# Patient Record
Sex: FEMALE | Race: WHITE | NOT HISPANIC OR LATINO | Employment: PART TIME | URBAN - METROPOLITAN AREA
[De-identification: names, ages, dates, MRNs, and addresses within clinical notes are randomized per-mention and may not be internally consistent; named-entity substitution may affect disease eponyms.]

---

## 2017-09-13 ENCOUNTER — TRANSCRIBE ORDERS (OUTPATIENT)
Dept: ADMINISTRATIVE | Facility: HOSPITAL | Age: 47
End: 2017-09-13

## 2017-09-13 DIAGNOSIS — Z12.31 VISIT FOR SCREENING MAMMOGRAM: Primary | ICD-10-CM

## 2017-09-27 ENCOUNTER — APPOINTMENT (OUTPATIENT)
Dept: MAMMOGRAPHY | Facility: HOSPITAL | Age: 47
End: 2017-09-27

## 2017-10-04 ENCOUNTER — HOSPITAL ENCOUNTER (OUTPATIENT)
Dept: MAMMOGRAPHY | Facility: HOSPITAL | Age: 47
Discharge: HOME | End: 2017-10-04
Admitting: OBSTETRICS & GYNECOLOGY

## 2017-10-04 DIAGNOSIS — Z12.31 VISIT FOR SCREENING MAMMOGRAM: ICD-10-CM

## 2017-10-04 PROCEDURE — 77063 BREAST TOMOSYNTHESIS BI: CPT

## 2017-10-04 PROCEDURE — G0202 SCR MAMMO BI INCL CAD: HCPCS

## 2017-10-07 PROCEDURE — 77063 BREAST TOMOSYNTHESIS BI: CPT | Performed by: RADIOLOGY

## 2017-10-07 PROCEDURE — 77067 SCR MAMMO BI INCL CAD: CPT | Performed by: RADIOLOGY

## 2017-10-11 ENCOUNTER — HOSPITAL ENCOUNTER (OUTPATIENT)
Dept: MAMMOGRAPHY | Facility: HOSPITAL | Age: 47
Discharge: HOME | End: 2017-10-11

## 2017-10-11 DIAGNOSIS — Z12.31 VISIT FOR SCREENING MAMMOGRAM: ICD-10-CM

## 2017-10-11 PROCEDURE — G0202 SCR MAMMO BI INCL CAD: HCPCS

## 2018-09-13 ENCOUNTER — TRANSCRIBE ORDERS (OUTPATIENT)
Dept: ADMINISTRATIVE | Facility: HOSPITAL | Age: 48
End: 2018-09-13

## 2018-09-13 DIAGNOSIS — Z12.31 VISIT FOR SCREENING MAMMOGRAM: Primary | ICD-10-CM

## 2018-10-18 ENCOUNTER — HOSPITAL ENCOUNTER (OUTPATIENT)
Dept: MAMMOGRAPHY | Facility: HOSPITAL | Age: 48
Discharge: HOME | End: 2018-10-18
Admitting: OBSTETRICS & GYNECOLOGY

## 2018-10-18 DIAGNOSIS — Z12.31 VISIT FOR SCREENING MAMMOGRAM: ICD-10-CM

## 2018-10-18 PROCEDURE — 77067 SCR MAMMO BI INCL CAD: CPT

## 2018-10-18 PROCEDURE — 77063 BREAST TOMOSYNTHESIS BI: CPT | Performed by: RADIOLOGY

## 2018-10-18 PROCEDURE — 77067 SCR MAMMO BI INCL CAD: CPT | Performed by: RADIOLOGY

## 2018-10-18 PROCEDURE — 77063 BREAST TOMOSYNTHESIS BI: CPT

## 2019-10-25 ENCOUNTER — APPOINTMENT (OUTPATIENT)
Dept: PREADMISSION TESTING | Facility: HOSPITAL | Age: 49
End: 2019-10-25

## 2019-10-25 VITALS — BODY MASS INDEX: 26.56 KG/M2 | WEIGHT: 149.91 LBS | HEIGHT: 63 IN

## 2019-10-25 LAB
ANION GAP SERPL CALCULATED.3IONS-SCNC: 9 MMOL/L (ref 5–15)
BUN BLD-MCNC: 10 MG/DL (ref 6–20)
BUN/CREAT SERPL: 14.3 (ref 7–25)
CALCIUM SPEC-SCNC: 9.2 MG/DL (ref 8.6–10.5)
CHLORIDE SERPL-SCNC: 106 MMOL/L (ref 98–107)
CO2 SERPL-SCNC: 27 MMOL/L (ref 22–29)
CREAT BLD-MCNC: 0.7 MG/DL (ref 0.57–1)
DEPRECATED RDW RBC AUTO: 40.9 FL (ref 37–54)
ERYTHROCYTE [DISTWIDTH] IN BLOOD BY AUTOMATED COUNT: 12 % (ref 12.3–15.4)
GFR SERPL CREATININE-BSD FRML MDRD: 89 ML/MIN/1.73
GLUCOSE BLD-MCNC: 72 MG/DL (ref 65–99)
HCT VFR BLD AUTO: 36.5 % (ref 34–46.6)
HGB BLD-MCNC: 11.9 G/DL (ref 12–15.9)
MCH RBC QN AUTO: 30.1 PG (ref 26.6–33)
MCHC RBC AUTO-ENTMCNC: 32.6 G/DL (ref 31.5–35.7)
MCV RBC AUTO: 92.2 FL (ref 79–97)
PLATELET # BLD AUTO: 309 10*3/MM3 (ref 140–450)
PMV BLD AUTO: 10 FL (ref 6–12)
POTASSIUM BLD-SCNC: 4.4 MMOL/L (ref 3.5–5.2)
RBC # BLD AUTO: 3.96 10*6/MM3 (ref 3.77–5.28)
SODIUM BLD-SCNC: 142 MMOL/L (ref 136–145)
WBC NRBC COR # BLD: 6.73 10*3/MM3 (ref 3.4–10.8)

## 2019-10-25 PROCEDURE — 80048 BASIC METABOLIC PNL TOTAL CA: CPT | Performed by: PLASTIC SURGERY

## 2019-10-25 PROCEDURE — 36415 COLL VENOUS BLD VENIPUNCTURE: CPT

## 2019-10-25 PROCEDURE — 85027 COMPLETE CBC AUTOMATED: CPT | Performed by: PLASTIC SURGERY

## 2019-10-25 RX ORDER — ZOLPIDEM TARTRATE 10 MG/1
10 TABLET ORAL NIGHTLY PRN
COMMUNITY

## 2019-10-25 RX ORDER — LEVOTHYROXINE SODIUM 0.1 MG/1
100 TABLET ORAL DAILY
COMMUNITY
End: 2022-11-23 | Stop reason: DRUGHIGH

## 2019-10-25 NOTE — PAT
The following information and instructions were given:    Do not eat, drink, smoke or chew gum after midnight the night before surgery. This also includes no mints.  Take all routine, prescribed medications including heart and blood pressure medicines with a sip of water unless otherwise instructed by your physician.   Do NOT take diabetic medication unless instructed by your physician.    If you were instructed to drink 20 ounces (or until full) of Gatorade the morning of surgery, the Gatorade must be completed 1 hour before arrival time on the day of surgery .  No RED Gatorade.      DO NOT shave for two days before your procedure.  Do not wear makeup.      DO NOT wear fingernail polish (gel/regular) and/or acrylic/artificial nails on the day of surgery.   If you have had a recent manicure and would rather not remove polish or artificial nails, then the minimum requirement is that the polish/artificial nails must be removed from the middle finger on each hand.      If you are having surgery/procedure on an upper extremity, then fingernail polish/artificial fingernails must be removed for surgery.  NO EXCEPTIONS.      If you are having surgery/procedure on a lower extremity, then toenail polish on both extremities must be removed for surgery.  NO EXCEPTIONS.    Remove all jewelry (advise to go to jeweler if unable to remove).  Jewelry, especially rings, can no longer be taped for surgery.    Leave anything you consider valuable at home.    Leave your suitcase in the car until after your surgery.    Bring the following with you the day of your procedure (when applicable)       -picture ID and insurance cards       -Co-pay/deductible required by insurance       -Medications in the original bottles (not a list) including all over-the-counter medications if not brought to PAT       -Copy of advance directive, living will or power of  documents if not brought to PAT       -CPAP or BIPAP mask and tubing (do not  bring machine)       -Skin prep instruction(s) sheet       -PAT Pass    Education booklet, brochure, handout or binder related to procedure given to patient.    When applicable, an ERAS booklet was given to patient.    Pain Control After Surgery handout given to patient.    Respirex use (handout given to patient) and pneumonia prevention education provided.    Signs and Symptoms of infection discussed.    DVT Prevention education given.  Stressing the importance of ambulation.    Please apply Chlorhexadine wipes to surgical area (if instructed) the night before procedure and the AM of procedure and document date/time of applications on skin prep instruction sheet.        Breast reduction

## 2019-10-25 NOTE — DISCHARGE INSTRUCTIONS
The following information and instructions were given:    Do not eat, drink, smoke or chew gum after midnight the night before surgery. This also includes no mints.  Take all routine, prescribed medications including heart and blood pressure medicines with a sip of water unless otherwise instructed by your physician.   Do NOT take diabetic medication unless instructed by your physician.    If you were instructed to drink 20 ounces (or until full) of Gatorade the morning of surgery, the Gatorade must be completed 1 hour before arrival time on the day of surgery .  No RED Gatorade.      DO NOT shave for two days before your procedure.  Do not wear makeup.      DO NOT wear fingernail polish (gel/regular) and/or acrylic/artificial nails on the day of surgery.   If you have had a recent manicure and would rather not remove polish or artificial nails, then the minimum requirement is that the polish/artificial nails must be removed from the middle finger on each hand.      If you are having surgery/procedure on an upper extremity, then fingernail polish/artificial fingernails must be removed for surgery.  NO EXCEPTIONS.      If you are having surgery/procedure on a lower extremity, then toenail polish on both extremities must be removed for surgery.  NO EXCEPTIONS.    Remove all jewelry (advise to go to jeweler if unable to remove).  Jewelry, especially rings, can no longer be taped for surgery.    Leave anything you consider valuable at home.    Leave your suitcase in the car until after your surgery.    Bring the following with you the day of your procedure (when applicable)       -picture ID and insurance cards       -Co-pay/deductible required by insurance       -Medications in the original bottles (not a list) including all over-the-counter medications if not brought to PAT       -Copy of advance directive, living will or power of  documents if not brought to PAT       -CPAP or BIPAP mask and tubing (do not  bring machine)       -Skin prep instruction(s) sheet       -PAT Pass    Education booklet, brochure, handout or binder related to procedure given to patient.    When applicable, an ERAS booklet was given to patient.    Pain Control After Surgery handout given to patient.    Respirex use (handout given to patient) and pneumonia prevention education provided.    Signs and Symptoms of infection discussed.    DVT Prevention education given.  Stressing the importance of ambulation.    Please apply Chlorhexadine wipes to surgical area (if instructed) the night before procedure and the AM of procedure and document date/time of applications on skin prep instruction sheet.

## 2019-10-31 ENCOUNTER — ANESTHESIA (OUTPATIENT)
Dept: PERIOP | Facility: HOSPITAL | Age: 49
End: 2019-10-31

## 2019-10-31 ENCOUNTER — HOSPITAL ENCOUNTER (OUTPATIENT)
Facility: HOSPITAL | Age: 49
Discharge: HOME | End: 2019-10-31
Attending: PLASTIC SURGERY | Admitting: PLASTIC SURGERY

## 2019-10-31 ENCOUNTER — ANESTHESIA EVENT (OUTPATIENT)
Dept: PERIOP | Facility: HOSPITAL | Age: 49
End: 2019-10-31

## 2019-10-31 VITALS
BODY MASS INDEX: 26.58 KG/M2 | WEIGHT: 150 LBS | TEMPERATURE: 97.8 F | HEIGHT: 63 IN | SYSTOLIC BLOOD PRESSURE: 118 MMHG | RESPIRATION RATE: 18 BRPM | DIASTOLIC BLOOD PRESSURE: 69 MMHG | HEART RATE: 65 BPM | OXYGEN SATURATION: 98 %

## 2019-10-31 DIAGNOSIS — N62 BREAST HYPERTROPHY: ICD-10-CM

## 2019-10-31 DIAGNOSIS — M54.6 PAIN IN THORACIC SPINE: ICD-10-CM

## 2019-10-31 LAB
B-HCG UR QL: NEGATIVE
INTERNAL NEGATIVE CONTROL: NEGATIVE
INTERNAL POSITIVE CONTROL: POSITIVE
Lab: NORMAL

## 2019-10-31 PROCEDURE — 25010000002 DEXAMETHASONE PER 1 MG: Performed by: NURSE ANESTHETIST, CERTIFIED REGISTERED

## 2019-10-31 PROCEDURE — 25010000002 DEXAMETHASONE SODIUM PHOSPHATE 10 MG/ML SOLUTION: Performed by: ANESTHESIOLOGY

## 2019-10-31 PROCEDURE — 25010000002 BUPRENORPHINE PER 0.1 MG: Performed by: ANESTHESIOLOGY

## 2019-10-31 PROCEDURE — 25010000002 FENTANYL CITRATE (PF) 100 MCG/2ML SOLUTION: Performed by: NURSE ANESTHETIST, CERTIFIED REGISTERED

## 2019-10-31 PROCEDURE — 81025 URINE PREGNANCY TEST: CPT | Performed by: PLASTIC SURGERY

## 2019-10-31 PROCEDURE — 25010000002 PROPOFOL 10 MG/ML EMULSION: Performed by: NURSE ANESTHETIST, CERTIFIED REGISTERED

## 2019-10-31 PROCEDURE — 25010000002 ONDANSETRON PER 1 MG: Performed by: NURSE ANESTHETIST, CERTIFIED REGISTERED

## 2019-10-31 PROCEDURE — 25010000003 CEFAZOLIN IN DEXTROSE 2-4 GM/100ML-% SOLUTION: Performed by: PLASTIC SURGERY

## 2019-10-31 PROCEDURE — 25010000002 NEOSTIGMINE 10 MG/10ML SOLUTION: Performed by: NURSE ANESTHETIST, CERTIFIED REGISTERED

## 2019-10-31 PROCEDURE — 25010000002 PROMETHAZINE PER 50 MG: Performed by: NURSE ANESTHETIST, CERTIFIED REGISTERED

## 2019-10-31 PROCEDURE — 88305 TISSUE EXAM BY PATHOLOGIST: CPT | Performed by: PLASTIC SURGERY

## 2019-10-31 RX ORDER — PROMETHAZINE HYDROCHLORIDE 25 MG/1
25 SUPPOSITORY RECTAL ONCE AS NEEDED
Status: DISCONTINUED | OUTPATIENT
Start: 2019-10-31 | End: 2019-10-31 | Stop reason: HOSPADM

## 2019-10-31 RX ORDER — SODIUM CHLORIDE, SODIUM LACTATE, POTASSIUM CHLORIDE, CALCIUM CHLORIDE 600; 310; 30; 20 MG/100ML; MG/100ML; MG/100ML; MG/100ML
9 INJECTION, SOLUTION INTRAVENOUS CONTINUOUS
Status: DISCONTINUED | OUTPATIENT
Start: 2019-10-31 | End: 2019-10-31

## 2019-10-31 RX ORDER — PROMETHAZINE HYDROCHLORIDE 25 MG/ML
6.25 INJECTION, SOLUTION INTRAMUSCULAR; INTRAVENOUS ONCE AS NEEDED
Status: DISCONTINUED | OUTPATIENT
Start: 2019-10-31 | End: 2019-10-31 | Stop reason: HOSPADM

## 2019-10-31 RX ORDER — SODIUM CHLORIDE, SODIUM LACTATE, POTASSIUM CHLORIDE, CALCIUM CHLORIDE 600; 310; 30; 20 MG/100ML; MG/100ML; MG/100ML; MG/100ML
INJECTION, SOLUTION INTRAVENOUS CONTINUOUS PRN
Status: DISCONTINUED | OUTPATIENT
Start: 2019-10-31 | End: 2019-10-31 | Stop reason: SURG

## 2019-10-31 RX ORDER — PROPOFOL 10 MG/ML
VIAL (ML) INTRAVENOUS AS NEEDED
Status: DISCONTINUED | OUTPATIENT
Start: 2019-10-31 | End: 2019-10-31 | Stop reason: SURG

## 2019-10-31 RX ORDER — DEXAMETHASONE SODIUM PHOSPHATE 4 MG/ML
INJECTION, SOLUTION INTRA-ARTICULAR; INTRALESIONAL; INTRAMUSCULAR; INTRAVENOUS; SOFT TISSUE AS NEEDED
Status: DISCONTINUED | OUTPATIENT
Start: 2019-10-31 | End: 2019-10-31 | Stop reason: SURG

## 2019-10-31 RX ORDER — HYDROCODONE BITARTRATE AND ACETAMINOPHEN 5; 325 MG/1; MG/1
1 TABLET ORAL ONCE AS NEEDED
Status: DISCONTINUED | OUTPATIENT
Start: 2019-10-31 | End: 2019-10-31 | Stop reason: HOSPADM

## 2019-10-31 RX ORDER — BUPRENORPHINE HYDROCHLORIDE 0.32 MG/ML
INJECTION INTRAMUSCULAR; INTRAVENOUS
Status: COMPLETED | OUTPATIENT
Start: 2019-10-31 | End: 2019-10-31

## 2019-10-31 RX ORDER — IPRATROPIUM BROMIDE AND ALBUTEROL SULFATE 2.5; .5 MG/3ML; MG/3ML
3 SOLUTION RESPIRATORY (INHALATION) ONCE AS NEEDED
Status: DISCONTINUED | OUTPATIENT
Start: 2019-10-31 | End: 2019-10-31 | Stop reason: HOSPADM

## 2019-10-31 RX ORDER — PROMETHAZINE HYDROCHLORIDE 25 MG/1
25 TABLET ORAL ONCE AS NEEDED
Status: DISCONTINUED | OUTPATIENT
Start: 2019-10-31 | End: 2019-10-31 | Stop reason: HOSPADM

## 2019-10-31 RX ORDER — NEOSTIGMINE METHYLSULFATE 1 MG/ML
INJECTION, SOLUTION INTRAVENOUS AS NEEDED
Status: DISCONTINUED | OUTPATIENT
Start: 2019-10-31 | End: 2019-10-31 | Stop reason: SURG

## 2019-10-31 RX ORDER — SODIUM CHLORIDE 0.9 % (FLUSH) 0.9 %
3-10 SYRINGE (ML) INJECTION AS NEEDED
Status: DISCONTINUED | OUTPATIENT
Start: 2019-10-31 | End: 2019-10-31

## 2019-10-31 RX ORDER — BUPIVACAINE HYDROCHLORIDE 2.5 MG/ML
INJECTION, SOLUTION EPIDURAL; INFILTRATION; INTRACAUDAL
Status: COMPLETED | OUTPATIENT
Start: 2019-10-31 | End: 2019-10-31

## 2019-10-31 RX ORDER — GLYCOPYRROLATE 0.2 MG/ML
INJECTION INTRAMUSCULAR; INTRAVENOUS AS NEEDED
Status: DISCONTINUED | OUTPATIENT
Start: 2019-10-31 | End: 2019-10-31 | Stop reason: SURG

## 2019-10-31 RX ORDER — SODIUM CHLORIDE 0.9 % (FLUSH) 0.9 %
3-10 SYRINGE (ML) INJECTION AS NEEDED
Status: DISCONTINUED | OUTPATIENT
Start: 2019-10-31 | End: 2019-10-31 | Stop reason: HOSPADM

## 2019-10-31 RX ORDER — SODIUM CHLORIDE 0.9 % (FLUSH) 0.9 %
3 SYRINGE (ML) INJECTION EVERY 12 HOURS SCHEDULED
Status: DISCONTINUED | OUTPATIENT
Start: 2019-10-31 | End: 2019-10-31

## 2019-10-31 RX ORDER — LIDOCAINE HYDROCHLORIDE 10 MG/ML
0.5 INJECTION, SOLUTION EPIDURAL; INFILTRATION; INTRACAUDAL; PERINEURAL ONCE AS NEEDED
Status: DISCONTINUED | OUTPATIENT
Start: 2019-10-31 | End: 2019-10-31

## 2019-10-31 RX ORDER — HYDROMORPHONE HYDROCHLORIDE 1 MG/ML
0.5 INJECTION, SOLUTION INTRAMUSCULAR; INTRAVENOUS; SUBCUTANEOUS
Status: DISCONTINUED | OUTPATIENT
Start: 2019-10-31 | End: 2019-10-31 | Stop reason: HOSPADM

## 2019-10-31 RX ORDER — ONDANSETRON 2 MG/ML
4 INJECTION INTRAMUSCULAR; INTRAVENOUS ONCE AS NEEDED
Status: DISCONTINUED | OUTPATIENT
Start: 2019-10-31 | End: 2019-10-31 | Stop reason: HOSPADM

## 2019-10-31 RX ORDER — SODIUM CHLORIDE 0.9 % (FLUSH) 0.9 %
3 SYRINGE (ML) INJECTION EVERY 12 HOURS SCHEDULED
Status: DISCONTINUED | OUTPATIENT
Start: 2019-10-31 | End: 2019-10-31 | Stop reason: HOSPADM

## 2019-10-31 RX ORDER — SODIUM CHLORIDE, SODIUM LACTATE, POTASSIUM CHLORIDE, CALCIUM CHLORIDE 600; 310; 30; 20 MG/100ML; MG/100ML; MG/100ML; MG/100ML
9 INJECTION, SOLUTION INTRAVENOUS CONTINUOUS PRN
Status: DISCONTINUED | OUTPATIENT
Start: 2019-10-31 | End: 2019-10-31 | Stop reason: HOSPADM

## 2019-10-31 RX ORDER — EPHEDRINE SULFATE 50 MG/ML
INJECTION, SOLUTION INTRAVENOUS AS NEEDED
Status: DISCONTINUED | OUTPATIENT
Start: 2019-10-31 | End: 2019-10-31 | Stop reason: SURG

## 2019-10-31 RX ORDER — ROCURONIUM BROMIDE 10 MG/ML
INJECTION, SOLUTION INTRAVENOUS AS NEEDED
Status: DISCONTINUED | OUTPATIENT
Start: 2019-10-31 | End: 2019-10-31 | Stop reason: SURG

## 2019-10-31 RX ORDER — MAGNESIUM HYDROXIDE 1200 MG/15ML
LIQUID ORAL AS NEEDED
Status: DISCONTINUED | OUTPATIENT
Start: 2019-10-31 | End: 2019-10-31 | Stop reason: HOSPADM

## 2019-10-31 RX ORDER — FAMOTIDINE 20 MG/1
20 TABLET, FILM COATED ORAL ONCE
Status: COMPLETED | OUTPATIENT
Start: 2019-10-31 | End: 2019-10-31

## 2019-10-31 RX ORDER — DEXAMETHASONE SODIUM PHOSPHATE 10 MG/ML
INJECTION, SOLUTION INTRAMUSCULAR; INTRAVENOUS
Status: COMPLETED | OUTPATIENT
Start: 2019-10-31 | End: 2019-10-31

## 2019-10-31 RX ORDER — FAMOTIDINE 10 MG/ML
20 INJECTION, SOLUTION INTRAVENOUS ONCE
Status: DISCONTINUED | OUTPATIENT
Start: 2019-10-31 | End: 2019-10-31

## 2019-10-31 RX ORDER — LABETALOL HYDROCHLORIDE 5 MG/ML
5 INJECTION, SOLUTION INTRAVENOUS
Status: DISCONTINUED | OUTPATIENT
Start: 2019-10-31 | End: 2019-10-31 | Stop reason: HOSPADM

## 2019-10-31 RX ORDER — FENTANYL CITRATE 50 UG/ML
INJECTION, SOLUTION INTRAMUSCULAR; INTRAVENOUS AS NEEDED
Status: DISCONTINUED | OUTPATIENT
Start: 2019-10-31 | End: 2019-10-31 | Stop reason: SURG

## 2019-10-31 RX ORDER — MEPERIDINE HYDROCHLORIDE 25 MG/ML
12.5 INJECTION INTRAMUSCULAR; INTRAVENOUS; SUBCUTANEOUS
Status: DISCONTINUED | OUTPATIENT
Start: 2019-10-31 | End: 2019-10-31 | Stop reason: HOSPADM

## 2019-10-31 RX ORDER — PROMETHAZINE HYDROCHLORIDE 25 MG/ML
INJECTION, SOLUTION INTRAMUSCULAR; INTRAVENOUS AS NEEDED
Status: DISCONTINUED | OUTPATIENT
Start: 2019-10-31 | End: 2019-10-31 | Stop reason: SURG

## 2019-10-31 RX ORDER — FAMOTIDINE 20 MG/1
20 TABLET, FILM COATED ORAL
Status: DISCONTINUED | OUTPATIENT
Start: 2019-10-31 | End: 2019-10-31 | Stop reason: SDUPTHER

## 2019-10-31 RX ORDER — FENTANYL CITRATE 50 UG/ML
50 INJECTION, SOLUTION INTRAMUSCULAR; INTRAVENOUS
Status: DISCONTINUED | OUTPATIENT
Start: 2019-10-31 | End: 2019-10-31 | Stop reason: HOSPADM

## 2019-10-31 RX ORDER — HYDRALAZINE HYDROCHLORIDE 20 MG/ML
5 INJECTION INTRAMUSCULAR; INTRAVENOUS
Status: DISCONTINUED | OUTPATIENT
Start: 2019-10-31 | End: 2019-10-31 | Stop reason: HOSPADM

## 2019-10-31 RX ORDER — LIDOCAINE HYDROCHLORIDE 10 MG/ML
0.5 INJECTION, SOLUTION EPIDURAL; INFILTRATION; INTRACAUDAL; PERINEURAL ONCE AS NEEDED
Status: COMPLETED | OUTPATIENT
Start: 2019-10-31 | End: 2019-10-31

## 2019-10-31 RX ORDER — ONDANSETRON 2 MG/ML
INJECTION INTRAMUSCULAR; INTRAVENOUS AS NEEDED
Status: DISCONTINUED | OUTPATIENT
Start: 2019-10-31 | End: 2019-10-31 | Stop reason: SURG

## 2019-10-31 RX ORDER — CEFAZOLIN SODIUM 2 G/100ML
2 INJECTION, SOLUTION INTRAVENOUS ONCE
Status: COMPLETED | OUTPATIENT
Start: 2019-10-31 | End: 2019-10-31

## 2019-10-31 RX ORDER — NALOXONE HCL 0.4 MG/ML
0.4 VIAL (ML) INJECTION AS NEEDED
Status: DISCONTINUED | OUTPATIENT
Start: 2019-10-31 | End: 2019-10-31 | Stop reason: HOSPADM

## 2019-10-31 RX ADMIN — EPHEDRINE SULFATE 10 MG: 50 INJECTION INTRAVENOUS at 09:01

## 2019-10-31 RX ADMIN — DEXAMETHASONE SODIUM PHOSPHATE 8 MG: 4 INJECTION, SOLUTION INTRAMUSCULAR; INTRAVENOUS at 07:55

## 2019-10-31 RX ADMIN — DEXAMETHASONE SODIUM PHOSPHATE 4 MG: 10 INJECTION INTRAMUSCULAR; INTRAVENOUS at 08:17

## 2019-10-31 RX ADMIN — FAMOTIDINE 20 MG: 20 TABLET ORAL at 06:50

## 2019-10-31 RX ADMIN — PROPOFOL 150 MG: 10 INJECTION, EMULSION INTRAVENOUS at 07:41

## 2019-10-31 RX ADMIN — NEOSTIGMINE METHYLSULFATE 3 MG: 1 INJECTION, SOLUTION INTRAVENOUS at 11:30

## 2019-10-31 RX ADMIN — BUPRENORPHINE HYDROCHLORIDE 0.3 MG: 0.32 INJECTION INTRAMUSCULAR; INTRAVENOUS at 08:17

## 2019-10-31 RX ADMIN — SODIUM CHLORIDE, POTASSIUM CHLORIDE, SODIUM LACTATE AND CALCIUM CHLORIDE: 600; 310; 30; 20 INJECTION, SOLUTION INTRAVENOUS at 08:43

## 2019-10-31 RX ADMIN — SODIUM CHLORIDE, POTASSIUM CHLORIDE, SODIUM LACTATE AND CALCIUM CHLORIDE 9 ML/HR: 600; 310; 30; 20 INJECTION, SOLUTION INTRAVENOUS at 06:50

## 2019-10-31 RX ADMIN — LIDOCAINE HYDROCHLORIDE 0.5 ML: 10 INJECTION, SOLUTION EPIDURAL; INFILTRATION; INTRACAUDAL; PERINEURAL at 06:50

## 2019-10-31 RX ADMIN — CEFAZOLIN SODIUM 2 G: 2 INJECTION, SOLUTION INTRAVENOUS at 07:45

## 2019-10-31 RX ADMIN — GLYCOPYRROLATE 0.4 MG: 0.2 INJECTION, SOLUTION INTRAMUSCULAR; INTRAVENOUS at 11:30

## 2019-10-31 RX ADMIN — PROMETHAZINE HYDROCHLORIDE 6.25 MG: 25 INJECTION INTRAMUSCULAR; INTRAVENOUS at 07:55

## 2019-10-31 RX ADMIN — EPHEDRINE SULFATE 10 MG: 50 INJECTION INTRAVENOUS at 09:22

## 2019-10-31 RX ADMIN — FENTANYL CITRATE 50 MCG: 50 INJECTION, SOLUTION INTRAMUSCULAR; INTRAVENOUS at 07:41

## 2019-10-31 RX ADMIN — SODIUM CHLORIDE, POTASSIUM CHLORIDE, SODIUM LACTATE AND CALCIUM CHLORIDE: 600; 310; 30; 20 INJECTION, SOLUTION INTRAVENOUS at 07:30

## 2019-10-31 RX ADMIN — PROPOFOL 25 MCG/KG/MIN: 10 INJECTION, EMULSION INTRAVENOUS at 07:55

## 2019-10-31 RX ADMIN — FENTANYL CITRATE 50 MCG: 50 INJECTION, SOLUTION INTRAMUSCULAR; INTRAVENOUS at 11:30

## 2019-10-31 RX ADMIN — ONDANSETRON 4 MG: 2 INJECTION INTRAMUSCULAR; INTRAVENOUS at 11:18

## 2019-10-31 RX ADMIN — BUPIVACAINE HYDROCHLORIDE 50 ML: 2.5 INJECTION, SOLUTION EPIDURAL; INFILTRATION; INTRACAUDAL; PERINEURAL at 08:17

## 2019-10-31 RX ADMIN — ROCURONIUM BROMIDE 50 MG: 10 INJECTION INTRAVENOUS at 07:42

## 2019-11-02 LAB
CYTO UR: NORMAL
LAB AP CASE REPORT: NORMAL
LAB AP CLINICAL INFORMATION: NORMAL
PATH REPORT.FINAL DX SPEC: NORMAL
PATH REPORT.GROSS SPEC: NORMAL

## 2020-04-27 DIAGNOSIS — H66.92 OTITIS OF LEFT EAR: Primary | ICD-10-CM

## 2020-04-27 RX ORDER — AMOXICILLIN 500 MG/1
1000 CAPSULE ORAL 2 TIMES DAILY
Qty: 14 CAPSULE | Refills: 0 | Status: SHIPPED | OUTPATIENT
Start: 2020-04-27 | End: 2022-11-23

## 2020-06-10 ENCOUNTER — TRANSCRIBE ORDERS (OUTPATIENT)
Dept: ADMINISTRATIVE | Facility: HOSPITAL | Age: 50
End: 2020-06-10

## 2020-06-10 DIAGNOSIS — Z12.31 VISIT FOR SCREENING MAMMOGRAM: Primary | ICD-10-CM

## 2020-08-07 ENCOUNTER — HOSPITAL ENCOUNTER (OUTPATIENT)
Dept: MAMMOGRAPHY | Facility: HOSPITAL | Age: 50
Discharge: HOME | End: 2020-08-07
Admitting: OBSTETRICS & GYNECOLOGY

## 2020-08-07 DIAGNOSIS — Z12.31 VISIT FOR SCREENING MAMMOGRAM: ICD-10-CM

## 2020-08-07 PROCEDURE — 77063 BREAST TOMOSYNTHESIS BI: CPT

## 2020-08-07 PROCEDURE — 77067 SCR MAMMO BI INCL CAD: CPT

## 2020-08-07 PROCEDURE — 77067 SCR MAMMO BI INCL CAD: CPT | Performed by: RADIOLOGY

## 2020-08-07 PROCEDURE — 77063 BREAST TOMOSYNTHESIS BI: CPT | Performed by: RADIOLOGY

## 2020-12-21 ENCOUNTER — IMMUNIZATION (OUTPATIENT)
Dept: VACCINE CLINIC | Facility: HOSPITAL | Age: 50
End: 2020-12-21

## 2020-12-21 PROCEDURE — 91300 HC SARSCOV02 VAC 30MCG/0.3ML IM: CPT

## 2020-12-21 PROCEDURE — 0001A: CPT

## 2021-01-11 ENCOUNTER — IMMUNIZATION (OUTPATIENT)
Dept: VACCINE CLINIC | Facility: HOSPITAL | Age: 51
End: 2021-01-11

## 2021-01-11 PROCEDURE — 91300 HC SARSCOV02 VAC 30MCG/0.3ML IM: CPT | Performed by: INTERNAL MEDICINE

## 2021-01-11 PROCEDURE — 0002A: CPT | Performed by: INTERNAL MEDICINE

## 2021-01-11 PROCEDURE — 0001A: CPT | Performed by: INTERNAL MEDICINE

## 2021-10-06 ENCOUNTER — IMMUNIZATION (OUTPATIENT)
Dept: VACCINE CLINIC | Facility: HOSPITAL | Age: 51
End: 2021-10-06

## 2021-10-06 PROCEDURE — 0003A: CPT | Performed by: INTERNAL MEDICINE

## 2021-10-06 PROCEDURE — 0004A ADM SARSCOV2 30MCG/0.3ML BOOSTER: CPT | Performed by: INTERNAL MEDICINE

## 2021-10-06 PROCEDURE — 91300 HC SARSCOV02 VAC 30MCG/0.3ML IM: CPT | Performed by: INTERNAL MEDICINE

## 2021-10-20 ENCOUNTER — TRANSCRIBE ORDERS (OUTPATIENT)
Dept: ADMINISTRATIVE | Facility: HOSPITAL | Age: 51
End: 2021-10-20

## 2021-10-20 DIAGNOSIS — Z12.31 VISIT FOR SCREENING MAMMOGRAM: Primary | ICD-10-CM

## 2021-11-23 ENCOUNTER — APPOINTMENT (OUTPATIENT)
Dept: MAMMOGRAPHY | Facility: HOSPITAL | Age: 51
End: 2021-11-23

## 2021-12-03 ENCOUNTER — OFFICE (OUTPATIENT)
Dept: URBAN - METROPOLITAN AREA PATHOLOGY 4 | Facility: PATHOLOGY | Age: 51
End: 2021-12-03
Payer: COMMERCIAL

## 2021-12-03 ENCOUNTER — AMBULATORY SURGICAL CENTER (OUTPATIENT)
Dept: URBAN - METROPOLITAN AREA SURGERY 10 | Facility: SURGERY | Age: 51
End: 2021-12-03
Payer: COMMERCIAL

## 2021-12-03 DIAGNOSIS — D12.8 BENIGN NEOPLASM OF RECTUM: ICD-10-CM

## 2021-12-03 DIAGNOSIS — Z12.11 ENCOUNTER FOR SCREENING FOR MALIGNANT NEOPLASM OF COLON: ICD-10-CM

## 2021-12-03 DIAGNOSIS — K64.0 FIRST DEGREE HEMORRHOIDS: ICD-10-CM

## 2021-12-03 PROCEDURE — 88305 TISSUE EXAM BY PATHOLOGIST: CPT | Mod: 33,Q6 | Performed by: INTERNAL MEDICINE

## 2021-12-03 PROCEDURE — 88305 TISSUE EXAM BY PATHOLOGIST: CPT | Mod: Q6,33 | Performed by: INTERNAL MEDICINE

## 2021-12-03 PROCEDURE — 45385 COLONOSCOPY W/LESION REMOVAL: CPT | Mod: 33 | Performed by: INTERNAL MEDICINE

## 2021-12-06 PROBLEM — Z12.11 SCREENING FOR COLONIC NEOPLASIA: Status: ACTIVE | Noted: 2021-12-06

## 2021-12-07 ENCOUNTER — HOSPITAL ENCOUNTER (OUTPATIENT)
Dept: MAMMOGRAPHY | Facility: HOSPITAL | Age: 51
Discharge: HOME | End: 2021-12-07
Admitting: OBSTETRICS & GYNECOLOGY

## 2021-12-07 DIAGNOSIS — Z12.31 VISIT FOR SCREENING MAMMOGRAM: ICD-10-CM

## 2021-12-07 PROCEDURE — 77063 BREAST TOMOSYNTHESIS BI: CPT

## 2021-12-07 PROCEDURE — 77063 BREAST TOMOSYNTHESIS BI: CPT | Performed by: RADIOLOGY

## 2021-12-07 PROCEDURE — 77067 SCR MAMMO BI INCL CAD: CPT

## 2021-12-07 PROCEDURE — 77067 SCR MAMMO BI INCL CAD: CPT | Performed by: RADIOLOGY

## 2022-04-18 DIAGNOSIS — Z00.6 EXAMINATION FOR NORMAL COMPARISON OR CONTROL IN CLINICAL RESEARCH: Primary | ICD-10-CM

## 2022-04-29 DIAGNOSIS — Z01.812 BLOOD TESTS PRIOR TO TREATMENT OR PROCEDURE: Primary | ICD-10-CM

## 2022-05-02 ENCOUNTER — CLINICAL SUPPORT NO REQUIREMENTS (OUTPATIENT)
Dept: PULMONOLOGY | Facility: CLINIC | Age: 52
End: 2022-05-02

## 2022-05-02 DIAGNOSIS — Z01.812 BLOOD TESTS PRIOR TO TREATMENT OR PROCEDURE: ICD-10-CM

## 2022-05-02 PROCEDURE — U0004 COV-19 TEST NON-CDC HGH THRU: HCPCS | Performed by: INTERNAL MEDICINE

## 2022-05-02 PROCEDURE — 99211 OFF/OP EST MAY X REQ PHY/QHP: CPT | Performed by: INTERNAL MEDICINE

## 2022-05-03 LAB — SARS-COV-2 RNA NOSE QL NAA+PROBE: NOT DETECTED

## 2022-05-04 ENCOUNTER — OFFICE VISIT (OUTPATIENT)
Dept: PULMONOLOGY | Facility: CLINIC | Age: 52
End: 2022-05-04

## 2022-05-04 VITALS
BODY MASS INDEX: 27.11 KG/M2 | WEIGHT: 153 LBS | DIASTOLIC BLOOD PRESSURE: 70 MMHG | HEART RATE: 68 BPM | HEIGHT: 63 IN | RESPIRATION RATE: 14 BRPM | SYSTOLIC BLOOD PRESSURE: 120 MMHG | OXYGEN SATURATION: 98 % | TEMPERATURE: 98.4 F

## 2022-05-04 DIAGNOSIS — R06.02 SHORTNESS OF BREATH: Primary | ICD-10-CM

## 2022-05-04 DIAGNOSIS — M94.0 COSTOCHONDRITIS, ACUTE: ICD-10-CM

## 2022-05-04 DIAGNOSIS — R05.9 COUGH: ICD-10-CM

## 2022-05-04 DIAGNOSIS — J20.9 ACUTE BRONCHITIS, UNSPECIFIED ORGANISM: ICD-10-CM

## 2022-05-04 PROCEDURE — 99203 OFFICE O/P NEW LOW 30 MIN: CPT | Performed by: INTERNAL MEDICINE

## 2022-05-04 PROCEDURE — 94726 PLETHYSMOGRAPHY LUNG VOLUMES: CPT | Performed by: INTERNAL MEDICINE

## 2022-05-04 PROCEDURE — 94010 BREATHING CAPACITY TEST: CPT | Performed by: INTERNAL MEDICINE

## 2022-05-04 PROCEDURE — 94729 DIFFUSING CAPACITY: CPT | Performed by: INTERNAL MEDICINE

## 2022-05-04 RX ORDER — ALBUTEROL SULFATE 90 UG/1
AEROSOL, METERED RESPIRATORY (INHALATION)
COMMUNITY
Start: 2022-04-06 | End: 2022-11-23

## 2022-05-04 NOTE — PROGRESS NOTES
"Sorts Subjective:     Chief Complaint:   Chief Complaint   Patient presents with   • Cough   • Shortness of Breath       HPI:    Gypsy Mann is a 52 y.o. female seen in consultation at the request of Ulises eTrry MD    She had issues with her back requiring steroid injections in January.  She also had a COVID infection in January.  She has had an increased cough since then.    She developed upper airway type \"cold\" symptoms in March.  She typically has these in January on a yearly basis.  She was not sure whether it was an infection, or allergies.  In any event over several weeks this descendent of her chest and she developed an increasingly bad cough.  This was so severe that she presented to Dr. Terry in early April.  She was prescribed prednisone and azithromycin.  This did not help and she returned on 4/13.  A CT scan of the chest was ordered which was normal.  A referral was made to see me at that time and she is now getting in for the evaluation.  Since the referral her cough has gradually improved.  It is still present but it is there at a low level.  She does have chest pain when she deep breathes or coughs.  She can reproduce this pain with pressure.    Typically sinus problems are not an issue for her and have not been a major issue with this symptom complex.  She was given albuterol which did not seem to help much.    Current medications are:   Current Outpatient Medications:   •  albuterol sulfate  (90 Base) MCG/ACT inhaler, INHALE 1 PUFF BY MOUTH 4 TIMES DAILY, Disp: , Rfl:   •  levothyroxine (SYNTHROID, LEVOTHROID) 100 MCG tablet, Take 100 mcg by mouth Daily., Disp: , Rfl:   •  zolpidem (AMBIEN) 10 MG tablet, Take 10 mg by mouth At Night As Needed for Sleep., Disp: , Rfl:   •  amoxicillin (AMOXIL) 500 MG capsule, Take 2 capsules by mouth 2 (Two) Times a Day., Disp: 14 capsule, Rfl: 0.      The patient's relevant past medical, surgical, family and social history were reviewed and updated " in Epic as appropriate.     ROS:    Review of Systems  ROS documented in patient questionnaire ×14 systems.  Reviewed with patient.  Otherwise negative except as noted in HPI.    Objective:    Physical Exam  Vitals and nursing note reviewed.   Constitutional:       Appearance: Normal appearance. She is well-developed.   HENT:      Head: Normocephalic and atraumatic.      Nose: Nose normal.      Mouth/Throat:      Mouth: Mucous membranes are moist.      Pharynx: Oropharynx is clear. No oropharyngeal exudate.   Eyes:      General: No scleral icterus.     Conjunctiva/sclera: Conjunctivae normal.   Neck:      Thyroid: No thyromegaly.      Trachea: No tracheal deviation.   Cardiovascular:      Rate and Rhythm: Normal rate and regular rhythm.      Heart sounds: No murmur heard.    No friction rub. No gallop.   Pulmonary:      Effort: Pulmonary effort is normal. No respiratory distress.      Breath sounds: No wheezing or rales.   Musculoskeletal:         General: No deformity. Normal range of motion.   Skin:     General: Skin is warm and dry.      Findings: No rash.   Neurological:      Mental Status: She is alert and oriented to person, place, and time.   Psychiatric:         Behavior: Behavior normal.         Thought Content: Thought content normal.         Data:     PFT: Borderline obstruction based only on the mid flows.  Normal lung volumes.  Normal diffusion.    CXR: None.  Recent negative CAT scan of the chest.  Those images were reviewed and I concur.    Assessment:    Problem List Items Addressed This Visit        Pulmonary Problems    Cough    Acute bronchitis    Relevant Medications    albuterol sulfate  (90 Base) MCG/ACT inhaler       Other    Costochondritis, acute      Other Visit Diagnoses     Shortness of breath    -  Primary    Relevant Orders    Pulmonary Function Test (Completed)          52-year-old female with episodes of yearly bronchitis now with persistent symptoms for 2 months presumably  related to an acute bronchitis.  She did have COVID in January and has been coughing more ever since then as well.  Her CAT scan is normal.  Her PFTs are probably normal except for some decrease in mid flows of unclear clinical significance.  She is improving from a cough standpoint.    I think she likely had an acute viral bronchitis and has had slowly resolving symptoms.  Her recent COVID infection may be playing a role in her airway inflammation.  There is no parenchymal issues based upon her CAT scan.    Plan:     1. I do not think there is anything else that I would recommend at the current time.  I do not think any regular inhaled therapy or preventative inhaled therapy would be appropriate  2. She says she gets bronchitis every January but, unfortunately, I do not think there is any way to prevent this other than the usual measures of vaccination and infection avoidance.  3. In regards to her costochondritis she can use a nonsteroidal on a regular basis for a week or 2 and with this and improvement in her cough I suspect this will resolve with time.  4. I do not think she needs longitudinal follow-up in the pulmonary clinic.  I will be happy to see her as she or Dr. Casey/Mara see fit.  5. Discussed in detail with the patient and showed her her PFTs and CAT scan    Level of Risk Low due to:  one stable chronic illnesss    Signed by  Oniel Parrish MD

## 2022-07-27 ENCOUNTER — TRANSCRIBE ORDERS (OUTPATIENT)
Dept: ADMINISTRATIVE | Facility: HOSPITAL | Age: 52
End: 2022-07-27

## 2022-07-27 DIAGNOSIS — M54.50 LOW BACK PAIN, UNSPECIFIED BACK PAIN LATERALITY, UNSPECIFIED CHRONICITY, UNSPECIFIED WHETHER SCIATICA PRESENT: Primary | ICD-10-CM

## 2022-11-23 ENCOUNTER — OFFICE VISIT (OUTPATIENT)
Dept: NEUROSURGERY | Facility: CLINIC | Age: 52
End: 2022-11-23

## 2022-11-23 VITALS — HEIGHT: 63 IN | WEIGHT: 164.8 LBS | TEMPERATURE: 98 F | BODY MASS INDEX: 29.2 KG/M2

## 2022-11-23 DIAGNOSIS — M51.36 DDD (DEGENERATIVE DISC DISEASE), LUMBAR: ICD-10-CM

## 2022-11-23 DIAGNOSIS — M54.16 LUMBAR RADICULOPATHY: Primary | ICD-10-CM

## 2022-11-23 PROCEDURE — 99204 OFFICE O/P NEW MOD 45 MIN: CPT | Performed by: NEUROLOGICAL SURGERY

## 2022-11-23 RX ORDER — GABAPENTIN 300 MG/1
CAPSULE ORAL
Qty: 90 CAPSULE | Refills: 0 | Status: SHIPPED | OUTPATIENT
Start: 2022-11-23 | End: 2023-01-25

## 2022-11-23 RX ORDER — LEVOTHYROXINE SODIUM 0.07 MG/1
75 TABLET ORAL DAILY
COMMUNITY
Start: 2022-09-07

## 2022-11-23 RX ORDER — MELOXICAM 15 MG/1
15 TABLET ORAL DAILY
COMMUNITY
Start: 2022-11-14 | End: 2023-02-22

## 2022-11-23 NOTE — PROGRESS NOTES
Patient: Gypsy Mann  : 1970    Primary Care Provider: Genaro Casey MD    Requesting Provider: As above        History    Chief Complaint: Low back and right lower extremity pain with sensory alteration.    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  In April of this year she developed some right buttock pain.  She denies any inciting or precipitating events.  In July she went on an RV trip and her pain substantially increased.  The pain is been in her buttock but she has had intermittent numbness extending into the S1 distribution of her right lower extremity.  She has had no left lower extremity symptoms.  She is worse with sitting.  She has seen Dr. Mayorga and has had 2 epidural injections.  The last one was done in September.  She was about 90% better.  Her pain is slowly recurring however.    Review of Systems   Constitutional: Negative for activity change, appetite change, chills, diaphoresis, fatigue, fever and unexpected weight change.   HENT: Negative for congestion, dental problem, drooling, ear discharge, ear pain, facial swelling, hearing loss, mouth sores, nosebleeds, postnasal drip, rhinorrhea, sinus pressure, sinus pain, sneezing, sore throat, tinnitus, trouble swallowing and voice change.    Eyes: Negative for photophobia, pain, discharge, redness, itching and visual disturbance.   Respiratory: Negative for apnea, cough, choking, chest tightness, shortness of breath, wheezing and stridor.    Cardiovascular: Negative for chest pain, palpitations and leg swelling.   Gastrointestinal: Negative for abdominal distention, abdominal pain, anal bleeding, blood in stool, constipation, diarrhea, nausea, rectal pain and vomiting.   Endocrine: Negative for cold intolerance, heat intolerance, polydipsia, polyphagia and polyuria.   Genitourinary: Negative for decreased urine volume, difficulty urinating, dysuria, enuresis, flank pain,  "frequency, genital sores, hematuria and urgency.   Musculoskeletal: Positive for back pain. Negative for arthralgias, gait problem, joint swelling, myalgias, neck pain and neck stiffness.   Skin: Negative for color change, pallor, rash and wound.   Allergic/Immunologic: Negative for environmental allergies, food allergies and immunocompromised state.   Neurological: Positive for weakness and numbness. Negative for dizziness, tremors, seizures, syncope, facial asymmetry, speech difficulty, light-headedness and headaches.   Hematological: Negative for adenopathy. Does not bruise/bleed easily.   Psychiatric/Behavioral: Negative for agitation, behavioral problems, confusion, decreased concentration, dysphoric mood, hallucinations, self-injury, sleep disturbance and suicidal ideas. The patient is not nervous/anxious and is not hyperactive.    All other systems reviewed and are negative.      The patient's past medical history, past surgical history, family history, and social history have been reviewed at length in the electronic medical record.      Physical Exam:   Temp 98 °F (36.7 °C) (Infrared)   Ht 160 cm (63\")   Wt 74.8 kg (164 lb 12.8 oz)   BMI 29.19 kg/m²   CONSTITUTIONAL: Patient is well-nourished, pleasant and appears stated age.  MUSCULOSKELETAL:  Straight leg raising is positive on the right at about 30 degrees.  Garth's Sign is negative.  ROM in the low back is normal.  Tenderness in the back to palpation is not observed.  NEUROLOGICAL:  Orientation, memory, attention span, language function, and cognition have been examined and are intact.  Strength is intact in the lower extremities to direct testing.  Muscle tone is normal throughout.  Station and gait are normal.  Sensation is intact to light touch testing throughout.  Deep tendon reflexes are 1+ and symmetrical.  Coordination is intact.      Medical Decision Making    Data Review:   (All imaging studies were personally reviewed unless stated " otherwise)  MRI of the lumbar spine dated 8/4/2022 demonstrates some subtle diffuse degenerative disc disease.  There is a generous right-sided disc extrusion at L5-S1 that effaces and compromises the right S1 nerve root.    Diagnosis:   Right S1 radiculopathy secondary to disc protrusion.    Treatment Options:   I have prescribed gabapentin in escalating doses.  The patient will follow-up with me in several weeks.  If difficulties persist then she may need to consider surgical intervention.  We will continue her anti-inflammatory medication as well.       Diagnosis Plan   1. Lumbar radiculopathy        2. DDD (degenerative disc disease), lumbar            Scribed for Yared Zapien MD by Madeline Marquis NEIL 11/23/2022 17:26 EST      I, Dr. Zapien, personally performed the services described in the documentation, as scribed in my presence, and it is both accurate and complete.

## 2022-12-06 ENCOUNTER — TRANSCRIBE ORDERS (OUTPATIENT)
Dept: ADMINISTRATIVE | Facility: HOSPITAL | Age: 52
End: 2022-12-06

## 2022-12-06 DIAGNOSIS — Z12.31 VISIT FOR SCREENING MAMMOGRAM: Primary | ICD-10-CM

## 2022-12-14 ENCOUNTER — OFFICE VISIT (OUTPATIENT)
Dept: NEUROSURGERY | Facility: CLINIC | Age: 52
End: 2022-12-14

## 2022-12-14 VITALS — TEMPERATURE: 98 F | WEIGHT: 164.2 LBS | BODY MASS INDEX: 29.09 KG/M2 | HEIGHT: 63 IN

## 2022-12-14 DIAGNOSIS — M54.16 LUMBAR RADICULOPATHY: Primary | ICD-10-CM

## 2022-12-14 DIAGNOSIS — M51.36 DDD (DEGENERATIVE DISC DISEASE), LUMBAR: ICD-10-CM

## 2022-12-14 PROCEDURE — 99213 OFFICE O/P EST LOW 20 MIN: CPT | Performed by: NEUROLOGICAL SURGERY

## 2022-12-14 NOTE — PROGRESS NOTES
Patient: Gypsy Mann  : 1970    Primary Care Provider: Genaro Casey MD    Requesting Provider: As above        History    Chief Complaint: Low back and right buttock pain with sensory alteration involving the leg.    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  In April of this year she developed some right buttock pain.  She denies any inciting or precipitating events.  In July she went on an RV trip and her pain substantially increased.  The pain is been in her buttock but she has had intermittent numbness extending into the S1 distribution of her right lower extremity.  She has had no left lower extremity symptoms.  She is worse with sitting.  She has seen Dr. Mayorga and has had 2 epidural injections.  The last one was done in September.  She was about 90% better.  She has tolerated the gabapentin that I prescribed at her last visit.  Unfortunately she still has trouble sitting in certain positions.  The buttock pain is the most significant.  Numbness in her leg intermittently occurs.  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    Review of Systems   Constitutional: Negative for activity change, appetite change, chills, diaphoresis, fatigue, fever and unexpected weight change.   HENT: Negative for congestion, dental problem, drooling, ear discharge, ear pain, facial swelling, hearing loss, mouth sores, nosebleeds, postnasal drip, rhinorrhea, sinus pressure, sinus pain, sneezing, sore throat, tinnitus, trouble swallowing and voice change.    Eyes: Negative for photophobia, pain, discharge, redness, itching and visual disturbance.   Respiratory: Negative for apnea, cough, choking, chest tightness, shortness of breath, wheezing and stridor.    Cardiovascular: Negative for chest pain, palpitations and leg swelling.   Gastrointestinal: Negative for abdominal distention, abdominal pain, anal bleeding,  "blood in stool, constipation, diarrhea, nausea, rectal pain and vomiting.   Endocrine: Negative for cold intolerance, heat intolerance, polydipsia, polyphagia and polyuria.   Genitourinary: Negative for decreased urine volume, difficulty urinating, dysuria, enuresis, flank pain, frequency, genital sores, hematuria and urgency.   Musculoskeletal: Positive for back pain (Right buttock pain). Negative for arthralgias, gait problem, joint swelling, myalgias, neck pain and neck stiffness.   Skin: Negative for color change, pallor, rash and wound.   Allergic/Immunologic: Negative for environmental allergies, food allergies and immunocompromised state.   Neurological: Negative for dizziness, tremors, seizures, syncope, facial asymmetry, speech difficulty, weakness, light-headedness, numbness and headaches.   Hematological: Negative for adenopathy. Does not bruise/bleed easily.   Psychiatric/Behavioral: Negative for agitation, behavioral problems, confusion, decreased concentration, dysphoric mood, hallucinations, self-injury, sleep disturbance and suicidal ideas. The patient is not nervous/anxious and is not hyperactive.    All other systems reviewed and are negative.      The patient's past medical history, past surgical history, family history, and social history have been reviewed at length in the electronic medical record.      Physical Exam:   Temp 98 °F (36.7 °C) (Infrared)   Ht 160 cm (62.99\")   Wt 74.5 kg (164 lb 3.2 oz)   BMI 29.09 kg/m²   Right leg raising remains quite positive on the right at about 30 degrees.    Medical Decision Making    Data Review:   (All imaging studies were personally reviewed unless stated otherwise)  MRI of the lumbar spine dated 8/4/2022 demonstrates some subtle diffuse degenerative disc disease.  There is a generous right-sided disc extrusion at L5-S1 that effaces and compromises the right S1 nerve root.       Diagnosis:   Right S1 radiculopathy secondary to disc " protrusion.    Treatment Options:   The patient is still having more symptoms then either of us would like.  I discussed treatment options.  Ultimately we have elected to increase her gabapentin and essentially double the dose over the next week or so.  She will follow-up in about 3 weeks.  If she continues to struggle then we will pursue right L5-S1 discectomy.  I have explained the nature of that procedure as well as the potential risks, complications, and limitations.       Diagnosis Plan   1. Lumbar radiculopathy        2. DDD (degenerative disc disease), lumbar            Scribed for Yared Zapien MD by RADHA Marlow 12/14/2022 08:30 EST      I, Dr. Zapien, personally performed the services described in the documentation, as scribed in my presence, and it is both accurate and complete.

## 2022-12-15 ENCOUNTER — TELEPHONE (OUTPATIENT)
Dept: NEUROSURGERY | Facility: CLINIC | Age: 52
End: 2022-12-15

## 2022-12-15 NOTE — TELEPHONE ENCOUNTER
Provider:  Curry  Surgery/Procedure:  L5-S1 discectomy  Surgery/Procedure Date:  Not scheduled yet  Last visit:   12/14/22  Next visit: 1/6/23     Reason for call: Patient called, had appointment yesterday with Dr. Zapien and discussed L5-S1 discectomy, at the time of appointment she wanted to hold off on surgery and increase gabapentin, however once she got home she had increased pain after sneezing and now has second thoughts, would like to go ahead and schedule surgery. Would like to have it before the end of the year if at all possible - has met her deductible.    She does work here at [facility] [employer] so if she needs to come in to the office to sign consent that will be no issue. Let us know what the next steps are for her.

## 2022-12-20 ENCOUNTER — TELEPHONE (OUTPATIENT)
Dept: NEUROSURGERY | Facility: CLINIC | Age: 52
End: 2022-12-20

## 2022-12-20 NOTE — TELEPHONE ENCOUNTER
Provider:  Curry  Surgery/Procedure:  MERLE  Surgery/Procedure Date:  NA  Last visit:   12/14/22  Next visit: 1/6/23     Reason for call: Patient called to inform us that she has increased stabbing nerve pain down her right lower back to her buttocks and leg. She was in her car adjusting her self in the seat and felt an excruciating pain, is now unable to even sit up comfortably and is only finding relief lying down. She has MRI of her lumbar spine results from 8/5/22, showing L4-5, L5-S1 right disc protrusion. Does take meloxicam daily.     Spoke with Arminda Cam PA-C and Rolando Collins PA-C, both agree for her to rest tonight, ice the lower back and to take 800mg ibuprofen and to call us for an update tomorrow. Reiterated to patient not to go to the emergency room as they are likely not able to provide the patient with much relief.

## 2022-12-21 ENCOUNTER — PREP FOR SURGERY (OUTPATIENT)
Dept: OTHER | Facility: HOSPITAL | Age: 52
End: 2022-12-21

## 2022-12-21 DIAGNOSIS — M51.16 LUMBAR DISC HERNIATION WITH RADICULOPATHY: Primary | ICD-10-CM

## 2022-12-21 RX ORDER — FAMOTIDINE 20 MG/1
20 TABLET, FILM COATED ORAL
Status: CANCELLED | OUTPATIENT
Start: 2022-12-21

## 2022-12-21 RX ORDER — CEFAZOLIN SODIUM 2 G/100ML
2 INJECTION, SOLUTION INTRAVENOUS ONCE
Status: CANCELLED | OUTPATIENT
Start: 2022-12-21 | End: 2022-12-21

## 2022-12-21 RX ORDER — CHLORHEXIDINE GLUCONATE 4 G/100ML
SOLUTION TOPICAL
Qty: 120 ML | Refills: 0 | Status: SHIPPED | OUTPATIENT
Start: 2022-12-21 | End: 2022-12-30 | Stop reason: HOSPADM

## 2022-12-21 NOTE — H&P
Patient: Gypsy Mann  : 1970     Primary Care Provider: Genaro Casey MD     Requesting Provider: As above           History     Chief Complaint: Low back and right buttock pain with sensory alteration involving the leg.     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  In April of this year she developed some right buttock pain.  She denies any inciting or precipitating events.  In July she went on an RV trip and her pain substantially increased.  The pain is been in her buttock but she has had intermittent numbness extending into the S1 distribution of her right lower extremity.  She has had no left lower extremity symptoms.  She is worse with sitting.  She has seen Dr. Mayorga and has had 2 epidural injections.  The last one was done in September.  She was about 90% better.  She has tolerated the gabapentin that I prescribed at her last visit.  Unfortunately she still has trouble sitting in certain positions.  The buttock pain is the most significant.  Numbness in her leg intermittently occurs.  Some days when she drives the work she has to get out alf and stretch for a while.     Review of Systems   Constitutional: Negative for activity change, appetite change, chills, diaphoresis, fatigue, fever and unexpected weight change.   HENT: Negative for congestion, dental problem, drooling, ear discharge, ear pain, facial swelling, hearing loss, mouth sores, nosebleeds, postnasal drip, rhinorrhea, sinus pressure, sinus pain, sneezing, sore throat, tinnitus, trouble swallowing and voice change.    Eyes: Negative for photophobia, pain, discharge, redness, itching and visual disturbance.   Respiratory: Negative for apnea, cough, choking, chest tightness, shortness of breath, wheezing and stridor.    Cardiovascular: Negative for chest pain, palpitations and leg swelling.   Gastrointestinal: Negative for abdominal distention, abdominal pain, anal  "bleeding, blood in stool, constipation, diarrhea, nausea, rectal pain and vomiting.   Endocrine: Negative for cold intolerance, heat intolerance, polydipsia, polyphagia and polyuria.   Genitourinary: Negative for decreased urine volume, difficulty urinating, dysuria, enuresis, flank pain, frequency, genital sores, hematuria and urgency.   Musculoskeletal: Positive for back pain (Right buttock pain). Negative for arthralgias, gait problem, joint swelling, myalgias, neck pain and neck stiffness.   Skin: Negative for color change, pallor, rash and wound.   Allergic/Immunologic: Negative for environmental allergies, food allergies and immunocompromised state.   Neurological: Negative for dizziness, tremors, seizures, syncope, facial asymmetry, speech difficulty, weakness, light-headedness, numbness and headaches.   Hematological: Negative for adenopathy. Does not bruise/bleed easily.   Psychiatric/Behavioral: Negative for agitation, behavioral problems, confusion, decreased concentration, dysphoric mood, hallucinations, self-injury, sleep disturbance and suicidal ideas. The patient is not nervous/anxious and is not hyperactive.    All other systems reviewed and are negative.        The patient's past medical history, past surgical history, family history, and social history have been reviewed at length in the electronic medical record.        Physical Exam:   Temp 98 °F (36.7 °C) (Infrared)   Ht 160 cm (62.99\")   Wt 74.5 kg (164 lb 3.2 oz)   BMI 29.09 kg/m²   Right leg raising remains quite positive on the right at about 30 degrees.     Medical Decision Making     Data Review:   (All imaging studies were personally reviewed unless stated otherwise)  MRI of the lumbar spine dated 8/4/2022 demonstrates some subtle diffuse degenerative disc disease.  There is a generous right-sided disc extrusion at L5-S1 that effaces and compromises the right S1 nerve root.        Diagnosis:   Right S1 radiculopathy secondary to disc " protrusion.     Treatment Options:   The patient continues to be symptomatic despite extensive conservative treatment.   At her last visit on 12/14/22, she and Dr. Zapien discussed treatment options. Her gabapentin was doubled, and the possibility of surgery was discussed. She called the office this week to report that the medication change was ineffective at controlling her pain and that she would like to proceed with the surgery. Dr Zapien has explained the nature of that procedure as well as the potential risks, complications, and limitations.          Diagnosis Plan   1. Lumbar radiculopathy          2. DDD (degenerative disc disease), lumbar             Mandy L EDIN Chen

## 2022-12-22 ENCOUNTER — TELEPHONE (OUTPATIENT)
Dept: NEUROSURGERY | Facility: CLINIC | Age: 52
End: 2022-12-22

## 2022-12-22 DIAGNOSIS — M54.16 LUMBAR RADICULOPATHY: ICD-10-CM

## 2022-12-22 RX ORDER — GABAPENTIN 600 MG/1
600 TABLET ORAL 3 TIMES DAILY
Qty: 90 TABLET | Refills: 0 | Status: SHIPPED | OUTPATIENT
Start: 2022-12-22 | End: 2022-12-30 | Stop reason: HOSPADM

## 2022-12-22 NOTE — TELEPHONE ENCOUNTER
Caller: Gypsy Mann    Relationship: Self    Best call back number: [phone]    What is the best time to reach you: ANY TIME    Who are you requesting to speak with (clinical staff, provider,  specific staff member): CLINICAL    What was the call regarding: FMLA PAPERWORK    Do you require a callback: YES     PATIENT WOULD LIKE TO KNOW IF THE OFFICE HAS RECEIVED FMLA PAPERWORK FAXED YESTERDAY BY [employer].  PLEASE CALL PATIENT WITH AN UPDATE.  THANK YOU!

## 2022-12-22 NOTE — TELEPHONE ENCOUNTER
Provider:  Curry  Surgery/Procedure:  Lumbar discectomy L5 S1 right  Surgery/Procedure Date:  Upcoming - 12/30/22  Last visit:   12/14/22  Next visit: 1/6/23     Reason for call: Refill request for gabapentin, last office note on 12/14 doubled her dose from 300mg TID to 600mg TID, however no new prescription was sent in, have pended updated prescription for 600mg gabapentin TID.    Surgery on 12/30/22 for L5-S1 disc herniation.    Jack:  10/11/2022 Zolpidem Tartrate 10MG 1970 90 90 Genaro Casey [facility]    [facility] 2  11/25/2022 Gabapentin 300MG 1970 90 33 Yared Zapien [facility]    [facility] 2

## 2022-12-28 ENCOUNTER — PRE-ADMISSION TESTING (OUTPATIENT)
Dept: PREADMISSION TESTING | Facility: HOSPITAL | Age: 52
End: 2022-12-28
Payer: COMMERCIAL

## 2022-12-28 VITALS — HEIGHT: 63 IN | BODY MASS INDEX: 29.75 KG/M2 | WEIGHT: 167.88 LBS

## 2022-12-28 DIAGNOSIS — M51.16 LUMBAR DISC HERNIATION WITH RADICULOPATHY: ICD-10-CM

## 2022-12-28 LAB
ANION GAP SERPL CALCULATED.3IONS-SCNC: 13 MMOL/L (ref 5–15)
BASOPHILS # BLD AUTO: 0.02 10*3/MM3 (ref 0–0.2)
BASOPHILS NFR BLD AUTO: 0.3 % (ref 0–1.5)
BUN SERPL-MCNC: 15 MG/DL (ref 6–20)
BUN/CREAT SERPL: 25.4 (ref 7–25)
CALCIUM SPEC-SCNC: 9.7 MG/DL (ref 8.6–10.5)
CHLORIDE SERPL-SCNC: 103 MMOL/L (ref 98–107)
CO2 SERPL-SCNC: 23 MMOL/L (ref 22–29)
CREAT SERPL-MCNC: 0.59 MG/DL (ref 0.57–1)
DEPRECATED RDW RBC AUTO: 42.3 FL (ref 37–54)
EGFRCR SERPLBLD CKD-EPI 2021: 107.9 ML/MIN/1.73
EOSINOPHIL # BLD AUTO: 0.2 10*3/MM3 (ref 0–0.4)
EOSINOPHIL NFR BLD AUTO: 3.2 % (ref 0.3–6.2)
ERYTHROCYTE [DISTWIDTH] IN BLOOD BY AUTOMATED COUNT: 12.2 % (ref 12.3–15.4)
GLUCOSE SERPL-MCNC: 94 MG/DL (ref 65–99)
HCT VFR BLD AUTO: 37 % (ref 34–46.6)
HGB BLD-MCNC: 11.6 G/DL (ref 12–15.9)
IMM GRANULOCYTES # BLD AUTO: 0.01 10*3/MM3 (ref 0–0.05)
IMM GRANULOCYTES NFR BLD AUTO: 0.2 % (ref 0–0.5)
LYMPHOCYTES # BLD AUTO: 2.32 10*3/MM3 (ref 0.7–3.1)
LYMPHOCYTES NFR BLD AUTO: 36.8 % (ref 19.6–45.3)
MCH RBC QN AUTO: 29.7 PG (ref 26.6–33)
MCHC RBC AUTO-ENTMCNC: 31.4 G/DL (ref 31.5–35.7)
MCV RBC AUTO: 94.9 FL (ref 79–97)
MONOCYTES # BLD AUTO: 0.36 10*3/MM3 (ref 0.1–0.9)
MONOCYTES NFR BLD AUTO: 5.7 % (ref 5–12)
MRSA DNA SPEC QL NAA+PROBE: NEGATIVE
NEUTROPHILS NFR BLD AUTO: 3.4 10*3/MM3 (ref 1.7–7)
NEUTROPHILS NFR BLD AUTO: 53.8 % (ref 42.7–76)
NRBC BLD AUTO-RTO: 0 /100 WBC (ref 0–0.2)
PLATELET # BLD AUTO: 331 10*3/MM3 (ref 140–450)
PMV BLD AUTO: 10 FL (ref 6–12)
POTASSIUM SERPL-SCNC: 4.8 MMOL/L (ref 3.5–5.2)
RBC # BLD AUTO: 3.9 10*6/MM3 (ref 3.77–5.28)
SODIUM SERPL-SCNC: 139 MMOL/L (ref 136–145)
WBC NRBC COR # BLD: 6.31 10*3/MM3 (ref 3.4–10.8)

## 2022-12-28 PROCEDURE — 80048 BASIC METABOLIC PNL TOTAL CA: CPT

## 2022-12-28 PROCEDURE — 36415 COLL VENOUS BLD VENIPUNCTURE: CPT

## 2022-12-28 PROCEDURE — 93010 ELECTROCARDIOGRAM REPORT: CPT | Performed by: INTERNAL MEDICINE

## 2022-12-28 PROCEDURE — 87641 MR-STAPH DNA AMP PROBE: CPT

## 2022-12-28 PROCEDURE — 93005 ELECTROCARDIOGRAM TRACING: CPT

## 2022-12-28 PROCEDURE — 85025 COMPLETE CBC W/AUTO DIFF WBC: CPT

## 2022-12-28 NOTE — PAT
An arrival time for procedure was not provided during PAT visit. If patient had any questions or concerns about their arrival time, they were instructed to contact their surgeon/physician.  Additionally, if the patient referred to an arrival time that was acquired from their my chart account, patient was encouraged to verify that time with their surgeon/physician. Arrival times are NOT provided in Pre Admission Testing Department.    Patient instructed to drink 20 ounces of Gatorade and it needs to be completed 1 hour (for Main OR patients) or 2 hours (scheduled  section & [facility] patients) before given arrival time for procedure (NO RED Gatorade)    Patient verbalized understanding.    Patient denies any current skin issues.     Patient viewed general PAT education video as instructed in their preoperative information received from their surgeon.  Patient stated the general PAT education video was viewed in its entirety and survey completed.  Copies of PAT general education handouts (Incentive Spirometry, Meds to Beds Program, Patient Belongings, Pre-op skin preparation instructions, Blood Glucose testing, Visitor policy, Surgery FAQ, Code H) distributed to patient if not printed. Education related to the PAT pass and skin preparation for surgery (if applicable) completed in PAT as a reinforcement to PAT education video. Patient instructed to return PAT pass provided today as well as completed skin preparation sheet (if applicable) on the day of procedure.     Additionally if patient had not viewed video yet but intended to view it at home or in our waiting area, then referred them to the handout with QR code/link provided during PAT visit.  Instructed patient to complete survey after viewing the video in its entirety.  Encouraged patient/family to read PAT general education handouts thoroughly and notify PAT staff with any questions or concerns. Patient verbalized understanding of all information and  priority content.    PATIENT STATES THAT HER PHARMACY ONLY HAD THE BACTROBAN ORDER FOR HER. PATIENT WAS GIVEN INSTRUCITONS ON HOW TO AND WHEN TO USE. PATIENT TO  BACTROBAN FROM HER PHARMACY. PATIENT VERBALIZED UNDERSTANDING.     Patient to apply Chlorhexadine wipes  to surgical area (as instructed) the night before procedure and the AM of procedure. Wipes provided.    PATIENT EKG ON CHART AND IN Epic FROM 12/28/2022

## 2022-12-29 LAB
QT INTERVAL: 368 MS
QTC INTERVAL: 388 MS

## 2022-12-30 ENCOUNTER — APPOINTMENT (OUTPATIENT)
Dept: GENERAL RADIOLOGY | Facility: HOSPITAL | Age: 52
End: 2022-12-30
Payer: COMMERCIAL

## 2022-12-30 ENCOUNTER — ANESTHESIA EVENT (OUTPATIENT)
Dept: PERIOP | Facility: HOSPITAL | Age: 52
End: 2022-12-30
Payer: COMMERCIAL

## 2022-12-30 ENCOUNTER — ANESTHESIA (OUTPATIENT)
Dept: PERIOP | Facility: HOSPITAL | Age: 52
End: 2022-12-30
Payer: COMMERCIAL

## 2022-12-30 ENCOUNTER — HOSPITAL ENCOUNTER (OUTPATIENT)
Facility: HOSPITAL | Age: 52
Setting detail: HOSPITAL OUTPATIENT SURGERY
Discharge: HOME | End: 2022-12-30
Attending: NEUROLOGICAL SURGERY | Admitting: NEUROLOGICAL SURGERY
Payer: COMMERCIAL

## 2022-12-30 VITALS
BODY MASS INDEX: 29.75 KG/M2 | SYSTOLIC BLOOD PRESSURE: 152 MMHG | HEIGHT: 63 IN | RESPIRATION RATE: 14 BRPM | DIASTOLIC BLOOD PRESSURE: 94 MMHG | HEART RATE: 112 BPM | WEIGHT: 167.88 LBS | OXYGEN SATURATION: 96 % | TEMPERATURE: 98 F

## 2022-12-30 DIAGNOSIS — M51.16 LUMBAR DISC HERNIATION WITH RADICULOPATHY: Primary | ICD-10-CM

## 2022-12-30 LAB
B-HCG UR QL: NEGATIVE
EXPIRATION DATE: NORMAL
INTERNAL NEGATIVE CONTROL: NEGATIVE
INTERNAL POSITIVE CONTROL: POSITIVE
Lab: NORMAL

## 2022-12-30 PROCEDURE — 25010000002 NEOSTIGMINE 10 MG/10ML SOLUTION: Performed by: NURSE ANESTHETIST, CERTIFIED REGISTERED

## 2022-12-30 PROCEDURE — 63030 LAMOT DCMPRN NRV RT 1 LMBR: CPT

## 2022-12-30 PROCEDURE — 25010000002 PROPOFOL 10 MG/ML EMULSION: Performed by: NURSE ANESTHETIST, CERTIFIED REGISTERED

## 2022-12-30 PROCEDURE — 25010000002 HYDROMORPHONE PER 4 MG: Performed by: NURSE ANESTHETIST, CERTIFIED REGISTERED

## 2022-12-30 PROCEDURE — 25010000002 HYDROMORPHONE 1 MG/ML SOLUTION

## 2022-12-30 PROCEDURE — 25010000002 CEFAZOLIN IN DEXTROSE 2-4 GM/100ML-% SOLUTION: Performed by: PHYSICIAN ASSISTANT

## 2022-12-30 PROCEDURE — 25010000002 FENTANYL CITRATE (PF) 100 MCG/2ML SOLUTION: Performed by: NURSE ANESTHETIST, CERTIFIED REGISTERED

## 2022-12-30 PROCEDURE — 81025 URINE PREGNANCY TEST: CPT | Performed by: NEUROLOGICAL SURGERY

## 2022-12-30 PROCEDURE — 25010000002 FENTANYL CITRATE (PF) 50 MCG/ML SOLUTION

## 2022-12-30 PROCEDURE — 25010000002 DEXAMETHASONE PER 1 MG: Performed by: NURSE ANESTHETIST, CERTIFIED REGISTERED

## 2022-12-30 PROCEDURE — 25010000002 ONDANSETRON PER 1 MG: Performed by: NURSE ANESTHETIST, CERTIFIED REGISTERED

## 2022-12-30 PROCEDURE — S0260 H&P FOR SURGERY: HCPCS

## 2022-12-30 PROCEDURE — 76000 FLUOROSCOPY <1 HR PHYS/QHP: CPT

## 2022-12-30 PROCEDURE — 63030 LAMOT DCMPRN NRV RT 1 LMBR: CPT | Performed by: NEUROLOGICAL SURGERY

## 2022-12-30 DEVICE — HEMOST ABS SURGIFOAM SZ100 8X12 10MM: Type: IMPLANTABLE DEVICE | Site: SPINE LUMBAR | Status: FUNCTIONAL

## 2022-12-30 DEVICE — FLOSEAL HEMOSTATIC MATRIX, 10ML
Type: IMPLANTABLE DEVICE | Site: SPINE LUMBAR | Status: FUNCTIONAL
Brand: FLOSEAL HEMOSTATIC MATRIX

## 2022-12-30 RX ORDER — ONDANSETRON 2 MG/ML
INJECTION INTRAMUSCULAR; INTRAVENOUS AS NEEDED
Status: DISCONTINUED | OUTPATIENT
Start: 2022-12-30 | End: 2022-12-30 | Stop reason: SURG

## 2022-12-30 RX ORDER — GLYCOPYRROLATE 0.2 MG/ML
INJECTION INTRAMUSCULAR; INTRAVENOUS AS NEEDED
Status: DISCONTINUED | OUTPATIENT
Start: 2022-12-30 | End: 2022-12-30 | Stop reason: SURG

## 2022-12-30 RX ORDER — MULTIPLE VITAMINS W/ MINERALS TAB 9MG-400MCG
1 TAB ORAL DAILY
COMMUNITY

## 2022-12-30 RX ORDER — LIDOCAINE HYDROCHLORIDE 10 MG/ML
0.5 INJECTION, SOLUTION EPIDURAL; INFILTRATION; INTRACAUDAL; PERINEURAL ONCE AS NEEDED
Status: COMPLETED | OUTPATIENT
Start: 2022-12-30 | End: 2022-12-30

## 2022-12-30 RX ORDER — OXYCODONE HYDROCHLORIDE AND ACETAMINOPHEN 5; 325 MG/1; MG/1
1 TABLET ORAL 3 TIMES DAILY PRN
Qty: 15 TABLET | Refills: 0 | Status: SHIPPED | OUTPATIENT
Start: 2022-12-30 | End: 2023-01-25

## 2022-12-30 RX ORDER — DEXAMETHASONE SODIUM PHOSPHATE 4 MG/ML
INJECTION, SOLUTION INTRA-ARTICULAR; INTRALESIONAL; INTRAMUSCULAR; INTRAVENOUS; SOFT TISSUE AS NEEDED
Status: DISCONTINUED | OUTPATIENT
Start: 2022-12-30 | End: 2022-12-30 | Stop reason: SURG

## 2022-12-30 RX ORDER — OXYCODONE HYDROCHLORIDE AND ACETAMINOPHEN 5; 325 MG/1; MG/1
TABLET ORAL
Status: COMPLETED
Start: 2022-12-30 | End: 2022-12-30

## 2022-12-30 RX ORDER — LIDOCAINE HYDROCHLORIDE 10 MG/ML
INJECTION, SOLUTION EPIDURAL; INFILTRATION; INTRACAUDAL; PERINEURAL AS NEEDED
Status: DISCONTINUED | OUTPATIENT
Start: 2022-12-30 | End: 2022-12-30 | Stop reason: SURG

## 2022-12-30 RX ORDER — MAGNESIUM HYDROXIDE 1200 MG/15ML
LIQUID ORAL AS NEEDED
Status: DISCONTINUED | OUTPATIENT
Start: 2022-12-30 | End: 2022-12-30 | Stop reason: HOSPADM

## 2022-12-30 RX ORDER — MIDAZOLAM HYDROCHLORIDE 1 MG/ML
1 INJECTION INTRAMUSCULAR; INTRAVENOUS
Status: DISCONTINUED | OUTPATIENT
Start: 2022-12-30 | End: 2022-12-30 | Stop reason: HOSPADM

## 2022-12-30 RX ORDER — PROPOFOL 10 MG/ML
VIAL (ML) INTRAVENOUS AS NEEDED
Status: DISCONTINUED | OUTPATIENT
Start: 2022-12-30 | End: 2022-12-30 | Stop reason: SURG

## 2022-12-30 RX ORDER — SODIUM CHLORIDE 9 MG/ML
40 INJECTION, SOLUTION INTRAVENOUS AS NEEDED
Status: DISCONTINUED | OUTPATIENT
Start: 2022-12-30 | End: 2022-12-30 | Stop reason: HOSPADM

## 2022-12-30 RX ORDER — FENTANYL CITRATE 50 UG/ML
INJECTION, SOLUTION INTRAMUSCULAR; INTRAVENOUS AS NEEDED
Status: DISCONTINUED | OUTPATIENT
Start: 2022-12-30 | End: 2022-12-30 | Stop reason: SURG

## 2022-12-30 RX ORDER — SODIUM CHLORIDE 0.9 % (FLUSH) 0.9 %
10 SYRINGE (ML) INJECTION AS NEEDED
Status: DISCONTINUED | OUTPATIENT
Start: 2022-12-30 | End: 2022-12-30 | Stop reason: HOSPADM

## 2022-12-30 RX ORDER — ROCURONIUM BROMIDE 10 MG/ML
INJECTION, SOLUTION INTRAVENOUS AS NEEDED
Status: DISCONTINUED | OUTPATIENT
Start: 2022-12-30 | End: 2022-12-30 | Stop reason: SURG

## 2022-12-30 RX ORDER — CEFAZOLIN SODIUM 2 G/100ML
2 INJECTION, SOLUTION INTRAVENOUS ONCE
Status: COMPLETED | OUTPATIENT
Start: 2022-12-30 | End: 2022-12-30

## 2022-12-30 RX ORDER — BUPIVACAINE HYDROCHLORIDE AND EPINEPHRINE 2.5; 5 MG/ML; UG/ML
INJECTION, SOLUTION EPIDURAL; INFILTRATION; INTRACAUDAL; PERINEURAL AS NEEDED
Status: DISCONTINUED | OUTPATIENT
Start: 2022-12-30 | End: 2022-12-30 | Stop reason: HOSPADM

## 2022-12-30 RX ORDER — NEOSTIGMINE METHYLSULFATE 1 MG/ML
INJECTION, SOLUTION INTRAVENOUS AS NEEDED
Status: DISCONTINUED | OUTPATIENT
Start: 2022-12-30 | End: 2022-12-30 | Stop reason: SURG

## 2022-12-30 RX ORDER — FENTANYL CITRATE 50 UG/ML
50 INJECTION, SOLUTION INTRAMUSCULAR; INTRAVENOUS
Status: DISCONTINUED | OUTPATIENT
Start: 2022-12-30 | End: 2022-12-30 | Stop reason: HOSPADM

## 2022-12-30 RX ORDER — SODIUM CHLORIDE 0.9 % (FLUSH) 0.9 %
10 SYRINGE (ML) INJECTION EVERY 12 HOURS SCHEDULED
Status: DISCONTINUED | OUTPATIENT
Start: 2022-12-30 | End: 2022-12-30 | Stop reason: HOSPADM

## 2022-12-30 RX ORDER — OXYCODONE HYDROCHLORIDE AND ACETAMINOPHEN 5; 325 MG/1; MG/1
1 TABLET ORAL ONCE AS NEEDED
Status: DISCONTINUED | OUTPATIENT
Start: 2022-12-30 | End: 2022-12-30 | Stop reason: HOSPADM

## 2022-12-30 RX ORDER — SODIUM CHLORIDE, SODIUM LACTATE, POTASSIUM CHLORIDE, CALCIUM CHLORIDE 600; 310; 30; 20 MG/100ML; MG/100ML; MG/100ML; MG/100ML
9 INJECTION, SOLUTION INTRAVENOUS CONTINUOUS PRN
Status: DISCONTINUED | OUTPATIENT
Start: 2022-12-30 | End: 2022-12-30 | Stop reason: HOSPADM

## 2022-12-30 RX ORDER — FAMOTIDINE 20 MG/1
20 TABLET, FILM COATED ORAL
Status: COMPLETED | OUTPATIENT
Start: 2022-12-30 | End: 2022-12-30

## 2022-12-30 RX ORDER — FAMOTIDINE 20 MG/1
20 TABLET, FILM COATED ORAL
Status: DISCONTINUED | OUTPATIENT
Start: 2022-12-30 | End: 2022-12-30 | Stop reason: SDUPTHER

## 2022-12-30 RX ORDER — HYDROMORPHONE HYDROCHLORIDE 1 MG/ML
0.5 INJECTION, SOLUTION INTRAMUSCULAR; INTRAVENOUS; SUBCUTANEOUS
Status: DISCONTINUED | OUTPATIENT
Start: 2022-12-30 | End: 2022-12-30 | Stop reason: HOSPADM

## 2022-12-30 RX ORDER — FENTANYL CITRATE 50 UG/ML
INJECTION, SOLUTION INTRAMUSCULAR; INTRAVENOUS
Status: COMPLETED
Start: 2022-12-30 | End: 2022-12-30

## 2022-12-30 RX ADMIN — FENTANYL CITRATE 50 MCG: 50 INJECTION, SOLUTION INTRAMUSCULAR; INTRAVENOUS at 08:32

## 2022-12-30 RX ADMIN — LIDOCAINE HYDROCHLORIDE 0.5 ML: 10 INJECTION, SOLUTION EPIDURAL; INFILTRATION; INTRACAUDAL; PERINEURAL at 06:19

## 2022-12-30 RX ADMIN — DEXAMETHASONE SODIUM PHOSPHATE 8 MG: 4 INJECTION, SOLUTION INTRAMUSCULAR; INTRAVENOUS at 07:07

## 2022-12-30 RX ADMIN — CEFAZOLIN SODIUM 2 G: 2 INJECTION, SOLUTION INTRAVENOUS at 07:04

## 2022-12-30 RX ADMIN — PROPOFOL 200 MG: 10 INJECTION, EMULSION INTRAVENOUS at 07:04

## 2022-12-30 RX ADMIN — FENTANYL CITRATE 100 MCG: 50 INJECTION, SOLUTION INTRAMUSCULAR; INTRAVENOUS at 07:04

## 2022-12-30 RX ADMIN — HYDROMORPHONE HYDROCHLORIDE 0.5 MG: 1 INJECTION, SOLUTION INTRAMUSCULAR; INTRAVENOUS; SUBCUTANEOUS at 08:16

## 2022-12-30 RX ADMIN — HYDROMORPHONE HYDROCHLORIDE 0.5 MG: 1 INJECTION, SOLUTION INTRAMUSCULAR; INTRAVENOUS; SUBCUTANEOUS at 08:43

## 2022-12-30 RX ADMIN — ROCURONIUM BROMIDE 50 MG: 10 INJECTION, SOLUTION INTRAVENOUS at 07:04

## 2022-12-30 RX ADMIN — ONDANSETRON 4 MG: 2 INJECTION INTRAMUSCULAR; INTRAVENOUS at 07:51

## 2022-12-30 RX ADMIN — FAMOTIDINE 20 MG: 20 TABLET ORAL at 06:19

## 2022-12-30 RX ADMIN — NEOSTIGMINE 2.5 MG: 1 INJECTION INTRAVENOUS at 07:51

## 2022-12-30 RX ADMIN — GLYCOPYRROLATE 0.4 MCG: 0.2 INJECTION INTRAMUSCULAR; INTRAVENOUS at 07:51

## 2022-12-30 RX ADMIN — OXYCODONE HYDROCHLORIDE AND ACETAMINOPHEN 1 TABLET: 5; 325 TABLET ORAL at 08:56

## 2022-12-30 RX ADMIN — SODIUM CHLORIDE, POTASSIUM CHLORIDE, SODIUM LACTATE AND CALCIUM CHLORIDE 9 ML/HR: 600; 310; 30; 20 INJECTION, SOLUTION INTRAVENOUS at 06:17

## 2022-12-30 RX ADMIN — HYDROMORPHONE HYDROCHLORIDE 0.5 MG: 1 INJECTION, SOLUTION INTRAMUSCULAR; INTRAVENOUS; SUBCUTANEOUS at 08:10

## 2022-12-30 RX ADMIN — LIDOCAINE HYDROCHLORIDE 50 MG: 10 INJECTION, SOLUTION EPIDURAL; INFILTRATION; INTRACAUDAL; PERINEURAL at 07:04

## 2022-12-30 NOTE — ANESTHESIA PREPROCEDURE EVALUATION
Anesthesia Evaluation     Patient summary reviewed and Nursing notes reviewed   no history of anesthetic complications:  NPO Solid Status: > 8 hours  NPO Liquid Status: > 2 hours           Airway   Mallampati: I  TM distance: >3 FB  Neck ROM: full  No difficulty expected  Dental - normal exam     Pulmonary     breath sounds clear to auscultation  Cardiovascular     Rhythm: regular  Rate: normal        Neuro/Psych  (+) numbness,    GI/Hepatic/Renal/Endo    (+)   thyroid problem hypothyroidism    Musculoskeletal     (+) back pain,   Abdominal   (+) obese,     Abdomen: soft.   Substance History      OB/GYN          Other   arthritis,                      Anesthesia Plan    ASA 2     general     intravenous induction     Anesthetic plan, risks, benefits, and alternatives have been provided, discussed and informed consent has been obtained with: patient.    Plan discussed with CRNA.        CODE STATUS:

## 2022-12-30 NOTE — OP NOTE
NEUROSURGICAL OPERATIVE NOTE        PREOPERATIVE DIAGNOSIS:    Right L5-S1 disc herniation      POSTOPERATIVE DIAGNOSIS:  Same      PROCEDURE:  Right L5-S1 laminotomy, medial facetectomy, foraminotomy, and discectomy      SURGEON:  Yared Zapien M.D.      ASSISTANT: NITHYA Sky    PAC assisted with:   Suctioning   Retraction   Tying   Suturing   Closing   Application of dressing   Skilled neurosurgery PA assistance was necessary to perform this procedure.        ANESTHESIA:  General      ESTIMATED BLOOD LOSS: Minimal      SPECIMEN: None      DRAINS: None      COMPLICATIONS:  None      CLINICAL NOTE:  The patient is a 52-year-old woman with a protracted and progressive course of back and predominantly right buttock pain.  She has sensory alteration involving her right leg as well.  Studies demonstrate a large right L5-S1 disc herniation that provides clinical correlation.  As such the patient presents at this time for lumbar discectomy.  The nature of the procedure as well as the potential risks, complications, limitations, and alternatives to the procedure were discussed at length with the patient and the patient has agreed to proceed with surgery.      TECHNICAL NOTE:  The patient was brought to the operating room and while on her cart general endotracheal anesthesia was achieved. She was then turned prone onto the Cloward saddle frame and special care was ensured to protect pressure points. Her low back was prepared and draped in the usual fashion. A localizing radiograph was obtained with a spinal needle in the lumbosacral midline. Based on this a 2.5 to 3 cm vertical incision was fashioned overlying the L5 S1 interspace. Underlying tissues were divided with cautery to provide exposure to the right L5 and S1 hemilamina. Hemilaminotomy was fashioned. Another radiograph confirmed the operative level. The laminotomy was completed. The medial aspect of the facet was undercut. Thickened intralaminar ligament  was removed. The neural foramen was decompressed with Kerrison punch. The nerve root was retracted medially over a large subligamentous disc herniation. I incised into the disc and evacuated several large fragments. Additional disc fragments were evacuated from within the space. At completion of the procedure the dural space and nerve were well decompressed, no further disc fragments were noted. Modest bleeding points were controlled with bipolar cautery and Floseal. With the Valsalva maneuver there was no significant bleeding or evidence of CSF leak. The wound was washed out with a saline solution. The paraspinous muscle fascia were reapproximated in an interrupted fashion with 0 Vicryl suture. Marcaine 0.25% was instilled in to the paraspinous musculature and subcutaneous tissues. Subcutaneous tissues were closed in layers with 3-0 Vicryl suture. The skin was closed in a running subcuticular fashion with 3-0 Vicryl suture. A dermal sealant and sterile dressing were applied. The patient was subsequently rolled onto her cart, extubated, and taken to the recovery room in satisfactory condition.             Yraed Zapien M.D.

## 2022-12-30 NOTE — ANESTHESIA POSTPROCEDURE EVALUATION
Patient: Gypsy Mann    Procedure Summary     Date: 12/30/22 Room / Location:  [facility] /  [facility]    Anesthesia Start: 0658 Anesthesia Stop: 0801    Procedure: Lumbar discectomy L5-S1 right (Right: Spine Lumbar) Diagnosis:       Lumbar disc herniation with radiculopathy      (Lumbar disc herniation with radiculopathy [M51.16])    Surgeons: Yared Zapien MD Provider: Josh Baig MD    Anesthesia Type: general ASA Status: 2          Anesthesia Type: general    Vitals  Vitals Value Taken Time   /68 12/30/22 0758   Temp     Pulse 85 12/30/22 0759   Resp     SpO2 95 % 12/30/22 0759   Vitals shown include unvalidated device data.        Post Anesthesia Care and Evaluation    Patient location during evaluation: PACU  Patient participation: complete - patient participated  Level of consciousness: awake and alert  Pain management: adequate    Airway patency: patent  Anesthetic complications: No anesthetic complications  PONV Status: none  Cardiovascular status: hemodynamically stable and acceptable  Respiratory status: nonlabored ventilation, acceptable, nasal cannula and oral airway  Hydration status: acceptable    Comments: 97.5, [id number]

## 2022-12-30 NOTE — H&P
Pre-Op H&P  Gypsy Mann  [id number]  1970    Chief complaint: \" Low back and right buttock pain.\"    HPI:    Patient is a 52 y.o.female who presents with lumbar disc herniation with radiculopathy.  She presents today for scheduled surgery and anticipates a lumbar discectomy L5-S1-RIGHT.  She was most recently evaluated by Dr. Zapien on 12/14/2022.  She has difficulty sitting and certain positions.  The buttock pain is most significant.  Numbness in her leg intermittently occurs.  She reports last week she twisted while getting out of her car, and since this occurrence the pain in her buttocks and low back has significantly worsened.  She states Dr. Zapien's office is aware of this.  She does not take any anticoagulant or antiplatelet medications.    Review of Systems:  General ROS: negative for chills, fever or skin lesions;   Neg for recent sick exposure  Cardiovascular ROS: no chest pain or dyspnea on exertion  Respiratory ROS: no cough, shortness of breath, or wheezing    Allergies: No Known Allergies.  Denies allergy to latex or contrast dye.    Home Meds:    No current facility-administered medications on file prior to encounter.     Current Outpatient Medications on File Prior to Encounter   Medication Sig Dispense Refill   • levothyroxine (SYNTHROID, LEVOTHROID) 75 MCG tablet Take 75 mcg by mouth Daily.     • meloxicam (MOBIC) 15 MG tablet Take 15 mg by mouth Daily.     • multivitamin with minerals (MULTIVITAL PO) Take 1 tablet by mouth Daily.     • mupirocin (BACTROBAN) 2 % nasal ointment Apply to the inside of each nostril with a cotton swab two times daily, morning and evening, for 5 days before surgery. 10 each 0   • zolpidem (AMBIEN) 10 MG tablet Take 10 mg by mouth At Night As Needed for Sleep.     • chlorhexidine (HIBICLENS) 4 % external liquid Shower each day with solution for 5 days beginning 5 days before surgery. 120 mL 0   • gabapentin (NEURONTIN) 300 MG capsule 1 nightly for 3 days, 1  twice a day for 3 days, 1 three times a day thereafter 90 capsule 0       PMH:   Past Medical History:   Diagnosis Date   • Back pain    • Bronchitis    • Hypothyroid      PSH:    Past Surgical History:   Procedure Laterality Date   • BILATERAL BREAST REDUCTION Bilateral 10/31/2019    Procedure: BREAST REDUCTION BILATERAL;  Surgeon: Francis Galvan MD;  [facility]: [facility];  Service: Plastics   •  SECTION      x two   • COLONOSCOPY         Immunization History:  Influenza: Yes  Pneumococcal: No  Tetanus: No    Social History:   Tobacco:   Social History     Tobacco Use   Smoking Status Never   Smokeless Tobacco Never      Alcohol:     Social History     Substance and Sexual Activity   Alcohol Use No       Vitals:           /71 (BP Location: Right arm, Patient Position: Sitting)   Pulse 81   Temp 97.8 °F (36.6 °C) (Temporal)   Resp 16   Ht 160 cm (63\")   Wt 76.1 kg (167 lb 14.1 oz)   SpO2 96%   BMI 29.74 kg/m²     Physical Exam:  General Appearance:    Alert, cooperative, no distress, appears stated age   Head:    Normocephalic, without obvious abnormality, atraumatic   Lungs:     Clear to auscultation bilaterally, respirations unlabored    Heart:   Regular rate and rhythm, S1 and S2 normal, no murmur, rub    or gallop    Abdomen:    Soft, nontender.  +bowel sounds                       Results Review  LABS:  Lab Results   Component Value Date    WBC 6.31 2022    HGB 11.6 (L) 2022    HCT 37.0 2022    MCV 94.9 2022     2022    NEUTROABS 3.40 2022    GLUCOSE 94 2022    BUN 15 2022    CREATININE 0.59 2022    EGFRIFNONA 89 10/25/2019     2022    K 4.8 2022     2022    CO2 23.0 2022    CALCIUM 9.7 2022       RADIOLOGY:  No radiology results for the last 3 days     I reviewed the patient's new clinical results.  CBC and CHEM profile from 2022 reviewed and available within patient's  chart.    Cancer Staging (if applicable)  Cancer Patient: __ yes __no __unknown; If yes, clinical stage T:__ N:__M:__, stage group or __N/A    Impression: Patient presents with lumbar disc herniation with radiculopathy.    Plan: Dr. Zapien will perform lumbar discectomy L5-S1- RIGHT.       Jasvir Joe PA-C   12/30/22   6:44 AM EST

## 2022-12-30 NOTE — ANESTHESIA PROCEDURE NOTES
Airway  Urgency: elective    Date/Time: 12/30/2022 7:28 AM  Airway not difficult    General Information and Staff    Patient location during procedure: OR    Indications and Patient Condition  Indications for airway management: airway protection    Preoxygenated: yes  MILS not maintained throughout  Mask difficulty assessment: 1 - vent by mask    Final Airway Details  Final airway type: endotracheal airway      Successful airway: ETT  Cuffed: yes   Successful intubation technique: direct laryngoscopy  Endotracheal tube insertion site: oral  Blade: Jyoti  Blade size: 3  ETT size (mm): 7.5  Cormack-Lehane Classification: grade I - full view of glottis  Placement verified by: chest auscultation and capnometry   Measured from: lips  ETT/EBT  to lips (cm): 20  Number of attempts at approach: 1  Assessment: lips, teeth, and gum same as pre-op and atraumatic intubation    Additional Comments  Negative epigastric sounds, Breath sound equal bilaterally with symmetric chest rise and fall

## 2023-01-03 ENCOUNTER — TELEPHONE (OUTPATIENT)
Dept: NEUROSURGERY | Facility: CLINIC | Age: 53
End: 2023-01-03
Payer: COMMERCIAL

## 2023-01-03 NOTE — TELEPHONE ENCOUNTER
Caller: Gypsy Mann    Relationship to patient: Self    Best call back number: [phone]    Type of visit: POST OP    Requested date: ANY DAY BUT Friday.      If rescheduling, when is the original appointment: 1-20-22    Additional notes:PT DOES NOT DRIVE AND HER  NOT AVAILABLE ON FRIDAYS.     PLEASE CALL TO RESCHEDULE.   THANK YOU,

## 2023-01-17 ENCOUNTER — TELEPHONE (OUTPATIENT)
Dept: NEUROSURGERY | Facility: CLINIC | Age: 53
End: 2023-01-17
Payer: COMMERCIAL

## 2023-01-17 NOTE — TELEPHONE ENCOUNTER
Called and spoke w/ Pt and have explained to her that since it is the 1st Post-Op, It is usually scheduled w/ a PA (per Dr Zapien's discharge note) and if she's got any questions, Dr Zapien will be in the office available to answer. She expressed understanding and is ok to keep her appt w/ Rolando Collins PA-C on 01/25/23.

## 2023-01-17 NOTE — TELEPHONE ENCOUNTER
Provider:  Curry  Surgery/Procedure:  Lumbar discectomy L5-S1 right  Surgery/Procedure Date:  12/30/22  Last visit:   12/14/22  Next visit: 1/25/23     Reason for call: Ms. Mann called with an update. The radiculopathy pain has changed, before surgery she was having the pain in the right buttock down the right leg, now she reports that her leg feels fine, however it begins in her right buttock and radiates to the right hip now. She's not having any numbness or weakness, the pain feels very similar to what she felt before in her leg. She has followed the guidelines, no sitting, no bending or twisting or lifting over 5lbs. She is taking meloxicam 15mg daily, and gabapentin 300mg TID.    She would like to follow up with Dr. Zapien instead of Rolando as her symptoms have changed and she wants to discuss with him.

## 2023-01-19 DIAGNOSIS — M54.16 LUMBAR RADICULOPATHY: ICD-10-CM

## 2023-01-20 RX ORDER — GABAPENTIN 600 MG/1
TABLET ORAL
Qty: 90 TABLET | Refills: 0 | Status: SHIPPED | OUTPATIENT
Start: 2023-01-20 | End: 2023-03-29

## 2023-01-20 NOTE — TELEPHONE ENCOUNTER
Provider:  Curry  Caller:  Automated refill request  Surgery:  LUMBAR DISCECTOMY L5-S1 RIGHT  Surgery Date:  12/30/2022  Last visit:  Office Visit with Yared Zapien MD (12/14/2022)  Next visit: 01/25/2023  Last filled:       Reason for call:         Automated refill request for Gabapentin. Medication pending.     Requested Prescriptions     Pending Prescriptions Disp Refills   • gabapentin (NEURONTIN) 600 MG tablet [Pharmacy Med Name: Gabapentin 600 MG Oral Tablet] 90 tablet 0     Sig: TAKE 1 TABLET BY MOUTH THREE TIMES DAILY     12/22/2022 Gabapentin 600MG 1970 90 30 Moy Weber [facility]    [facility] 2  12/30/2022 Oxycodone/Acetaminophen 325MG/5MG 1970 15 5 Yared Zapien [facility]    [facility] 23 2  01/07/2023 Zolpidem Tartrate 10MG 1970 90 90 Genaro Casey [facility]  10-71  [facility] 2

## 2023-01-23 ENCOUNTER — HOSPITAL ENCOUNTER (OUTPATIENT)
Dept: MAMMOGRAPHY | Facility: HOSPITAL | Age: 53
Discharge: HOME | End: 2023-01-23
Admitting: OBSTETRICS & GYNECOLOGY
Payer: COMMERCIAL

## 2023-01-23 DIAGNOSIS — Z12.31 VISIT FOR SCREENING MAMMOGRAM: ICD-10-CM

## 2023-01-23 PROCEDURE — 77063 BREAST TOMOSYNTHESIS BI: CPT | Performed by: RADIOLOGY

## 2023-01-23 PROCEDURE — 77067 SCR MAMMO BI INCL CAD: CPT | Performed by: RADIOLOGY

## 2023-01-23 PROCEDURE — 77067 SCR MAMMO BI INCL CAD: CPT

## 2023-01-23 PROCEDURE — 77063 BREAST TOMOSYNTHESIS BI: CPT

## 2023-01-25 ENCOUNTER — OFFICE VISIT (OUTPATIENT)
Dept: NEUROSURGERY | Facility: CLINIC | Age: 53
End: 2023-01-25
Payer: COMMERCIAL

## 2023-01-25 VITALS — TEMPERATURE: 97 F | BODY MASS INDEX: 29.02 KG/M2 | RESPIRATION RATE: 17 BRPM | HEIGHT: 63 IN | WEIGHT: 163.8 LBS

## 2023-01-25 DIAGNOSIS — M54.16 LUMBAR RADICULOPATHY: Primary | ICD-10-CM

## 2023-01-25 DIAGNOSIS — M51.36 DDD (DEGENERATIVE DISC DISEASE), LUMBAR: ICD-10-CM

## 2023-01-25 PROCEDURE — 99024 POSTOP FOLLOW-UP VISIT: CPT

## 2023-01-25 NOTE — PROGRESS NOTES
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  In 2022 she developed some right buttock pain.  She had intermittent numbness extending into the S1 distribution of the right lower extremity.  There were no left lower extremity symptoms.  Symptoms worse with sitting.  She tried multiple conservative measures.  Unfortunately, her symptoms persisted.  Studies demonstrated diffuse degenerative disc disease.  There was a generous right-sided disc extrusion at L5-S1 that compromise the right S1 nerve root.  As such, she underwent a right L5-S1 discectomy by Dr. Zapien without complications.    She is here today for 3 postop incisional check.  Overall, she has noticed significant provement in her above noted symptoms.  She has an occasional ache in the upper buttock area that is usually increased with any activity.  She notes that it does not hurt to sit or drive.  She denies any numbness or tingling.  She has been increasing her activity as tolerated.  She has plans to return to work next Monday with restrictions.    Review of Systems:   Review of Systems   Constitutional: Negative.  Negative for fever and unexpected weight loss.   HENT: Negative.    Eyes: Negative.    Respiratory: Negative.    Cardiovascular: Negative.  Negative for chest pain.   Gastrointestinal: Negative.  Negative for abdominal pain.   Endocrine: Negative.    Genitourinary: Positive for pelvic pain. Negative for dysuria and urinary incontinence.   Musculoskeletal: Positive for back pain.   Allergic/Immunologic: Negative.    Neurological: Negative.  Negative for weakness and numbness.   Hematological: Negative.    Psychiatric/Behavioral: Negative.    All other systems reviewed and are  negative.      The patient's past medical history, past surgical history, family history, and social history have been reviewed at length in the electronic medical record.       Past Medical History:   Past Medical History:   Diagnosis Date   • Back pain    • Bronchitis    • Hypothyroid    • Insomnia        Past Surgical History:   Past Surgical History:   Procedure Laterality Date   • BILATERAL BREAST REDUCTION Bilateral 10/31/2019    Procedure: BREAST REDUCTION BILATERAL;  Surgeon: Francis Galvan MD;  Location:  [facility];  Service: Plastics   •  SECTION      x two   • COLONOSCOPY     • LUMBAR DISCECTOMY Right 2022    Procedure: LUMBAR DISCECTOMY L5-S1 RIGHT;  Surgeon: Yared Zapien MD;  Location:  [facility];  Service: Neurosurgery;  Laterality: Right;   • REDUCTION MAMMAPLASTY Bilateral            Family History:   Family History   Problem Relation Age of Onset   • Cancer Mother    • Hypertension Father    • Heart disease Father    • Emphysema Father    • COPD Father    • Cancer Maternal Grandmother    • Breast cancer Neg Hx    • Ovarian cancer Neg Hx        Social History:   Social History     Socioeconomic History   • Marital status:    Tobacco Use   • Smoking status: Never   • Smokeless tobacco: Never   Vaping Use   • Vaping Use: Never used   Substance and Sexual Activity   • Alcohol use: No   • Drug use: No   • Sexual activity: Defer       Immunizations:   Immunization History   Administered Date(s) Administered   • COVID-19 (PFIZER) PURPLE CAP 2020, 2021, 10/06/2021        Medications:     Current Outpatient Medications:   •  gabapentin (NEURONTIN) 600 MG tablet, TAKE 1 TABLET BY MOUTH THREE TIMES DAILY, Disp: 90 tablet, Rfl: 0  •  levothyroxine (SYNTHROID, LEVOTHROID) 75 MCG tablet, Take 75 mcg by mouth Daily., Disp: , Rfl:   •  meloxicam (MOBIC) 15 MG tablet, Take 15 mg by mouth Daily., Disp: , Rfl:   •  multivitamin with minerals (MULTIVITAL PO), Take 1 tablet by  "mouth Daily., Disp: , Rfl:   •  zolpidem (AMBIEN) 10 MG tablet, Take 10 mg by mouth At Night As Needed for Sleep., Disp: , Rfl:     Allergies:   No Known Allergies    Objective     Vital Signs  Temp 97 °F (36.1 °C) (Infrared)   Resp 17   Ht 160 cm (63\")   Wt 74.3 kg (163 lb 12.8 oz)   BMI 29.02 kg/m²   Estimated body mass index is 29.02 kg/m² as calculated from the following:    Height as of this encounter: 160 cm (63\").    Weight as of this encounter: 74.3 kg (163 lb 12.8 oz).    Physical Exam   Constitutional: Patient is well-developed and appears stated age. Pleasant and   cooperative. Appears in no acute distress.   Incision: Clean, dry, and intact.  Healing very nicely      Tobacco Use: Low Risk    • Smoking Tobacco Use: Never   • Smokeless Tobacco Use: Never   • Passive Exposure: Not on file        Body mass index is 29.02 kg/m².    Fall Risk Assessment was completed, and patient is at low risk for falls.        Assessment and Plan     Medical Decision Making     Data Review: No new imaging at this time.    Diagnosis:  Right S1 radiculopathy secondary to disc protrusion.    Treatment Options:   Mrs. Mann is here today for 3 postop visit.  She has seen significant improvement postoperatively.  She continues to increase her activity as tolerated.  She will taper down the gabapentin and meloxicam.  She has plans to return to work on January 30 with restrictions.  We discussed general do's and don'ts today.  Incision has healed nicely.  She was in full agreement and verbally understood.  She will follow-up with Dr. Zapien in 6 to 8 weeks to monitor progression.         Diagnosis Plan   1. Lumbar radiculopathy        2. DDD (degenerative disc disease), lumbar                Rolando Collins PA-C  [facility] [address] [address] [phone]  "

## 2023-01-26 ENCOUNTER — TELEPHONE (OUTPATIENT)
Dept: NEUROSURGERY | Facility: CLINIC | Age: 53
End: 2023-01-26
Payer: COMMERCIAL

## 2023-01-26 NOTE — TELEPHONE ENCOUNTER
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 PLEASE FAX THAT. PATIENT WOULD ALSO LIKE TO KNOW IF IT CAN BE LOADED INTO [employer] FOR HER TO ACCESS. PLEASE CALL PATIENT TO ADVISE IF NEEDED.    THANK YOU

## 2023-02-13 ENCOUNTER — LAB (OUTPATIENT)
Dept: LAB | Facility: HOSPITAL | Age: 53
End: 2023-02-13
Payer: COMMERCIAL

## 2023-02-13 ENCOUNTER — TELEPHONE (OUTPATIENT)
Dept: NEUROSURGERY | Facility: CLINIC | Age: 53
End: 2023-02-13
Payer: COMMERCIAL

## 2023-02-13 ENCOUNTER — OFFICE VISIT (OUTPATIENT)
Dept: NEUROSURGERY | Facility: CLINIC | Age: 53
End: 2023-02-13
Payer: COMMERCIAL

## 2023-02-13 VITALS
WEIGHT: 158.2 LBS | HEIGHT: 63 IN | TEMPERATURE: 96.6 F | DIASTOLIC BLOOD PRESSURE: 80 MMHG | SYSTOLIC BLOOD PRESSURE: 100 MMHG | BODY MASS INDEX: 28.03 KG/M2

## 2023-02-13 DIAGNOSIS — Z78.9 PRESENCE OF SURGICAL INCISION: ICD-10-CM

## 2023-02-13 DIAGNOSIS — Z98.890 HISTORY OF LUMBAR DISCECTOMY: Primary | ICD-10-CM

## 2023-02-13 PROCEDURE — 87070 CULTURE OTHR SPECIMN AEROBIC: CPT

## 2023-02-13 PROCEDURE — 99024 POSTOP FOLLOW-UP VISIT: CPT

## 2023-02-13 PROCEDURE — 87205 SMEAR GRAM STAIN: CPT

## 2023-02-13 NOTE — TELEPHONE ENCOUNTER
I have called the patient and let her know to come over at 12:00 to see Lucho.  Pooja has added her to his schedule.  Thank you.

## 2023-02-13 NOTE — PROGRESS NOTES
"Subjective   Patient: Gypsy Mann   Age, Date of Birth: 52 y.o., 1970  Sex: female    Primary Care Provider: Genaro Casey MD    Chief Complaint: Drainage from incision    History of Present Illness:  Patient is a 52-year-old female who underwent a right sided L5-S1 microdiscectomy with Dr. Yared Zapien on 12/30/2022.  Prior to the surgery she was experiencing right buttock pain that radiated to the right leg, postoperatively she has had resolution of the symptoms.  Her incision has been doing quite well, but over the last few days she notes the bottom of her incision has \"opened up.\"  Her  reported that the discharge had been cloudy, but patient denies fever, chills.  After being diagnosed with an ear infection she also has been on amoxicillin 500 mg for the last 10 days.      Current Outpatient Medications   Medication Sig Dispense Refill   • gabapentin (NEURONTIN) 600 MG tablet TAKE 1 TABLET BY MOUTH THREE TIMES DAILY 90 tablet 0   • levothyroxine (SYNTHROID, LEVOTHROID) 75 MCG tablet Take 75 mcg by mouth Daily.     • meloxicam (MOBIC) 15 MG tablet Take 15 mg by mouth Daily.     • multivitamin with minerals tablet tablet Take 1 tablet by mouth Daily.     • zolpidem (AMBIEN) 10 MG tablet Take 10 mg by mouth At Night As Needed for Sleep.       No current facility-administered medications for this visit.       Past Medical History:   Diagnosis Date   • Back pain    • Bronchitis    • Hypothyroid    • Insomnia    • Lumbosacral disc disease Aug 2022       Review of Systems   Constitutional: Negative for activity change, appetite change, chills, diaphoresis, fatigue, fever and unexpected weight change.   HENT: Negative for congestion, dental problem, drooling, ear discharge, ear pain, facial swelling, hearing loss, mouth sores, nosebleeds, postnasal drip, rhinorrhea, sinus pressure, sinus pain, sneezing, sore throat, tinnitus, trouble swallowing and voice change.    Eyes: Negative for photophobia, " "pain, discharge, redness, itching and visual disturbance.   Respiratory: Negative for apnea, cough, choking, chest tightness, shortness of breath, wheezing and stridor.    Cardiovascular: Negative for chest pain, palpitations and leg swelling.   Gastrointestinal: Negative for abdominal distention, abdominal pain, anal bleeding, blood in stool, constipation, diarrhea, nausea, rectal pain and vomiting.   Endocrine: Negative for cold intolerance, heat intolerance, polydipsia, polyphagia and polyuria.   Genitourinary: Negative for decreased urine volume, difficulty urinating, dysuria, enuresis, flank pain, frequency, genital sores, hematuria and urgency.   Musculoskeletal: Negative for back pain, gait problem, joint swelling, myalgias, neck pain and neck stiffness.   Skin: Positive for color change and rash. Negative for pallor and wound (itching).   Allergic/Immunologic: Negative for environmental allergies, food allergies and immunocompromised state.   Neurological: Negative for dizziness, tremors, seizures, syncope, facial asymmetry, speech difficulty, weakness, light-headedness, numbness and headaches.   Hematological: Negative for adenopathy. Does not bruise/bleed easily.   Psychiatric/Behavioral: Negative for agitation, behavioral problems, confusion, decreased concentration, dysphoric mood, hallucinations, self-injury, sleep disturbance and suicidal ideas. The patient is not nervous/anxious and is not hyperactive.        Objective   Vitals:    02/13/23 1208   BP: 100/80   BP Location: Left arm   Patient Position: Lying   Cuff Size: Adult   Temp: 96.6 °F (35.9 °C)   TempSrc: Infrared   Weight: 71.8 kg (158 lb 3.2 oz)   Height: 160 cm (63\")        Physical Exam:    Physical Exam  Vitals and nursing note reviewed.   Constitutional:       General: She is not in acute distress.     Appearance: Normal appearance. She is normal weight.   Skin:     Comments: Inferior portion of the incision appears to have minorly " dehisced, I was able to express sanguinous fluid from that.  It did not smell and was not cloudy.  When I used a swab to collect a specimen, I was unable to get the swab deeper than around quarter of an inch.  Picture of incision attached below.   Neurological:      Mental Status: She is alert.   Psychiatric:         Mood and Affect: Mood normal.         Behavior: Behavior normal.         Thought Content: Thought content normal.         Judgment: Judgment normal.         Tobacco Use: Low Risk    • Smoking Tobacco Use: Never   • Smokeless Tobacco Use: Never   • Passive Exposure: Not on file       BMI is >= 25 and <30. (Overweight) The following options were offered after discussion;: none (medical contraindication)      STEADI Fall Risk Assessment has not been completed.    Assessment & Plan     Data Review:  (All imaging is independently reviewed unless stated otherwise.)  No new data reviewed this time.      Medical Decision Making:  My suspicion for infection this patient is quite low and as such I will not start her on antibiotics for any labs at this time.  The dehiscence appears to be very superficial in nature, and when I attempted to swab the incision I was unable to penetrate deeper than the dermis. That being said I have obtained a specimen from her incision site and sent that off for evaluation.  She will follow-up with Marely Cam PA-C or Rolando Collins PA-C in 1 week for reevaluation of her incision.  In the interim I recommended the patient avoid submerging her incision site and keep it bandaged.  I once again educated on signs and symptoms of an infection and encouraged her to contact us if she develops any of those.  Patient encouraged to contact us if she has any changes in her condition or any concerns.     Diagnosis Plan   1. History of lumbar discectomy  Wound Culture - Surgical Site, Back, Lower      2. Presence of surgical incision  Wound Culture - Surgical Site, Back, Lower         Electronically  signed and dated:    Lucho Chin PA-C on 15:12 EST 02/13/23

## 2023-02-13 NOTE — TELEPHONE ENCOUNTER
Provider:  Curry  Surgery/Procedure:  LUMBAR DISCECTOMY L5-S1 RIGHT  Surgery/Procedure Date:  12-30-22  Last visit:   1-  Next visit: 3-17-23     Reason for call:   Patient called Devyn and is complaining with her wound.  She said at her last visit she was doing ok, she went back to work on light duty and was doing ok but now at the bottom of her wound her  said it looks like it is trying to open,and there is a small amount of what he thinks is pus. It has also been itching and red around the wound.    She is going to send a picture, but was hoping that someone could look at it today she works here on campus and can be in in 5 minutes.    No B&B problems or fever she feels fine other than the wound.    Please Advise. Thank you.    When did it start:last week    Where is it located: bottom of wound    How long has this been going on/is this new or the same as before surgery:     Characteristics of symptom/severity: Yellow drainage, itching, red,and maybe a little red    Timing- Is it constant or intermittent:constant    What makes it worse:      What makes it better: antibiotic ointment    What therapies/medications have you tried:  Keeping it clean and using the antibiotic ointment

## 2023-02-15 LAB
BACTERIA SPEC AEROBE CULT: NORMAL
GRAM STN SPEC: NORMAL

## 2023-02-22 ENCOUNTER — OFFICE VISIT (OUTPATIENT)
Dept: NEUROSURGERY | Facility: CLINIC | Age: 53
End: 2023-02-22
Payer: COMMERCIAL

## 2023-02-22 VITALS — BODY MASS INDEX: 27.46 KG/M2 | TEMPERATURE: 97.1 F | WEIGHT: 155 LBS | HEIGHT: 63 IN

## 2023-02-22 DIAGNOSIS — M54.16 LUMBAR RADICULOPATHY: Primary | ICD-10-CM

## 2023-02-22 DIAGNOSIS — M51.36 DDD (DEGENERATIVE DISC DISEASE), LUMBAR: ICD-10-CM

## 2023-02-22 DIAGNOSIS — Z98.890 HISTORY OF LUMBAR DISCECTOMY: ICD-10-CM

## 2023-02-22 PROCEDURE — 99024 POSTOP FOLLOW-UP VISIT: CPT

## 2023-02-22 NOTE — PROGRESS NOTES
"    Office Note     Name: Gypsy Mann    : 1970     MRN: [id number]     PCP: Genaro Casey MD    Chief Complaint:  Low back and right buttock pain with sensory alteration.    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  She underwent a L5-S1 discectomy with Dr. Zapien on 2022.  Prior to surgery she was experiencing right buttock pain that radiated to the lower right leg.  Postoperatively she has complete resolution of her symptoms.  She seen Lucho Chin PA-C on 2023 due to the lower portion of the incision \"opening up.\"  Sanguinous fluid was expressed from the inferior portion of the incision that day.  Culture sent off and were negative.     Today she is here for follow-up due to the above-mentioned.  The inferior portion of the incision is no longer open and there is no drainage produced. There is an eschar on the inferior portion as well. Patient has continued to deny fever or chills.  No other complaints at this time.      Review of Systems:   Review of Systems    The patient's past medical history, past surgical history, family history, and social history have been reviewed at length in the electronic medical record.       Past Medical History:   Past Medical History:   Diagnosis Date   • Back pain    • Bronchitis    • Hypothyroid    • Insomnia    • Lumbosacral disc disease Aug 2022       Past Surgical History:   Past Surgical History:   Procedure Laterality Date   • BACK SURGERY  2022    L5/S1 Diskectomy   • BILATERAL BREAST REDUCTION Bilateral 10/31/2019    Procedure: BREAST REDUCTION BILATERAL;  Surgeon: Francis Galvan MD;  Location:  [facility];  Service: Plastics   •  SECTION      x two   • COLONOSCOPY     • EPIDURAL BLOCK  22 and 22   • LUMBAR DISCECTOMY Right 2022    Procedure: LUMBAR DISCECTOMY L5-S1 RIGHT;  Surgeon: Yared Zapien MD;  Location:  [facility];  Service: " "Neurosurgery;  Laterality: Right;   • REDUCTION MAMMAPLASTY Bilateral     2020       Family History:   Family History   Problem Relation Age of Onset   • Cancer Mother         Lung/Brain   • Thyroid disease Mother    • Hypertension Father    • Heart disease Father    • Emphysema Father    • COPD Father    • Hyperlipidemia Father    • Cancer Maternal Grandmother         Lung   • Diabetes Son    • Breast cancer Neg Hx    • Ovarian cancer Neg Hx        Social History:   Social History     Socioeconomic History   • Marital status:    Tobacco Use   • Smoking status: Never   • Smokeless tobacco: Never   Vaping Use   • Vaping Use: Never used   Substance and Sexual Activity   • Alcohol use: No   • Drug use: No   • Sexual activity: Yes     Partners: Male     Birth control/protection: Surgical       Immunizations:   Immunization History   Administered Date(s) Administered   • COVID-19 (PFIZER) PURPLE CAP 12/21/2020, 01/11/2021, 10/06/2021        Medications:     Current Outpatient Medications:   •  gabapentin (NEURONTIN) 600 MG tablet, TAKE 1 TABLET BY MOUTH THREE TIMES DAILY, Disp: 90 tablet, Rfl: 0  •  levothyroxine (SYNTHROID, LEVOTHROID) 75 MCG tablet, Take 75 mcg by mouth Daily., Disp: , Rfl:   •  multivitamin with minerals tablet tablet, Take 1 tablet by mouth Daily., Disp: , Rfl:   •  zolpidem (AMBIEN) 10 MG tablet, Take 10 mg by mouth At Night As Needed for Sleep., Disp: , Rfl:     Allergies:   No Known Allergies    Objective     Vital Signs  Temp 97.1 °F (36.2 °C) (Infrared)   Ht 160 cm (62.99\")   Wt 70.3 kg (155 lb)   BMI 27.46 kg/m²   Estimated body mass index is 27.46 kg/m² as calculated from the following:    Height as of this encounter: 160 cm (62.99\").    Weight as of this encounter: 70.3 kg (155 lb).    Physical Exam   Constitutional: Patient is well-developed and appears stated age. Pleasant and   cooperative. Appears in no acute distress.   Incision: Inferior portion of the incision is intact.  " There is an eschar now over the inferior portion.  No drainage noted.    Tobacco Use: Low Risk    • Smoking Tobacco Use: Never   • Smokeless Tobacco Use: Never   • Passive Exposure: Not on file        Body mass index is 27.46 kg/m².    Fall Risk Assessment was completed, and patient is at  low risk for falls.        Assessment and Plan     Medical Decision Making     Data Review:     Diagnosis:  Right S1 radiculopathy secondary to disc protrusion    Treatment Options:    Mrs. Mann returns today following a visit for the inferior portion of her incision minorly dehiscing.  The incision has not been draining.  There is an eschar covering the inferior portion.  Cultures were sent and were negative.  She will follow-up with Dr. Zapien in a few weeks to monitor progression.          Diagnosis Plan   1. Lumbar radiculopathy        2. History of lumbar discectomy        3. DDD (degenerative disc disease), lumbar                Rolando Collins PA-C  [facility]  [facility] NEUROS[facility] [address] [address] [phone]

## 2023-03-29 ENCOUNTER — OFFICE VISIT (OUTPATIENT)
Dept: NEUROSURGERY | Facility: CLINIC | Age: 53
End: 2023-03-29
Payer: COMMERCIAL

## 2023-03-29 VITALS — BODY MASS INDEX: 26.65 KG/M2 | HEIGHT: 63 IN | RESPIRATION RATE: 18 BRPM | WEIGHT: 150.4 LBS

## 2023-03-29 DIAGNOSIS — Z98.890 S/P LUMBAR DISCECTOMY: Primary | ICD-10-CM

## 2023-03-29 PROCEDURE — 99024 POSTOP FOLLOW-UP VISIT: CPT | Performed by: NEUROLOGICAL SURGERY

## 2023-03-29 NOTE — PROGRESS NOTES
Patient: Gypsy Mann  : 1970    Primary Care Provider: Genaro Casey MD    Requesting Provider: As above        History    Chief Complaint: Low back and right lower extremity pain and sensory alteration.    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  She had a long course of a lumbar radiculopathy and ultimately on 2022 underwent right L5-S1 discectomy.  She has had near complete resolution of her symptoms.  She has done very well.  She is back to work and not having difficulties in that regard.    Review of Systems   Constitutional: Negative for activity change, appetite change, chills, diaphoresis, fatigue, fever and unexpected weight change.   HENT: Negative for congestion, dental problem, drooling, ear discharge, ear pain, facial swelling, hearing loss, mouth sores, nosebleeds, postnasal drip, rhinorrhea, sinus pressure, sneezing, sore throat, tinnitus, trouble swallowing and voice change.    Eyes: Negative for photophobia, pain, discharge, redness, itching and visual disturbance.   Respiratory: Negative for apnea, cough, choking, chest tightness, shortness of breath, wheezing and stridor.    Cardiovascular: Negative for chest pain, palpitations and leg swelling.   Gastrointestinal: Negative for abdominal distention, abdominal pain, anal bleeding, blood in stool, constipation, diarrhea, nausea, rectal pain and vomiting.   Endocrine: Negative for cold intolerance, heat intolerance, polydipsia, polyphagia and polyuria.   Genitourinary: Negative for decreased urine volume, difficulty urinating, dysuria, enuresis, flank pain, frequency, genital sores, hematuria and urgency.   Musculoskeletal: Positive for back pain. Negative for arthralgias, gait problem, joint swelling, myalgias, neck pain and neck stiffness.   Skin: Negative for color change, pallor, rash and wound.   Allergic/Immunologic: Negative for  "environmental allergies, food allergies and immunocompromised state.   Neurological: Negative for dizziness, tremors, seizures, syncope, facial asymmetry, speech difficulty, weakness, light-headedness, numbness and headaches.   Hematological: Negative for adenopathy. Does not bruise/bleed easily.   Psychiatric/Behavioral: Negative for agitation, behavioral problems, confusion, decreased concentration, dysphoric mood, hallucinations, self-injury, sleep disturbance and suicidal ideas. The patient is not nervous/anxious and is not hyperactive.    All other systems reviewed and are negative.      The patient's past medical history, past surgical history, family history, and social history have been reviewed at length in the electronic medical record.      Physical Exam:   Resp 18   Ht 160 cm (62.99\")   Wt 68.2 kg (150 lb 6.4 oz)   BMI 26.65 kg/m²   On examination her wound looks great.    Medical Decision Making    Diagnosis:   Right L5-S1 disc herniation status post discectomy.    Treatment Options:   Mr. Mann has been cleared to return to work without restriction.  She will steadily increase her activity.  At this juncture she will follow-up on an as-needed basis.       Diagnosis Plan   1. S/P lumbar discectomy            Scribed for Yared Zapien MD by Marita Muñiz CMA on 3/29/2023 17:08 EDT       I, Dr. Zapien, personally performed the services described in the documentation, as scribed in my presence, and it is both accurate and complete.  "

## 2023-05-24 ENCOUNTER — APPOINTMENT (OUTPATIENT)
Dept: OTHER | Facility: HOSPITAL | Age: 53
End: 2023-05-24
Payer: COMMERCIAL

## 2023-05-24 ENCOUNTER — ANESTHESIA EVENT (OUTPATIENT)
Dept: PERIOP | Facility: HOSPITAL | Age: 53
End: 2023-05-24
Payer: COMMERCIAL

## 2023-05-24 ENCOUNTER — APPOINTMENT (OUTPATIENT)
Dept: GENERAL RADIOLOGY | Facility: HOSPITAL | Age: 53
End: 2023-05-24
Payer: COMMERCIAL

## 2023-05-24 ENCOUNTER — ANESTHESIA (OUTPATIENT)
Dept: PERIOP | Facility: HOSPITAL | Age: 53
End: 2023-05-24
Payer: COMMERCIAL

## 2023-05-24 ENCOUNTER — HOSPITAL ENCOUNTER (OUTPATIENT)
Facility: HOSPITAL | Age: 53
Setting detail: OBSERVATION
Discharge: HOME | End: 2023-05-25
Attending: EMERGENCY MEDICINE | Admitting: INTERNAL MEDICINE
Payer: COMMERCIAL

## 2023-05-24 DIAGNOSIS — D72.829 LEUKOCYTOSIS, UNSPECIFIED TYPE: ICD-10-CM

## 2023-05-24 DIAGNOSIS — A41.9 SEPSIS, DUE TO UNSPECIFIED ORGANISM, UNSPECIFIED WHETHER ACUTE ORGAN DYSFUNCTION PRESENT: ICD-10-CM

## 2023-05-24 DIAGNOSIS — N39.0 ACUTE UTI: ICD-10-CM

## 2023-05-24 DIAGNOSIS — N20.1 URETERAL STONE: Primary | ICD-10-CM

## 2023-05-24 LAB
ALBUMIN SERPL-MCNC: 3.4 G/DL (ref 3.5–5.2)
ALBUMIN/GLOB SERPL: 1 G/DL
ALP SERPL-CCNC: 123 U/L (ref 39–117)
ALT SERPL W P-5'-P-CCNC: 13 U/L (ref 1–33)
ANION GAP SERPL CALCULATED.3IONS-SCNC: 12 MMOL/L (ref 5–15)
AST SERPL-CCNC: 19 U/L (ref 1–32)
B-HCG UR QL: NEGATIVE
BACTERIA UR QL AUTO: ABNORMAL /HPF
BASOPHILS # BLD AUTO: 0.02 10*3/MM3 (ref 0–0.2)
BASOPHILS NFR BLD AUTO: 0.2 % (ref 0–1.5)
BILIRUB SERPL-MCNC: 0.3 MG/DL (ref 0–1.2)
BILIRUB UR QL STRIP: NEGATIVE
BUN SERPL-MCNC: 9 MG/DL (ref 6–20)
BUN/CREAT SERPL: 12 (ref 7–25)
CALCIUM SPEC-SCNC: 9.1 MG/DL (ref 8.6–10.5)
CHLORIDE SERPL-SCNC: 100 MMOL/L (ref 98–107)
CK SERPL-CCNC: 27 U/L (ref 20–180)
CLARITY UR: CLEAR
CO2 SERPL-SCNC: 23 MMOL/L (ref 22–29)
COLOR UR: YELLOW
CREAT SERPL-MCNC: 0.75 MG/DL (ref 0.57–1)
D-LACTATE SERPL-SCNC: 0.8 MMOL/L (ref 0.5–2)
DEPRECATED RDW RBC AUTO: 43.4 FL (ref 37–54)
EGFRCR SERPLBLD CKD-EPI 2021: 94.7 ML/MIN/1.73
EOSINOPHIL # BLD AUTO: 0.03 10*3/MM3 (ref 0–0.4)
EOSINOPHIL NFR BLD AUTO: 0.2 % (ref 0.3–6.2)
ERYTHROCYTE [DISTWIDTH] IN BLOOD BY AUTOMATED COUNT: 12.9 % (ref 12.3–15.4)
FERRITIN SERPL-MCNC: 301.1 NG/ML (ref 13–150)
GLOBULIN UR ELPH-MCNC: 3.4 GM/DL
GLUCOSE SERPL-MCNC: 104 MG/DL (ref 65–99)
GLUCOSE UR STRIP-MCNC: NEGATIVE MG/DL
HCT VFR BLD AUTO: 33.2 % (ref 34–46.6)
HGB BLD-MCNC: 10.1 G/DL (ref 12–15.9)
HGB UR QL STRIP.AUTO: ABNORMAL
HOLD SPECIMEN: NORMAL
HYALINE CASTS UR QL AUTO: ABNORMAL /LPF
IMM GRANULOCYTES # BLD AUTO: 0.1 10*3/MM3 (ref 0–0.05)
IMM GRANULOCYTES NFR BLD AUTO: 0.8 % (ref 0–0.5)
IRON 24H UR-MRATE: 7 MCG/DL (ref 37–145)
IRON SATN MFR SERPL: 3 % (ref 20–50)
KETONES UR QL STRIP: NEGATIVE
LEUKOCYTE ESTERASE UR QL STRIP.AUTO: ABNORMAL
LIPASE SERPL-CCNC: 16 U/L (ref 13–60)
LYMPHOCYTES # BLD AUTO: 1.46 10*3/MM3 (ref 0.7–3.1)
LYMPHOCYTES NFR BLD AUTO: 11.2 % (ref 19.6–45.3)
MCH RBC QN AUTO: 27.8 PG (ref 26.6–33)
MCHC RBC AUTO-ENTMCNC: 30.4 G/DL (ref 31.5–35.7)
MCV RBC AUTO: 91.5 FL (ref 79–97)
MONOCYTES # BLD AUTO: 0.99 10*3/MM3 (ref 0.1–0.9)
MONOCYTES NFR BLD AUTO: 7.6 % (ref 5–12)
NEUTROPHILS NFR BLD AUTO: 10.47 10*3/MM3 (ref 1.7–7)
NEUTROPHILS NFR BLD AUTO: 80 % (ref 42.7–76)
NITRITE UR QL STRIP: NEGATIVE
NRBC BLD AUTO-RTO: 0 /100 WBC (ref 0–0.2)
PH UR STRIP.AUTO: 6.5 [PH] (ref 5–8)
PLATELET # BLD AUTO: 321 10*3/MM3 (ref 140–450)
PMV BLD AUTO: 9.7 FL (ref 6–12)
POTASSIUM SERPL-SCNC: 3.5 MMOL/L (ref 3.5–5.2)
PROCALCITONIN SERPL-MCNC: 1.6 NG/ML (ref 0–0.25)
PROT SERPL-MCNC: 6.8 G/DL (ref 6–8.5)
PROT UR QL STRIP: NEGATIVE
RBC # BLD AUTO: 3.63 10*6/MM3 (ref 3.77–5.28)
RBC # UR STRIP: ABNORMAL /HPF
REF LAB TEST METHOD: ABNORMAL
SODIUM SERPL-SCNC: 135 MMOL/L (ref 136–145)
SP GR UR STRIP: <=1.005 (ref 1–1.03)
SQUAMOUS #/AREA URNS HPF: ABNORMAL /HPF
TIBC SERPL-MCNC: 229 MCG/DL (ref 298–536)
TRANS CELLS #/AREA URNS HPF: ABNORMAL /HPF
TRANSFERRIN SERPL-MCNC: 154 MG/DL (ref 200–360)
TSH SERPL DL<=0.05 MIU/L-ACNC: 2.89 UIU/ML (ref 0.27–4.2)
UROBILINOGEN UR QL STRIP: ABNORMAL
WBC # UR STRIP: ABNORMAL /HPF
WBC NRBC COR # BLD: 13.07 10*3/MM3 (ref 3.4–10.8)
WHOLE BLOOD HOLD COAG: NORMAL
WHOLE BLOOD HOLD SPECIMEN: NORMAL

## 2023-05-24 PROCEDURE — G0378 HOSPITAL OBSERVATION PER HR: HCPCS

## 2023-05-24 PROCEDURE — 82550 ASSAY OF CK (CPK): CPT | Performed by: NURSE PRACTITIONER

## 2023-05-24 PROCEDURE — 87077 CULTURE AEROBIC IDENTIFY: CPT | Performed by: NURSE PRACTITIONER

## 2023-05-24 PROCEDURE — C1769 GUIDE WIRE: HCPCS | Performed by: STUDENT IN AN ORGANIZED HEALTH CARE EDUCATION/TRAINING PROGRAM

## 2023-05-24 PROCEDURE — 25010000002 HYDROMORPHONE PER 4 MG: Performed by: INTERNAL MEDICINE

## 2023-05-24 PROCEDURE — C2617 STENT, NON-COR, TEM W/O DEL: HCPCS | Performed by: STUDENT IN AN ORGANIZED HEALTH CARE EDUCATION/TRAINING PROGRAM

## 2023-05-24 PROCEDURE — 36415 COLL VENOUS BLD VENIPUNCTURE: CPT

## 2023-05-24 PROCEDURE — 83690 ASSAY OF LIPASE: CPT | Performed by: EMERGENCY MEDICINE

## 2023-05-24 PROCEDURE — C1758 CATHETER, URETERAL: HCPCS | Performed by: STUDENT IN AN ORGANIZED HEALTH CARE EDUCATION/TRAINING PROGRAM

## 2023-05-24 PROCEDURE — 25010000002 FENTANYL CITRATE (PF) 100 MCG/2ML SOLUTION: Performed by: NURSE ANESTHETIST, CERTIFIED REGISTERED

## 2023-05-24 PROCEDURE — 83605 ASSAY OF LACTIC ACID: CPT | Performed by: EMERGENCY MEDICINE

## 2023-05-24 PROCEDURE — 84145 PROCALCITONIN (PCT): CPT | Performed by: NURSE PRACTITIONER

## 2023-05-24 PROCEDURE — 25010000002 HYDROMORPHONE PER 4 MG: Performed by: STUDENT IN AN ORGANIZED HEALTH CARE EDUCATION/TRAINING PROGRAM

## 2023-05-24 PROCEDURE — 87186 SC STD MICRODIL/AGAR DIL: CPT | Performed by: NURSE PRACTITIONER

## 2023-05-24 PROCEDURE — 82728 ASSAY OF FERRITIN: CPT | Performed by: INTERNAL MEDICINE

## 2023-05-24 PROCEDURE — 87040 BLOOD CULTURE FOR BACTERIA: CPT | Performed by: NURSE PRACTITIONER

## 2023-05-24 PROCEDURE — 81001 URINALYSIS AUTO W/SCOPE: CPT | Performed by: EMERGENCY MEDICINE

## 2023-05-24 PROCEDURE — 52332 CYSTOSCOPY AND TREATMENT: CPT | Performed by: STUDENT IN AN ORGANIZED HEALTH CARE EDUCATION/TRAINING PROGRAM

## 2023-05-24 PROCEDURE — 96365 THER/PROPH/DIAG IV INF INIT: CPT

## 2023-05-24 PROCEDURE — 87086 URINE CULTURE/COLONY COUNT: CPT | Performed by: NURSE PRACTITIONER

## 2023-05-24 PROCEDURE — 80050 GENERAL HEALTH PANEL: CPT | Performed by: EMERGENCY MEDICINE

## 2023-05-24 PROCEDURE — 52351 CYSTOURETERO & OR PYELOSCOPE: CPT | Performed by: STUDENT IN AN ORGANIZED HEALTH CARE EDUCATION/TRAINING PROGRAM

## 2023-05-24 PROCEDURE — 25010000002 DEXAMETHASONE PER 1 MG: Performed by: NURSE ANESTHETIST, CERTIFIED REGISTERED

## 2023-05-24 PROCEDURE — 25010000002 PROPOFOL 10 MG/ML EMULSION: Performed by: NURSE ANESTHETIST, CERTIFIED REGISTERED

## 2023-05-24 PROCEDURE — 81025 URINE PREGNANCY TEST: CPT | Performed by: STUDENT IN AN ORGANIZED HEALTH CARE EDUCATION/TRAINING PROGRAM

## 2023-05-24 PROCEDURE — 99222 1ST HOSP IP/OBS MODERATE 55: CPT | Performed by: INTERNAL MEDICINE

## 2023-05-24 PROCEDURE — 25510000001 IOPAMIDOL 61 % SOLUTION: Performed by: STUDENT IN AN ORGANIZED HEALTH CARE EDUCATION/TRAINING PROGRAM

## 2023-05-24 PROCEDURE — 83540 ASSAY OF IRON: CPT | Performed by: INTERNAL MEDICINE

## 2023-05-24 PROCEDURE — 84466 ASSAY OF TRANSFERRIN: CPT | Performed by: INTERNAL MEDICINE

## 2023-05-24 PROCEDURE — 74018 RADEX ABDOMEN 1 VIEW: CPT

## 2023-05-24 PROCEDURE — 76000 FLUOROSCOPY <1 HR PHYS/QHP: CPT

## 2023-05-24 PROCEDURE — 74420 UROGRAPHY RTRGR +-KUB: CPT | Performed by: STUDENT IN AN ORGANIZED HEALTH CARE EDUCATION/TRAINING PROGRAM

## 2023-05-24 PROCEDURE — 25010000002 CEFTRIAXONE PER 250 MG: Performed by: NURSE PRACTITIONER

## 2023-05-24 PROCEDURE — 99222 1ST HOSP IP/OBS MODERATE 55: CPT | Performed by: STUDENT IN AN ORGANIZED HEALTH CARE EDUCATION/TRAINING PROGRAM

## 2023-05-24 PROCEDURE — 96375 TX/PRO/DX INJ NEW DRUG ADDON: CPT

## 2023-05-24 PROCEDURE — 99284 EMERGENCY DEPT VISIT MOD MDM: CPT

## 2023-05-24 PROCEDURE — 99285 EMERGENCY DEPT VISIT HI MDM: CPT

## 2023-05-24 PROCEDURE — 99024 POSTOP FOLLOW-UP VISIT: CPT | Performed by: STUDENT IN AN ORGANIZED HEALTH CARE EDUCATION/TRAINING PROGRAM

## 2023-05-24 DEVICE — URETERAL STENT
Type: IMPLANTABLE DEVICE | Site: URETER | Status: FUNCTIONAL
Brand: PERCUFLEX™ PLUS

## 2023-05-24 RX ORDER — MAGNESIUM HYDROXIDE 1200 MG/15ML
LIQUID ORAL AS NEEDED
Status: DISCONTINUED | OUTPATIENT
Start: 2023-05-24 | End: 2023-05-24 | Stop reason: HOSPADM

## 2023-05-24 RX ORDER — CHOLECALCIFEROL (VITAMIN D3) 125 MCG
10 CAPSULE ORAL NIGHTLY PRN
Status: DISCONTINUED | OUTPATIENT
Start: 2023-05-24 | End: 2023-05-25 | Stop reason: HOSPADM

## 2023-05-24 RX ORDER — DROPERIDOL 2.5 MG/ML
0.62 INJECTION, SOLUTION INTRAMUSCULAR; INTRAVENOUS
Status: DISCONTINUED | OUTPATIENT
Start: 2023-05-24 | End: 2023-05-24 | Stop reason: HOSPADM

## 2023-05-24 RX ORDER — FENTANYL CITRATE 50 UG/ML
INJECTION, SOLUTION INTRAMUSCULAR; INTRAVENOUS AS NEEDED
Status: DISCONTINUED | OUTPATIENT
Start: 2023-05-24 | End: 2023-05-24 | Stop reason: SURG

## 2023-05-24 RX ORDER — PROMETHAZINE HYDROCHLORIDE 25 MG/1
25 TABLET ORAL ONCE AS NEEDED
Status: DISCONTINUED | OUTPATIENT
Start: 2023-05-24 | End: 2023-05-24 | Stop reason: HOSPADM

## 2023-05-24 RX ORDER — HYDROCODONE BITARTRATE AND ACETAMINOPHEN 5; 325 MG/1; MG/1
1 TABLET ORAL ONCE AS NEEDED
Status: DISCONTINUED | OUTPATIENT
Start: 2023-05-24 | End: 2023-05-24 | Stop reason: HOSPADM

## 2023-05-24 RX ORDER — PROMETHAZINE HYDROCHLORIDE 25 MG/1
25 SUPPOSITORY RECTAL ONCE AS NEEDED
Status: DISCONTINUED | OUTPATIENT
Start: 2023-05-24 | End: 2023-05-24 | Stop reason: HOSPADM

## 2023-05-24 RX ORDER — OXYCODONE HYDROCHLORIDE AND ACETAMINOPHEN 5; 325 MG/1; MG/1
1 TABLET ORAL EVERY 6 HOURS PRN
Status: DISCONTINUED | OUTPATIENT
Start: 2023-05-24 | End: 2023-05-25 | Stop reason: HOSPADM

## 2023-05-24 RX ORDER — SODIUM CHLORIDE 9 MG/ML
40 INJECTION, SOLUTION INTRAVENOUS AS NEEDED
Status: DISCONTINUED | OUTPATIENT
Start: 2023-05-24 | End: 2023-05-25 | Stop reason: HOSPADM

## 2023-05-24 RX ORDER — LIDOCAINE HYDROCHLORIDE 20 MG/ML
JELLY TOPICAL AS NEEDED
Status: DISCONTINUED | OUTPATIENT
Start: 2023-05-24 | End: 2023-05-24 | Stop reason: HOSPADM

## 2023-05-24 RX ORDER — BISACODYL 10 MG
10 SUPPOSITORY, RECTAL RECTAL DAILY PRN
Status: DISCONTINUED | OUTPATIENT
Start: 2023-05-24 | End: 2023-05-25 | Stop reason: HOSPADM

## 2023-05-24 RX ORDER — ONDANSETRON 4 MG/1
4 TABLET, ORALLY DISINTEGRATING ORAL EVERY 6 HOURS PRN
Status: ON HOLD | COMMUNITY
End: 2023-05-24

## 2023-05-24 RX ORDER — ACETAMINOPHEN 160 MG/5ML
650 SOLUTION ORAL EVERY 4 HOURS PRN
Status: DISCONTINUED | OUTPATIENT
Start: 2023-05-24 | End: 2023-05-25 | Stop reason: HOSPADM

## 2023-05-24 RX ORDER — POLYETHYLENE GLYCOL 3350 17 G/17G
17 POWDER, FOR SOLUTION ORAL DAILY PRN
Status: DISCONTINUED | OUTPATIENT
Start: 2023-05-24 | End: 2023-05-25 | Stop reason: HOSPADM

## 2023-05-24 RX ORDER — SODIUM CHLORIDE 9 MG/ML
40 INJECTION, SOLUTION INTRAVENOUS AS NEEDED
Status: DISCONTINUED | OUTPATIENT
Start: 2023-05-24 | End: 2023-05-24 | Stop reason: HOSPADM

## 2023-05-24 RX ORDER — HYOSCYAMINE SULFATE 0.125 MG
125 TABLET,DISINTEGRATING ORAL EVERY 4 HOURS PRN
Status: DISCONTINUED | OUTPATIENT
Start: 2023-05-24 | End: 2023-05-25 | Stop reason: HOSPADM

## 2023-05-24 RX ORDER — SODIUM CHLORIDE 0.9 % (FLUSH) 0.9 %
10 SYRINGE (ML) INJECTION EVERY 12 HOURS SCHEDULED
Status: DISCONTINUED | OUTPATIENT
Start: 2023-05-24 | End: 2023-05-25 | Stop reason: HOSPADM

## 2023-05-24 RX ORDER — MEPERIDINE HYDROCHLORIDE 25 MG/ML
12.5 INJECTION INTRAMUSCULAR; INTRAVENOUS; SUBCUTANEOUS
Status: DISCONTINUED | OUTPATIENT
Start: 2023-05-24 | End: 2023-05-24 | Stop reason: HOSPADM

## 2023-05-24 RX ORDER — DROPERIDOL 2.5 MG/ML
0.62 INJECTION, SOLUTION INTRAMUSCULAR; INTRAVENOUS ONCE AS NEEDED
Status: DISCONTINUED | OUTPATIENT
Start: 2023-05-24 | End: 2023-05-24 | Stop reason: HOSPADM

## 2023-05-24 RX ORDER — PROPOFOL 10 MG/ML
VIAL (ML) INTRAVENOUS AS NEEDED
Status: DISCONTINUED | OUTPATIENT
Start: 2023-05-24 | End: 2023-05-24 | Stop reason: SURG

## 2023-05-24 RX ORDER — HYDROMORPHONE HYDROCHLORIDE 1 MG/ML
0.25 INJECTION, SOLUTION INTRAMUSCULAR; INTRAVENOUS; SUBCUTANEOUS EVERY 4 HOURS PRN
Status: DISCONTINUED | OUTPATIENT
Start: 2023-05-24 | End: 2023-05-25 | Stop reason: HOSPADM

## 2023-05-24 RX ORDER — TAMSULOSIN HYDROCHLORIDE 0.4 MG/1
0.4 CAPSULE ORAL DAILY
Status: DISCONTINUED | OUTPATIENT
Start: 2023-05-24 | End: 2023-05-25 | Stop reason: HOSPADM

## 2023-05-24 RX ORDER — OXYCODONE HYDROCHLORIDE AND ACETAMINOPHEN 5; 325 MG/1; MG/1
1 TABLET ORAL EVERY 4 HOURS PRN
COMMUNITY

## 2023-05-24 RX ORDER — SODIUM CHLORIDE 0.9 % (FLUSH) 0.9 %
3-10 SYRINGE (ML) INJECTION AS NEEDED
Status: DISCONTINUED | OUTPATIENT
Start: 2023-05-24 | End: 2023-05-24 | Stop reason: HOSPADM

## 2023-05-24 RX ORDER — BISACODYL 5 MG/1
5 TABLET, DELAYED RELEASE ORAL DAILY PRN
Status: DISCONTINUED | OUTPATIENT
Start: 2023-05-24 | End: 2023-05-25 | Stop reason: HOSPADM

## 2023-05-24 RX ORDER — ONDANSETRON 2 MG/ML
4 INJECTION INTRAMUSCULAR; INTRAVENOUS EVERY 6 HOURS PRN
Status: DISCONTINUED | OUTPATIENT
Start: 2023-05-24 | End: 2023-05-24

## 2023-05-24 RX ORDER — NALOXONE HCL 0.4 MG/ML
0.4 VIAL (ML) INJECTION
Status: DISCONTINUED | OUTPATIENT
Start: 2023-05-24 | End: 2023-05-25 | Stop reason: HOSPADM

## 2023-05-24 RX ORDER — SODIUM CHLORIDE 0.9 % (FLUSH) 0.9 %
10 SYRINGE (ML) INJECTION AS NEEDED
Status: DISCONTINUED | OUTPATIENT
Start: 2023-05-24 | End: 2023-05-25 | Stop reason: HOSPADM

## 2023-05-24 RX ORDER — FENTANYL CITRATE 50 UG/ML
50 INJECTION, SOLUTION INTRAMUSCULAR; INTRAVENOUS
Status: DISCONTINUED | OUTPATIENT
Start: 2023-05-24 | End: 2023-05-24 | Stop reason: HOSPADM

## 2023-05-24 RX ORDER — SODIUM CHLORIDE 0.9 % (FLUSH) 0.9 %
3 SYRINGE (ML) INJECTION EVERY 12 HOURS SCHEDULED
Status: DISCONTINUED | OUTPATIENT
Start: 2023-05-24 | End: 2023-05-24 | Stop reason: HOSPADM

## 2023-05-24 RX ORDER — LABETALOL HYDROCHLORIDE 5 MG/ML
5 INJECTION, SOLUTION INTRAVENOUS
Status: DISCONTINUED | OUTPATIENT
Start: 2023-05-24 | End: 2023-05-24 | Stop reason: HOSPADM

## 2023-05-24 RX ORDER — LEVOTHYROXINE SODIUM 0.07 MG/1
75 TABLET ORAL
Status: DISCONTINUED | OUTPATIENT
Start: 2023-05-25 | End: 2023-05-25 | Stop reason: HOSPADM

## 2023-05-24 RX ORDER — ONDANSETRON 4 MG/1
4 TABLET, FILM COATED ORAL EVERY 6 HOURS PRN
Status: DISCONTINUED | OUTPATIENT
Start: 2023-05-24 | End: 2023-05-24

## 2023-05-24 RX ORDER — PROMETHAZINE HYDROCHLORIDE 25 MG/1
25 SUPPOSITORY RECTAL EVERY 6 HOURS PRN
Status: DISCONTINUED | OUTPATIENT
Start: 2023-05-24 | End: 2023-05-25 | Stop reason: HOSPADM

## 2023-05-24 RX ORDER — HYDRALAZINE HYDROCHLORIDE 20 MG/ML
5 INJECTION INTRAMUSCULAR; INTRAVENOUS
Status: DISCONTINUED | OUTPATIENT
Start: 2023-05-24 | End: 2023-05-24 | Stop reason: HOSPADM

## 2023-05-24 RX ORDER — LIDOCAINE HYDROCHLORIDE 10 MG/ML
INJECTION, SOLUTION EPIDURAL; INFILTRATION; INTRACAUDAL; PERINEURAL AS NEEDED
Status: DISCONTINUED | OUTPATIENT
Start: 2023-05-24 | End: 2023-05-24 | Stop reason: SURG

## 2023-05-24 RX ORDER — NALOXONE HCL 0.4 MG/ML
0.4 VIAL (ML) INJECTION AS NEEDED
Status: DISCONTINUED | OUTPATIENT
Start: 2023-05-24 | End: 2023-05-24 | Stop reason: HOSPADM

## 2023-05-24 RX ORDER — IPRATROPIUM BROMIDE AND ALBUTEROL SULFATE 2.5; .5 MG/3ML; MG/3ML
3 SOLUTION RESPIRATORY (INHALATION) ONCE AS NEEDED
Status: DISCONTINUED | OUTPATIENT
Start: 2023-05-24 | End: 2023-05-24 | Stop reason: HOSPADM

## 2023-05-24 RX ORDER — PROMETHAZINE HYDROCHLORIDE 25 MG/1
25 TABLET ORAL EVERY 6 HOURS PRN
Status: DISCONTINUED | OUTPATIENT
Start: 2023-05-24 | End: 2023-05-25 | Stop reason: HOSPADM

## 2023-05-24 RX ORDER — OXYBUTYNIN CHLORIDE 5 MG/1
5 TABLET, EXTENDED RELEASE ORAL DAILY
Status: DISCONTINUED | OUTPATIENT
Start: 2023-05-24 | End: 2023-05-25 | Stop reason: HOSPADM

## 2023-05-24 RX ORDER — SODIUM CHLORIDE, SODIUM LACTATE, POTASSIUM CHLORIDE, CALCIUM CHLORIDE 600; 310; 30; 20 MG/100ML; MG/100ML; MG/100ML; MG/100ML
9 INJECTION, SOLUTION INTRAVENOUS CONTINUOUS PRN
Status: DISCONTINUED | OUTPATIENT
Start: 2023-05-24 | End: 2023-05-25 | Stop reason: HOSPADM

## 2023-05-24 RX ORDER — SODIUM CHLORIDE, SODIUM LACTATE, POTASSIUM CHLORIDE, CALCIUM CHLORIDE 600; 310; 30; 20 MG/100ML; MG/100ML; MG/100ML; MG/100ML
100 INJECTION, SOLUTION INTRAVENOUS CONTINUOUS
Status: DISCONTINUED | OUTPATIENT
Start: 2023-05-24 | End: 2023-05-25 | Stop reason: HOSPADM

## 2023-05-24 RX ORDER — ACETAMINOPHEN 325 MG/1
650 TABLET ORAL EVERY 4 HOURS PRN
Status: DISCONTINUED | OUTPATIENT
Start: 2023-05-24 | End: 2023-05-25 | Stop reason: HOSPADM

## 2023-05-24 RX ORDER — PROMETHAZINE HYDROCHLORIDE 6.25 MG/5ML
25 SYRUP ORAL EVERY 6 HOURS PRN
Status: DISCONTINUED | OUTPATIENT
Start: 2023-05-24 | End: 2023-05-25 | Stop reason: HOSPADM

## 2023-05-24 RX ORDER — SODIUM CHLORIDE 0.9 % (FLUSH) 0.9 %
10 SYRINGE (ML) INJECTION EVERY 12 HOURS SCHEDULED
Status: DISCONTINUED | OUTPATIENT
Start: 2023-05-24 | End: 2023-05-24 | Stop reason: HOSPADM

## 2023-05-24 RX ORDER — AMOXICILLIN 250 MG
2 CAPSULE ORAL 2 TIMES DAILY
Status: DISCONTINUED | OUTPATIENT
Start: 2023-05-24 | End: 2023-05-25 | Stop reason: HOSPADM

## 2023-05-24 RX ORDER — SODIUM CHLORIDE 9 MG/ML
10 INJECTION INTRAVENOUS AS NEEDED
Status: DISCONTINUED | OUTPATIENT
Start: 2023-05-24 | End: 2023-05-25 | Stop reason: HOSPADM

## 2023-05-24 RX ORDER — ONDANSETRON 2 MG/ML
4 INJECTION INTRAMUSCULAR; INTRAVENOUS ONCE AS NEEDED
Status: DISCONTINUED | OUTPATIENT
Start: 2023-05-24 | End: 2023-05-24 | Stop reason: HOSPADM

## 2023-05-24 RX ORDER — SODIUM CHLORIDE 0.9 % (FLUSH) 0.9 %
10 SYRINGE (ML) INJECTION AS NEEDED
Status: DISCONTINUED | OUTPATIENT
Start: 2023-05-24 | End: 2023-05-24 | Stop reason: HOSPADM

## 2023-05-24 RX ORDER — ONDANSETRON 2 MG/ML
4 INJECTION INTRAMUSCULAR; INTRAVENOUS ONCE
Status: DISCONTINUED | OUTPATIENT
Start: 2023-05-24 | End: 2023-05-24

## 2023-05-24 RX ORDER — DEXAMETHASONE SODIUM PHOSPHATE 4 MG/ML
INJECTION, SOLUTION INTRA-ARTICULAR; INTRALESIONAL; INTRAMUSCULAR; INTRAVENOUS; SOFT TISSUE AS NEEDED
Status: DISCONTINUED | OUTPATIENT
Start: 2023-05-24 | End: 2023-05-24 | Stop reason: SURG

## 2023-05-24 RX ORDER — FAMOTIDINE 20 MG/1
20 TABLET, FILM COATED ORAL
Status: COMPLETED | OUTPATIENT
Start: 2023-05-24 | End: 2023-05-24

## 2023-05-24 RX ORDER — ACETAMINOPHEN 650 MG/1
650 SUPPOSITORY RECTAL EVERY 4 HOURS PRN
Status: DISCONTINUED | OUTPATIENT
Start: 2023-05-24 | End: 2023-05-25 | Stop reason: HOSPADM

## 2023-05-24 RX ADMIN — FENTANYL CITRATE 100 MCG: 50 INJECTION, SOLUTION INTRAMUSCULAR; INTRAVENOUS at 13:48

## 2023-05-24 RX ADMIN — SODIUM CHLORIDE, POTASSIUM CHLORIDE, SODIUM LACTATE AND CALCIUM CHLORIDE 100 ML/HR: 600; 310; 30; 20 INJECTION, SOLUTION INTRAVENOUS at 16:12

## 2023-05-24 RX ADMIN — SODIUM CHLORIDE 1 G: 900 INJECTION INTRAVENOUS at 09:17

## 2023-05-24 RX ADMIN — SODIUM CHLORIDE, POTASSIUM CHLORIDE, SODIUM LACTATE AND CALCIUM CHLORIDE 9 ML/HR: 600; 310; 30; 20 INJECTION, SOLUTION INTRAVENOUS at 13:23

## 2023-05-24 RX ADMIN — DEXAMETHASONE SODIUM PHOSPHATE 8 MG: 4 INJECTION, SOLUTION INTRAMUSCULAR; INTRAVENOUS at 13:48

## 2023-05-24 RX ADMIN — TAMSULOSIN HYDROCHLORIDE 0.4 MG: 0.4 CAPSULE ORAL at 16:48

## 2023-05-24 RX ADMIN — OXYBUTYNIN CHLORIDE 5 MG: 5 TABLET, EXTENDED RELEASE ORAL at 16:48

## 2023-05-24 RX ADMIN — FAMOTIDINE 20 MG: 20 TABLET ORAL at 13:23

## 2023-05-24 RX ADMIN — LIDOCAINE HYDROCHLORIDE 40 MG: 10 INJECTION, SOLUTION EPIDURAL; INFILTRATION; INTRACAUDAL; PERINEURAL at 13:48

## 2023-05-24 RX ADMIN — DOCUSATE SODIUM 50 MG AND SENNOSIDES 8.6 MG 2 TABLET: 8.6; 5 TABLET, FILM COATED ORAL at 20:02

## 2023-05-24 RX ADMIN — Medication 10 MG: at 20:04

## 2023-05-24 RX ADMIN — Medication 10 ML: at 20:03

## 2023-05-24 RX ADMIN — SODIUM CHLORIDE, POTASSIUM CHLORIDE, SODIUM LACTATE AND CALCIUM CHLORIDE 100 ML/HR: 600; 310; 30; 20 INJECTION, SOLUTION INTRAVENOUS at 12:09

## 2023-05-24 RX ADMIN — SODIUM CHLORIDE 1000 ML: 9 INJECTION, SOLUTION INTRAVENOUS at 07:53

## 2023-05-24 RX ADMIN — PROPOFOL 160 MG: 10 INJECTION, EMULSION INTRAVENOUS at 13:48

## 2023-05-24 RX ADMIN — HYDROMORPHONE HYDROCHLORIDE 0.25 MG: 1 INJECTION, SOLUTION INTRAMUSCULAR; INTRAVENOUS; SUBCUTANEOUS at 12:09

## 2023-05-24 NOTE — PLAN OF CARE
Goal Outcome Evaluation:  Plan of Care Reviewed With: patient, spouse           Outcome Evaluation: pt had right ureteral stent placed today. doing well post-op,eating,drinking, and urinating without difficulty. no pain

## 2023-05-24 NOTE — ANESTHESIA PREPROCEDURE EVALUATION
Anesthesia Evaluation     Patient summary reviewed and Nursing notes reviewed                Airway   Mallampati: II  TM distance: >3 FB  Neck ROM: full  No difficulty expected  Dental - normal exam     Pulmonary - negative pulmonary ROS and normal exam   Cardiovascular - negative cardio ROS and normal exam        Neuro/Psych  (+) numbness,    GI/Hepatic/Renal/Endo    (+)   thyroid problem hypothyroidism    Musculoskeletal     (+) back pain,   Abdominal  - normal exam    Bowel sounds: normal.   Substance History - negative use     OB/GYN negative ob/gyn ROS         Other                        Anesthesia Plan    ASA 2     general     intravenous induction     Anesthetic plan, risks, benefits, and alternatives have been provided, discussed and informed consent has been obtained with: patient.    Plan discussed with CRNA.        CODE STATUS:    Level Of Support Discussed With: Patient  Code Status (Patient has no pulse and is not breathing): CPR (Attempt to Resuscitate)  Medical Interventions (Patient has pulse or is breathing): Full Support

## 2023-05-24 NOTE — ANESTHESIA POSTPROCEDURE EVALUATION
Patient: Gypsy Mann    Procedure Summary     Date: 05/24/23 Room / Location:  [facility]    Anesthesia Start: 1344 Anesthesia Stop: 1427    Procedure: CYSTOSCOPY RIGHT RETROGRADE PYELOGRAM AND RIGHT URETERAL STENT PLACEMENT (Right: Ureter) Diagnosis:     Surgeons: Parag Pascual MD Provider: Edwin Drake MD    Anesthesia Type: general ASA Status: 2          Anesthesia Type: general    Vitals  No vitals data found for the desired time range.          Post Anesthesia Care and Evaluation    Patient location during evaluation: PACU  Patient participation: complete - patient participated  Level of consciousness: awake  Pain management: adequate    Airway patency: patent  Anesthetic complications: No anesthetic complications  PONV Status: none  Cardiovascular status: hemodynamically stable and acceptable  Respiratory status: nonlabored ventilation, acceptable and nasal cannula  Hydration status: acceptable

## 2023-05-24 NOTE — ED PROVIDER NOTES
Subjective   History of Present Illness  Pleasant pt with right flank pain for several days. Seen at [facility] and  with right KS 6mm several days ago.  Recently she has developed worsening pain along with a fever of 104 decreased urinary output.  She denies a history of kidney stones.  She denies any cough chest pain difficulty breathing.  She is awaiting follow-up to Dr. Zimmerman.  She has never seen Dr. Zimmerman before but is awaiting an appointment.        Review of Systems    Past Medical History:   Diagnosis Date   • Back pain    • Bronchitis    • Hypothyroid    • Insomnia    • Lumbosacral disc disease Aug 2022       Allergies   Allergen Reactions   • Zofran [Ondansetron] Headache       Past Surgical History:   Procedure Laterality Date   • BACK SURGERY  2022    L5/S1 Diskectomy   • BILATERAL BREAST REDUCTION Bilateral 10/31/2019    Procedure: BREAST REDUCTION BILATERAL;  Surgeon: Francis Galvan MD;  Location:  [facility];  Service: Plastics   •  SECTION      x two   • COLONOSCOPY     • EPIDURAL BLOCK  22 and 22   • LUMBAR DISCECTOMY Right 2022    Procedure: LUMBAR DISCECTOMY L5-S1 RIGHT;  Surgeon: Yared Zapien MD;  Location:  [facility];  Service: Neurosurgery;  Laterality: Right;   • REDUCTION MAMMAPLASTY Bilateral            Family History   Problem Relation Age of Onset   • Cancer Mother         Lung/Brain   • Thyroid disease Mother    • Hypertension Father    • Heart disease Father    • Emphysema Father    • COPD Father    • Hyperlipidemia Father    • Cancer Maternal Grandmother         Lung   • Diabetes Son    • Breast cancer Neg Hx    • Ovarian cancer Neg Hx        Social History     Socioeconomic History   • Marital status:    Tobacco Use   • Smoking status: Never   • Smokeless tobacco: Never   Vaping Use   • Vaping Use: Never used   Substance and Sexual Activity   • Alcohol use: No   • Drug use: No   • Sexual activity: Yes     Partners: Male     Birth  control/protection: Surgical           Objective   Physical Exam  Constitutional:       Appearance: She is well-developed.   HENT:      Head: Normocephalic and atraumatic.      Right Ear: External ear normal.      Left Ear: External ear normal.      Nose: Nose normal.   Eyes:      Conjunctiva/sclera: Conjunctivae normal.      Pupils: Pupils are equal, round, and reactive to light.   Cardiovascular:      Rate and Rhythm: Normal rate and regular rhythm.      Heart sounds: Normal heart sounds.   Pulmonary:      Effort: Pulmonary effort is normal.      Breath sounds: Normal breath sounds.   Abdominal:      General: Bowel sounds are normal.      Palpations: Abdomen is soft.      Tenderness: There is abdominal tenderness.   Musculoskeletal:         General: Normal range of motion.      Cervical back: Normal range of motion and neck supple.   Skin:     General: Skin is warm and dry.   Neurological:      Mental Status: She is alert and oriented to person, place, and time.   Psychiatric:         Behavior: Behavior normal.         Thought Content: Thought content normal.         Judgment: Judgment normal.         Procedures           ED Course  ED Course as of 05/24/23 1022   Wed May 24, 2023   0957 Currently on hold with Urology [JM]   0958 I spoke with the office staff Dr. Pascual they will be calling me back [JM]   1003 Differential includes pyelonephritis kidney stone sepsis renal failure.  Awaiting urology callback.  Patient will need to be admitted. []      ED Course User Index  [JM] Austin Shah APRN            XR Abdomen KUB   Final Result   Impression:   5 mm radiopaque stone overlies the right sacrum. This is unchanged position compared to the CT study from 5/22/2023. It causes severe hydronephrosis on CT exam.   Moderate stool burden. Question constipation.         Electronically Signed: Alanna Hyatt     5/24/2023 8:10 AM EDT     Workstation ID: [id number]      CT Outside Films   Final Result                                           Medical Decision Making  Acute UTI: complicated acute illness or injury  Leukocytosis, unspecified type: complicated acute illness or injury  Sepsis, due to unspecified organism, unspecified whether acute organ dysfunction present: complicated acute illness or injury  Ureteral stone: complicated acute illness or injury  Amount and/or Complexity of Data Reviewed  External Data Reviewed: labs, radiology and notes.  Labs: ordered. Decision-making details documented in ED Course.  Radiology: ordered. Decision-making details documented in ED Course.  ECG/medicine tests:  Decision-making details documented in ED Course.      Risk  Prescription drug management.  Decision regarding hospitalization.          Final diagnoses:   Ureteral stone   Acute UTI   Sepsis, due to unspecified organism, unspecified whether acute organ dysfunction present   Leukocytosis, unspecified type       ED Disposition  ED Disposition     ED Disposition   Decision to Admit    Condition   --    Comment   --             No follow-up provider specified.       Medication List      No changes were made to your prescriptions during this visit.          Austin Shah, APRN  05/24/23 1022

## 2023-05-24 NOTE — H&P (VIEW-ONLY)
[facility]   HISTORY AND PHYSICAL    Patient Name: Gypsy Mann  : 1970  MRN: [id number]  Primary Care Physician:  Genaro Casey MD  Date of admission: 2023    Subjective   Subjective     Chief Complaint: Right flank pain    HPI:    Gypsy Mann is a 53 y.o. female who presented to [facility] ED for worsening right flank pain. She reports 1 week history of abdominal bloating, nausea/vomiting and decreased PO intake. She presented to [facility] ED on  night and CT AP wo revealed 5mm right ureteral calculus and right hydronephrosis. She was discharged home with plan for outpatient Urologic follow up with Dr. Zimmerman. She has not seen Dr. Zimmerman before. She reports developing worsening right flank pain as well as fever/chills, TMAX at home 104.0, prompting presentation to ED.     She denies prior history of nephrolithiasis. 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     Labs reviewed; Cr 0.75, WBC 13.07, UA with 21-30 wbc, 3-6 rbc, trace bacteria, negative nitrite. She has been afebrile since presentation to ED. She is voiding small amounts. She reports right flank pain. Her  is at the bedside.     Review of Systems   All systems were reviewed and negative except for: right flank pain, chills    Personal History     Past Medical History:   Diagnosis Date   • Back pain    • Bronchitis    • Hypothyroid    • Insomnia    • Lumbosacral disc disease Aug 2022       Past Surgical History:   Procedure Laterality Date   • BACK SURGERY  2022    L5/S1 Diskectomy   • BILATERAL BREAST REDUCTION Bilateral 10/31/2019    Procedure: BREAST REDUCTION BILATERAL;  Surgeon: Francis Galvan MD;  Location:  [facility];  Service: Plastics   •  SECTION      x two   • COLONOSCOPY     • EPIDURAL BLOCK  22 and 22   • LUMBAR DISCECTOMY Right 2022    Procedure: LUMBAR DISCECTOMY L5-S1 RIGHT;  Surgeon: Yared Zapien MD;  Location:  [facility];  Service:  Neurosurgery;  Laterality: Right;   • REDUCTION MAMMAPLASTY Bilateral     2020       Family History: family history includes COPD in her father; Cancer in her maternal grandmother and mother; Diabetes in her son; Emphysema in her father; Heart disease in her father; Hyperlipidemia in her father; Hypertension in her father; Thyroid disease in her mother. Otherwise pertinent FHx was reviewed and not pertinent to current issue.    Social History:  reports that she has never smoked. She has never used smokeless tobacco. She reports that she does not drink alcohol and does not use drugs.    Home Medications:  levothyroxine, multivitamin with minerals, ondansetron ODT, oxyCODONE-acetaminophen, and zolpidem      Allergies:  Allergies   Allergen Reactions   • Zofran [Ondansetron] Headache       Objective   Objective     Vitals:   Temp:  [98.6 °F (37 °C)] 98.6 °F (37 °C)  Heart Rate:  [71-86] 78  Resp:  [16] 16  BP: ()/(45-68) 112/67  Physical Exam    Constitutional: Awake in bed, alert, chills   Eyes: PERRLA, sclerae anicteric, no conjunctival injection   HENT: Normocephalic, atraumatic, mucous membranes moist   Neck: Supple, trachea midline   Respiratory:Equal chest rise, non-labored respirations    Cardiovascular: RRR   Gastrointestinal: Nausea   Musculoskeletal: No bilateral ankle edema, no clubbing or cyanosis to extremities   Genitourinary: Voiding small amounts, right flank pain   Psychiatric: Appropriate affect, cooperative   Neurologic: Oriented x 3, Cranial Nerves grossly intact, speech clear   Skin: No rashes     Result Review    Result Review:  I have personally reviewed the results from the time of this admission to 5/24/2023 10:46 EDT and agree with these findings:  [x]  Laboratory  []  Microbiology  [x]  Radiology  []  EKG/Telemetry   []  Cardiology/Vascular   []  Pathology  []  Old records  [x]  Other: Vital signs    Most notable findings include: 5mm right ureteral calculus with right hydronephrosis.      Assessment & Plan   Assessment / Plan     Brief Patient Summary:  Gypsy Mann is a 53 y.o. female who presented to [facility] ED for worsening right flank pain after being diagnosed with a 5mm right ureteral calculus on Sunday night at [facility] ED.     Active Hospital Problems:  Active Hospital Problems    Diagnosis    • **Ureteral stone      I discussed in detail the risks, benefits and alternatives to Cystoscopy, right retrograde pyelogram, right ureteral stent placement. We discussed risks of infection, bleeding and anesthesia. She is understanding and agreeable with plan of care.     Plan:  -Keep NPO for procedure.  -Consent.  -OR this afternoon with Dr. Pascual.  -Continue abx, urine culture pending.  D/w Dr. Pascual.  will follow, please call if any questions.     DVT prophylaxis:  No DVT prophylaxis order currently exists.    CODE STATUS:       Admission Status:  I believe this patient meets inpatient status.    Electronically signed by CASS Mishra, 05/24/23, 10:46 AM EDT.

## 2023-05-24 NOTE — CONSULTS
[facility]   HISTORY AND PHYSICAL    Patient Name: Gypsy Mann  : 1970  MRN: [id number]  Primary Care Physician:  Genaro Casey MD  Date of admission: 2023    Subjective   Subjective     Chief Complaint: Right flank pain    HPI:    Gypsy Mann is a 53 y.o. female who presented to [facility] ED for worsening right flank pain. She reports 1 week history of abdominal bloating, nausea/vomiting and decreased PO intake. She presented to [facility] ED on  night and CT AP wo revealed 5mm right ureteral calculus and right hydronephrosis. She was discharged home with plan for outpatient Urologic follow up with Dr. Zimmerman. She has not seen Dr. Zimmerman before. She reports developing worsening right flank pain as well as fever/chills, TMAX at home 104.0, prompting presentation to ED.     She denies prior history of nephrolithiasis. 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     Labs reviewed; Cr 0.75, WBC 13.07, UA with 21-30 wbc, 3-6 rbc, trace bacteria, negative nitrite. She has been afebrile since presentation to ED. She is voiding small amounts. She reports right flank pain. Her  is at the bedside.     Review of Systems   All systems were reviewed and negative except for: right flank pain, chills    Personal History     Past Medical History:   Diagnosis Date   • Back pain    • Bronchitis    • Hypothyroid    • Insomnia    • Lumbosacral disc disease Aug 2022       Past Surgical History:   Procedure Laterality Date   • BACK SURGERY  2022    L5/S1 Diskectomy   • BILATERAL BREAST REDUCTION Bilateral 10/31/2019    Procedure: BREAST REDUCTION BILATERAL;  Surgeon: Francis Galvan MD;  Location:  [facility];  Service: Plastics   •  SECTION      x two   • COLONOSCOPY     • EPIDURAL BLOCK  22 and 22   • LUMBAR DISCECTOMY Right 2022    Procedure: LUMBAR DISCECTOMY L5-S1 RIGHT;  Surgeon: Yared Zapien MD;  Location:  [facility];  Service:  Neurosurgery;  Laterality: Right;   • REDUCTION MAMMAPLASTY Bilateral     2020       Family History: family history includes COPD in her father; Cancer in her maternal grandmother and mother; Diabetes in her son; Emphysema in her father; Heart disease in her father; Hyperlipidemia in her father; Hypertension in her father; Thyroid disease in her mother. Otherwise pertinent FHx was reviewed and not pertinent to current issue.    Social History:  reports that she has never smoked. She has never used smokeless tobacco. She reports that she does not drink alcohol and does not use drugs.    Home Medications:  levothyroxine, multivitamin with minerals, ondansetron ODT, oxyCODONE-acetaminophen, and zolpidem      Allergies:  Allergies   Allergen Reactions   • Zofran [Ondansetron] Headache       Objective   Objective     Vitals:   Temp:  [98.6 °F (37 °C)] 98.6 °F (37 °C)  Heart Rate:  [71-86] 78  Resp:  [16] 16  BP: ()/(45-68) 112/67  Physical Exam    Constitutional: Awake in bed, alert, chills   Eyes: PERRLA, sclerae anicteric, no conjunctival injection   HENT: Normocephalic, atraumatic, mucous membranes moist   Neck: Supple, trachea midline   Respiratory:Equal chest rise, non-labored respirations    Cardiovascular: RRR   Gastrointestinal: Nausea   Musculoskeletal: No bilateral ankle edema, no clubbing or cyanosis to extremities   Genitourinary: Voiding small amounts, right flank pain   Psychiatric: Appropriate affect, cooperative   Neurologic: Oriented x 3, Cranial Nerves grossly intact, speech clear   Skin: No rashes     Result Review    Result Review:  I have personally reviewed the results from the time of this admission to 5/24/2023 10:46 EDT and agree with these findings:  [x]  Laboratory  []  Microbiology  [x]  Radiology  []  EKG/Telemetry   []  Cardiology/Vascular   []  Pathology  []  Old records  [x]  Other: Vital signs    Most notable findings include: 5mm right ureteral calculus with right hydronephrosis.      Assessment & Plan   Assessment / Plan     Brief Patient Summary:  Gypsy Mann is a 53 y.o. female who presented to [facility] ED for worsening right flank pain after being diagnosed with a 5mm right ureteral calculus on Sunday night at [facility] ED.     Active Hospital Problems:  Active Hospital Problems    Diagnosis    • **Ureteral stone      I discussed in detail the risks, benefits and alternatives to Cystoscopy, right retrograde pyelogram, right ureteral stent placement. We discussed risks of infection, bleeding and anesthesia. She is understanding and agreeable with plan of care.     Plan:  -Keep NPO for procedure.  -Consent.  -OR this afternoon with Dr. Pascual.  -Continue abx, urine culture pending.  D/w Dr. Pascual.  will follow, please call if any questions.     DVT prophylaxis:  No DVT prophylaxis order currently exists.    CODE STATUS:       Admission Status:  I believe this patient meets inpatient status.    Electronically signed by CASS Mishra, 05/24/23, 10:46 AM EDT.

## 2023-05-24 NOTE — H&P
[facility] Medicine Services  HISTORY AND PHYSICAL    Patient Name: Gypsy Mann  : 1970  MRN: [id number]  Primary Care Physician: Genaro Casey MD  Date of admission: 2023      Subjective   Subjective     Chief Complaint:  fever    HPI:  Gypsy Mann is a 53 y.o. female with history of hypothyroid and lumbar radiculopathy s/p discetomy presents with fever, nausea and abdominal pain.  Ms Mann says she had right lower quadrant pain and diarrhea over the weekend, and assumed she had caught a GI bug after recent travel to [place].  On , she was seen at the [place] ED -- CT scan showed right nephrolithiasis with hydronephrosis.  She was discharged home, however she developed worsening pain and fevers, as well as decreased urine output.  No prior history of nephrolithiasis      Review of Systems   Constitutional: Positive for fever.   HENT: Negative.    Eyes: Negative.    Respiratory: Negative.    Cardiovascular: Negative.    Gastrointestinal: Positive for abdominal pain and nausea.   Endocrine: Negative.    Genitourinary: Positive for flank pain.   Skin: Negative.    Allergic/Immunologic: Negative.    Neurological: Negative.    Hematological: Negative.    Psychiatric/Behavioral: Negative.           Personal History     Past Medical History:   Diagnosis Date   • Back pain    • Bronchitis    • Hypothyroid    • Insomnia    • Lumbosacral disc disease Aug 2022             Past Surgical History:   Procedure Laterality Date   • BACK SURGERY  2022    L5/S1 Diskectomy   • BILATERAL BREAST REDUCTION Bilateral 10/31/2019    Procedure: BREAST REDUCTION BILATERAL;  Surgeon: Francis Galvan MD;  Location: [facility] OR;  Service: Plastics   •  SECTION      x two   • COLONOSCOPY     • EPIDURAL BLOCK  22 and 22   • LUMBAR DISCECTOMY Right 2022    Procedure: LUMBAR DISCECTOMY L5-S1 RIGHT;  Surgeon: Yared Zapien MD;  Location: [facility] OR;   Service: Neurosurgery;  Laterality: Right;   • REDUCTION MAMMAPLASTY Bilateral     2020       Family History: family history includes COPD in her father; Cancer in her maternal grandmother and mother; Diabetes in her son; Emphysema in her father; Heart disease in her father; Hyperlipidemia in her father; Hypertension in her father; Thyroid disease in her mother.     Social History:  reports that she has never smoked. She has never used smokeless tobacco. She reports that she does not drink alcohol and does not use drugs.  Social History     Social History Narrative    Works at [employer]       Medications:  Available home medication information reviewed.  (Not in a hospital admission)      Allergies   Allergen Reactions   • Zofran [Ondansetron] Headache       Objective   Objective     Vital Signs:   Temp:  [98.6 °F (37 °C)] 98.6 °F (37 °C)  Heart Rate:  [71-86] 78  Resp:  [16] 16  BP: ()/(45-68) 112/63       Physical Exam   Constitutional - no acute distress, nontoxic, in bed  HEENT-NCAT, mucous membranes moist  CV-RRR  Resp-faint crackles at bases  Abd-soft, nontender to light palpation, nondistended, normoactive bowel sounds  Ext-No lower extremity cyanosis, clubbing or edema bilaterally  Neuro-alert and oriented, speech clear, moves all extremities   Psych-normal affect   Skin- No rash on exposed UE or LE bilaterally      Result Review:  I have personally reviewed the results from the time of this admission to 5/24/2023 11:38 EDT and agree with these findings:  [x]  Laboratory list / accordion  []  Microbiology  [x]  Radiology  []  EKG/Telemetry   []  Cardiology/Vascular   []  Pathology  []  Old records  []  Other:  Most notable findings include:         LAB RESULTS:      Lab 05/24/23  0751   WBC 13.07*   HEMOGLOBIN 10.1*   HEMATOCRIT 33.2*   PLATELETS 321   NEUTROS ABS 10.47*   IMMATURE GRANS (ABS) 0.10*   LYMPHS ABS 1.46   MONOS ABS 0.99*   EOS ABS 0.03   MCV 91.5   PROCALCITONIN 1.60*    LACTATE 0.8         Lab 05/24/23  0751   SODIUM 135*   POTASSIUM 3.5   CHLORIDE 100   CO2 23.0   ANION GAP 12.0   BUN 9   CREATININE 0.75   EGFR 94.7   GLUCOSE 104*   CALCIUM 9.1         Lab 05/24/23  0751   TOTAL PROTEIN 6.8   ALBUMIN 3.4*   GLOBULIN 3.4   ALT (SGPT) 13   AST (SGOT) 19   BILIRUBIN 0.3   ALK PHOS 123*   LIPASE 16                     UA        5/24/2023    07:47   Urinalysis   Squamous Epithelial Cells, UA 3-6     Specific Gravity, UA <=1.005     Ketones, UA Negative     Blood, UA Trace     Leukocytes, UA Large (3+)     Nitrite, UA Negative     RBC, UA 3-6     WBC, UA 21-30     Bacteria, UA Trace         Microbiology Results (last 10 days)     ** No results found for the last 240 hours. **          XR Abdomen KUB    Result Date: 5/24/2023  XR ABDOMEN KUB Date of Exam: 5/24/2023 7:56 AM EDT Indication: right flank pain. Renal stone seen on outside CT study right side. Comparison: CT abdomen pelvis [facility] 5/22/2023 Findings: Radiopaque stone is seen overlying the right sacrum measures about 5 mm this was seen in the same location on the recent CT study. Hydronephrosis not evaluated on radiograph. No other radiopaque renal calculi are seen on this exam. No abnormally dilated loops of bowel. Moderate stool burden. Lung bases are not completely included in field-of-view.     Impression: Impression: 5 mm radiopaque stone overlies the right sacrum. This is unchanged position compared to the CT study from 5/22/2023. It causes severe hydronephrosis on CT exam. Moderate stool burden. Question constipation. Electronically Signed: Alanna Hyatt  5/24/2023 8:10 AM EDT  Workstation ID: [id number]    CT Outside Films    Result Date: 5/24/2023  This procedure was auto-finalized with no dictation required.          Assessment & Plan   Assessment & Plan     Active Hospital Problems    Diagnosis  POA   • **Ureteral stone [N20.1]  Yes       Obstructive nephrolithiasis  Hydronephrosis  UTI  -  rocephin, follow cultures  - to OR for stent placement today with urology  - IV fluids  - pain and nausea control    Hypothyroid  - check TSH    Anemia  - hemoglobin 10.1  - check iron, b12, folate  - cbc am    DVT prophylaxis:  SCDs      CODE STATUS:  Full code  Code Status and Medical Interventions:   Ordered at: 05/24/23 1135     Level Of Support Discussed With:    Patient     Code Status (Patient has no pulse and is not breathing):    CPR (Attempt to Resuscitate)     Medical Interventions (Patient has pulse or is breathing):    Full Support       Expected Discharge   Expected Discharge Date: 5/26/2023; Expected Discharge Time:      Gilbert Shrestha MD  05/24/23

## 2023-05-24 NOTE — ANESTHESIA PROCEDURE NOTES
Airway  Urgency: elective    Date/Time: 5/24/2023 1:49 PM  Airway not difficult    General Information and Staff    Patient location during procedure: OR  CRNA/CAA: Dean Powell, CRNA    Indications and Patient Condition  Indications for airway management: airway protection    Preoxygenated: yes  Mask difficulty assessment: 1 - vent by mask    Final Airway Details  Final airway type: supraglottic airway      Successful airway: I-gel  Size 3     Number of attempts at approach: 1  Assessment: lips, teeth, and gum same as pre-op    Additional Comments  LMA placed without difficulty, ventilation with assist, equal breath sounds and symmetric chest rise and fall

## 2023-05-24 NOTE — BRIEF OP NOTE
CYSTOSCOPY URETEROSCOPY RETROGRADE PYELOGRAM STONE EXTRACTION STENT INSERTION  Progress Note    Gypsy Mann  5/24/2023    Pre-op Diagnosis:   Right ureteral stone       Post-Op Diagnosis Codes:   Right ureteral stone     Procedure(s):  CYSTOSCOPY   SEQUENTIAL URETHRAL DILATION   RIGHT RETROGRADE PYELOGRAM  RIGHT URETERAL STENT PLACEMENT      Surgeon(s):  Parag Pascual MD    Anesthesia: General    Staff:   Circulator: Amanda Gómez RN  Radiology Technologist: Lorri Levy RT  Scrub Person: Rosalie Stiles         Estimated Blood Loss: none    Urine Voided: * No values recorded between 5/24/2023  1:44 PM and 5/24/2023  2:08 PM *    Specimens:                None          Drains: * No LDAs found *    Findings: Narrow meatus/distal urethra, unable to advance cystoscope requiring sequential urethral dilation, right retrograde pyelogram demonstrates distal obstruction with mild hydronephrosis. Uncomplicated right ureteral stent placement 6 Fr x 24 cm stent.         Complications: None          Parag Pascual MD     Date: 5/24/2023  Time: 14:08 EDT

## 2023-05-25 ENCOUNTER — PREP FOR SURGERY (OUTPATIENT)
Dept: OTHER | Facility: HOSPITAL | Age: 53
End: 2023-05-25
Payer: COMMERCIAL

## 2023-05-25 ENCOUNTER — TELEPHONE (OUTPATIENT)
Dept: UROLOGY | Facility: CLINIC | Age: 53
End: 2023-05-25
Payer: COMMERCIAL

## 2023-05-25 VITALS
DIASTOLIC BLOOD PRESSURE: 68 MMHG | OXYGEN SATURATION: 93 % | HEART RATE: 54 BPM | BODY MASS INDEX: 24.98 KG/M2 | TEMPERATURE: 98.2 F | WEIGHT: 141 LBS | RESPIRATION RATE: 18 BRPM | HEIGHT: 63 IN | SYSTOLIC BLOOD PRESSURE: 110 MMHG

## 2023-05-25 DIAGNOSIS — N20.1 RIGHT URETERAL STONE: Primary | ICD-10-CM

## 2023-05-25 LAB
ANION GAP SERPL CALCULATED.3IONS-SCNC: 10 MMOL/L (ref 5–15)
BASOPHILS # BLD AUTO: 0 10*3/MM3 (ref 0–0.2)
BASOPHILS NFR BLD AUTO: 0 % (ref 0–1.5)
BUN SERPL-MCNC: 13 MG/DL (ref 6–20)
BUN/CREAT SERPL: 21.7 (ref 7–25)
CALCIUM SPEC-SCNC: 8.8 MG/DL (ref 8.6–10.5)
CHLORIDE SERPL-SCNC: 106 MMOL/L (ref 98–107)
CO2 SERPL-SCNC: 23 MMOL/L (ref 22–29)
CREAT SERPL-MCNC: 0.6 MG/DL (ref 0.57–1)
DEPRECATED RDW RBC AUTO: 43.5 FL (ref 37–54)
EGFRCR SERPLBLD CKD-EPI 2021: 106.8 ML/MIN/1.73
EOSINOPHIL # BLD AUTO: 0 10*3/MM3 (ref 0–0.4)
EOSINOPHIL NFR BLD AUTO: 0 % (ref 0.3–6.2)
ERYTHROCYTE [DISTWIDTH] IN BLOOD BY AUTOMATED COUNT: 13 % (ref 12.3–15.4)
FOLATE SERPL-MCNC: 19.2 NG/ML (ref 4.78–24.2)
GLUCOSE SERPL-MCNC: 142 MG/DL (ref 65–99)
HCT VFR BLD AUTO: 28.5 % (ref 34–46.6)
HGB BLD-MCNC: 8.7 G/DL (ref 12–15.9)
IMM GRANULOCYTES # BLD AUTO: 0.05 10*3/MM3 (ref 0–0.05)
IMM GRANULOCYTES NFR BLD AUTO: 0.7 % (ref 0–0.5)
LYMPHOCYTES # BLD AUTO: 1.02 10*3/MM3 (ref 0.7–3.1)
LYMPHOCYTES NFR BLD AUTO: 13.4 % (ref 19.6–45.3)
MCH RBC QN AUTO: 27.9 PG (ref 26.6–33)
MCHC RBC AUTO-ENTMCNC: 30.5 G/DL (ref 31.5–35.7)
MCV RBC AUTO: 91.3 FL (ref 79–97)
MONOCYTES # BLD AUTO: 0.47 10*3/MM3 (ref 0.1–0.9)
MONOCYTES NFR BLD AUTO: 6.2 % (ref 5–12)
NEUTROPHILS NFR BLD AUTO: 6.05 10*3/MM3 (ref 1.7–7)
NEUTROPHILS NFR BLD AUTO: 79.7 % (ref 42.7–76)
NRBC BLD AUTO-RTO: 0 /100 WBC (ref 0–0.2)
PLATELET # BLD AUTO: 266 10*3/MM3 (ref 140–450)
PMV BLD AUTO: 10.1 FL (ref 6–12)
POTASSIUM SERPL-SCNC: 4.2 MMOL/L (ref 3.5–5.2)
PROCALCITONIN SERPL-MCNC: 1 NG/ML (ref 0–0.25)
RBC # BLD AUTO: 3.12 10*6/MM3 (ref 3.77–5.28)
SODIUM SERPL-SCNC: 139 MMOL/L (ref 136–145)
VIT B12 BLD-MCNC: 796 PG/ML (ref 211–946)
WBC NRBC COR # BLD: 7.59 10*3/MM3 (ref 3.4–10.8)

## 2023-05-25 PROCEDURE — G0378 HOSPITAL OBSERVATION PER HR: HCPCS

## 2023-05-25 PROCEDURE — 85025 COMPLETE CBC W/AUTO DIFF WBC: CPT | Performed by: STUDENT IN AN ORGANIZED HEALTH CARE EDUCATION/TRAINING PROGRAM

## 2023-05-25 PROCEDURE — 82607 VITAMIN B-12: CPT | Performed by: INTERNAL MEDICINE

## 2023-05-25 PROCEDURE — 84145 PROCALCITONIN (PCT): CPT | Performed by: STUDENT IN AN ORGANIZED HEALTH CARE EDUCATION/TRAINING PROGRAM

## 2023-05-25 PROCEDURE — 82746 ASSAY OF FOLIC ACID SERUM: CPT | Performed by: INTERNAL MEDICINE

## 2023-05-25 PROCEDURE — 99238 HOSP IP/OBS DSCHRG MGMT 30/<: CPT | Performed by: INTERNAL MEDICINE

## 2023-05-25 PROCEDURE — 80048 BASIC METABOLIC PNL TOTAL CA: CPT | Performed by: STUDENT IN AN ORGANIZED HEALTH CARE EDUCATION/TRAINING PROGRAM

## 2023-05-25 PROCEDURE — 25010000002 CEFTRIAXONE PER 250 MG: Performed by: STUDENT IN AN ORGANIZED HEALTH CARE EDUCATION/TRAINING PROGRAM

## 2023-05-25 PROCEDURE — 99231 SBSQ HOSP IP/OBS SF/LOW 25: CPT | Performed by: NURSE PRACTITIONER

## 2023-05-25 RX ORDER — SULFAMETHOXAZOLE AND TRIMETHOPRIM 800; 160 MG/1; MG/1
1 TABLET ORAL 2 TIMES DAILY
Qty: 20 TABLET | Refills: 0 | Status: SHIPPED | OUTPATIENT
Start: 2023-05-26

## 2023-05-25 RX ORDER — PHENAZOPYRIDINE HYDROCHLORIDE 100 MG/1
100 TABLET, FILM COATED ORAL 3 TIMES DAILY PRN
Qty: 9 TABLET | Refills: 0 | Status: SHIPPED | OUTPATIENT
Start: 2023-05-25

## 2023-05-25 RX ORDER — TAMSULOSIN HYDROCHLORIDE 0.4 MG/1
0.4 CAPSULE ORAL DAILY
Qty: 21 CAPSULE | Refills: 0 | Status: CANCELLED | OUTPATIENT
Start: 2023-05-26

## 2023-05-25 RX ORDER — PHENAZOPYRIDINE HYDROCHLORIDE 100 MG/1
200 TABLET, FILM COATED ORAL 3 TIMES DAILY PRN
Status: DISCONTINUED | OUTPATIENT
Start: 2023-05-25 | End: 2023-05-25 | Stop reason: HOSPADM

## 2023-05-25 RX ORDER — OXYBUTYNIN CHLORIDE 5 MG/1
5 TABLET, EXTENDED RELEASE ORAL DAILY
Qty: 21 TABLET | Refills: 0 | Status: CANCELLED | OUTPATIENT
Start: 2023-05-26

## 2023-05-25 RX ORDER — CEFAZOLIN SODIUM 2 G/100ML
2 INJECTION, SOLUTION INTRAVENOUS ONCE
OUTPATIENT
Start: 2023-05-25 | End: 2023-05-25

## 2023-05-25 RX ADMIN — OXYBUTYNIN CHLORIDE 5 MG: 5 TABLET, EXTENDED RELEASE ORAL at 08:25

## 2023-05-25 RX ADMIN — SODIUM CHLORIDE 1 G: 900 INJECTION INTRAVENOUS at 08:25

## 2023-05-25 RX ADMIN — POLYETHYLENE GLYCOL 3350 17 G: 17 POWDER, FOR SOLUTION ORAL at 09:37

## 2023-05-25 RX ADMIN — ACETAMINOPHEN 650 MG: 325 TABLET ORAL at 03:34

## 2023-05-25 RX ADMIN — TAMSULOSIN HYDROCHLORIDE 0.4 MG: 0.4 CAPSULE ORAL at 08:25

## 2023-05-25 RX ADMIN — LEVOTHYROXINE SODIUM 75 MCG: 75 TABLET ORAL at 08:27

## 2023-05-25 RX ADMIN — SODIUM CHLORIDE, POTASSIUM CHLORIDE, SODIUM LACTATE AND CALCIUM CHLORIDE 100 ML/HR: 600; 310; 30; 20 INJECTION, SOLUTION INTRAVENOUS at 08:25

## 2023-05-25 RX ADMIN — DOCUSATE SODIUM 50 MG AND SENNOSIDES 8.6 MG 2 TABLET: 8.6; 5 TABLET, FILM COATED ORAL at 08:25

## 2023-05-25 NOTE — DISCHARGE SUMMARY
[facility] Medicine Services  DISCHARGE SUMMARY    Patient Name: Gypsy Mann  : 1970  MRN: [id number]    Date of Admission: 2023  7:20 AM  Date of Discharge: 2023  Primary Care Physician: Genaro Casey MD    Consults     No orders found for last 30 day(s).          Hospital Course     Presenting Problem: Abdominal pain    Active Hospital Problems    Diagnosis  POA   • **Ureteral stone [N20.1]  Yes      Resolved Hospital Problems   No resolved problems to display.          Hospital Course:  Gypsy Mann is a 53 y.o. female with hypothyroidism, lumbar radiculopathy s/p discectomy on narcotics, who presented to hospital with abdominal pain, fever, nausea.  Work-up demonstrated obstructing right-sided ureterolithiasis with hydronephrosis.  Urology was consulted, she was taken to the OR  by Dr. Pascual, the cystoscope was unable to be passed through her urethra, required sequential urethral dilation, ultimately had RT retrograde pyelogram with RT ureteral stent placement.  She is well this morning, with no further pain, urine cultures are pending, she is discharging on empiric oral Bactrim x10 days with outpatient follow-up with urology for repeat urine culture and definitive stone management.    Discharge Follow Up Recommendations for outpatient labs/diagnostics:  PCP in 1-2 weeks, post hospital follow-up  Urology, 1-2 weeks, per their documentation their office will arrange    Day of Discharge     HPI:   Feels well this morning, no acute complaints, pain is resolved.    Review of Systems  Gen- No fevers, chills  CV- No chest pain, palpitations  Resp- No cough, dyspnea  GI- No N/V/D, abd pain    Vital Signs:   Temp:  [97.3 °F (36.3 °C)-98.9 °F (37.2 °C)] 98.2 °F (36.8 °C)  Heart Rate:  [46-89] 54  Resp:  [15-18] 18  BP: (106-119)/(63-71) 110/68      Physical Exam:  Constitutional: Awake, alert, sitting up in bed no acute distress  HENT: NCAT, mucous membranes  moist  Respiratory: Clear to auscultation bilaterally, respiratory effort normal   Cardiovascular: RRR, no murmurs, rubs, or gallops  Gastrointestinal: Positive bowel sounds, soft, nontender, nondistended  Psychiatric: Appropriate affect, cooperative    Pertinent  and/or Most Recent Results     LAB RESULTS:      Lab 05/25/23  0514 05/24/23  0751   WBC 7.59 13.07*   HEMOGLOBIN 8.7* 10.1*   HEMATOCRIT 28.5* 33.2*   PLATELETS 266 321   NEUTROS ABS 6.05 10.47*   IMMATURE GRANS (ABS) 0.05 0.10*   LYMPHS ABS 1.02 1.46   MONOS ABS 0.47 0.99*   EOS ABS 0.00 0.03   MCV 91.3 91.5   PROCALCITONIN 1.00* 1.60*   LACTATE  --  0.8         Lab 05/25/23  0514 05/24/23  0751   SODIUM 139 135*   POTASSIUM 4.2 3.5   CHLORIDE 106 100   CO2 23.0 23.0   ANION GAP 10.0 12.0   BUN 13 9   CREATININE 0.60 0.75   EGFR 106.8 94.7   GLUCOSE 142* 104*   CALCIUM 8.8 9.1   TSH  --  2.890         Lab 05/24/23  0751   TOTAL PROTEIN 6.8   ALBUMIN 3.4*   GLOBULIN 3.4   ALT (SGPT) 13   AST (SGOT) 19   BILIRUBIN 0.3   ALK PHOS 123*   LIPASE 16                 Lab 05/25/23  0514 05/24/23  0751   IRON  --  7*   IRON SATURATION  --  3*   TIBC  --  229*   TRANSFERRIN  --  154*   FERRITIN  --  301.10*   FOLATE 19.20  --    VITAMIN B 12 796  --          Brief Urine Lab Results  (Last result in the past 365 days)      Color   Clarity   Blood   Leuk Est   Nitrite   Protein   CREAT   Urine HCG        05/24/23 0747               Negative       05/24/23 0747 Yellow   Clear   Trace   Large (3+)   Negative   Negative               Microbiology Results (last 10 days)     Procedure Component Value - Date/Time    Blood Culture - Blood, Hand, Right [[id number]]  (Normal) Collected: 05/24/23 1106    Lab Status: Preliminary result Specimen: Blood from Hand, Right Updated: 05/25/23 1145     Blood Culture No growth at 24 hours    Blood Culture - Blood, Arm, Right [[id number]]  (Normal) Collected: 05/24/23 1103    Lab Status: Preliminary result Specimen: Blood from Arm, Right  Updated: 05/25/23 1145     Blood Culture No growth at 24 hours    Urine Culture - Urine, Urine, Clean Catch [[id number]]  (Abnormal) Collected: 05/24/23 0747    Lab Status: Preliminary result Specimen: Urine, Clean Catch Updated: 05/25/23 0922     Urine Culture 25,000 CFU/mL Gram Negative Bacilli    Narrative:      Colonization of the urinary tract without infection is common. Treatment is discouraged unless the patient is symptomatic, pregnant, or undergoing an invasive urologic procedure.          FL C Arm During Surgery    Result Date: 5/25/2023  This procedure was auto-finalized with no dictation required.    XR Abdomen KUB    Result Date: 5/24/2023  XR ABDOMEN KUB Date of Exam: 5/24/2023 7:56 AM EDT Indication: right flank pain. Renal stone seen on outside CT study right side. Comparison: CT abdomen pelvis [facility] 5/22/2023 Findings: Radiopaque stone is seen overlying the right sacrum measures about 5 mm this was seen in the same location on the recent CT study. Hydronephrosis not evaluated on radiograph. No other radiopaque renal calculi are seen on this exam. No abnormally dilated loops of bowel. Moderate stool burden. Lung bases are not completely included in field-of-view.     Impression: 5 mm radiopaque stone overlies the right sacrum. This is unchanged position compared to the CT study from 5/22/2023. It causes severe hydronephrosis on CT exam. Moderate stool burden. Question constipation. Electronically Signed: Alanna Hyatt  5/24/2023 8:10 AM EDT  Workstation ID: [id number]    CT Outside Films    Result Date: 5/24/2023  This procedure was auto-finalized with no dictation required.                  Plan for Follow-up of Pending Labs/Results: Following up with urology  Pending Labs     Order Current Status    Blood Culture - Blood, Arm, Right Preliminary result    Blood Culture - Blood, Hand, Right Preliminary result    Urine Culture - Urine, Urine, Clean Catch Preliminary result         Discharge Details        Discharge Medications      New Medications      Instructions Start Date   phenazopyridine 100 MG tablet  Commonly known as: Pyridium   100 mg, Oral, 3 Times Daily PRN      sulfamethoxazole-trimethoprim 800-160 MG per tablet  Commonly known as: Bactrim DS   1 tablet, Oral, 2 Times Daily   Start Date: May 26, 2023        Continue These Medications      Instructions Start Date   levothyroxine 75 MCG tablet  Commonly known as: SYNTHROID, LEVOTHROID   75 mcg, Oral, Every Early Morning      multivitamin with minerals tablet tablet   1 tablet, Oral, Daily, OTC      oxyCODONE-acetaminophen 5-325 MG per tablet  Commonly known as: PERCOCET   1 tablet, Oral, Every 4 Hours PRN      zolpidem 10 MG tablet  Commonly known as: AMBIEN   10 mg, Oral, Nightly PRN             Allergies   Allergen Reactions   • Zofran [Ondansetron] Headache         Discharge Disposition:  Home or Self Care    Diet:  Hospital:No active diet order         CODE STATUS:    Code Status and Medical Interventions:   Ordered at: 05/24/23 1131     Level Of Support Discussed With:    Patient     Code Status (Patient has no pulse and is not breathing):    CPR (Attempt to Resuscitate)     Medical Interventions (Patient has pulse or is breathing):    Full Support       No future appointments.    Additional Instructions for the Follow-ups that You Need to Schedule     Discharge Follow-up with PCP   As directed       Currently Documented PCP:    Genaro Casey MD    PCP Phone Number:    [phone]     Follow Up Details: 1-2 weeks - post hospitalization follow up         Discharge Follow-up with Specified Provider: Dr. Pascual/urology 1-2 weeks (per urology note their office will call patient to arrange)   As directed      To: Dr. Pascual/urology 1-2 weeks (per urology note their office will call patient to arrange)    Follow Up Details: s/p ureteral stent placement                     Donnie Woodruff DO  05/25/23      Time  Spent on Discharge:  I spent  20  minutes on this discharge activity which included: face-to-face encounter with the patient, reviewing the data in the system, coordination of the care with the nursing staff as well as consultants, documentation, and entering orders.

## 2023-05-25 NOTE — PROGRESS NOTES
[facility]   Urology Progress Note    Patient Name: Gypsy Mann  : 1970  MRN: [id number]  Primary Care Physician:  Genaro Casey MD  Date of admission: 2023    Subjective   Subjective     Chief Complaint: Right flank pain    HPI:  Patient sitting on side of bed, eating breakfast. Reports right flank pain has resolved. Mild dysuria. No hematuria. Labs stable. Afebrile.     Review of Systems   All systems were reviewed and negative except for: Dysuria    Objective   Objective     Vitals:   Temp:  [97.3 °F (36.3 °C)-100.7 °F (38.2 °C)] 98.2 °F (36.8 °C)  Heart Rate:  [46-89] 46  Resp:  [13-18] 18  BP: (106-129)/(57-76) 110/68  Flow (L/min):  [2] 2  Physical Exam    Constitutional: Awake in bed, alert   Eyes: PERRLA, sclerae anicteric, no conjunctival injection   HENT: Normocephalic, atraumatic, mucous membranes moist   Neck: Supple, trachea midline   Respiratory: Equal chest rise, non-labored respirations    Cardiovascular: RRR   Gastrointestinal: Soft   Genitourinary: Mild dysuria, voiding   Musculoskeletal: No lower extremity edema bilaterally, no clubbing or cyanosis to extremities   Psychiatric: Appropriate affect, cooperative   Neurologic: Oriented x 3,  Cranial Nerves grossly intact, speech clear   Skin: No rashes     Result Review    Result Review:  I have personally reviewed the results from the time of this admission to 2023 08:40 EDT and agree with these findings:  [x]  Laboratory  [x]  Microbiology  []  Radiology  []  EKG/Telemetry   []  Cardiology/Vascular   []  Pathology  []  Old records  [x]  Other: Vital signs    Most notable findings include: 5-6mm right mid ureteral calculus and UTI.     Assessment & Plan   Assessment / Plan     Brief Patient Summary:  Gypsy Mann is a 53 y.o. female who presented to [facility] ED for worsening right flank pain after being diagnosed with a 5mm right ureteral calculus on  night at [facility] ED. She is now POD 1 s/p  Cystoscopy, Right ureteral stent placement with Dr. Pascual.     Active Hospital Problems:  Active Hospital Problems    Diagnosis    • **Ureteral stone        Plan:   -Continue abx, urine culture pending.  -Pyridium prn for dysuria.  -Plan for outpatient follow up in 1-2 weeks with Dr. Pascual to discuss definitive stone management. Our office will call to schedule.    will be available, please call if any questions.  D/w Dr. Pascual.        DVT prophylaxis:  Mechanical DVT prophylaxis orders are present.    CODE STATUS:   Level Of Support Discussed With: Patient  Code Status (Patient has no pulse and is not breathing): CPR (Attempt to Resuscitate)  Medical Interventions (Patient has pulse or is breathing): Full Support    Disposition:  I expect patient to discharge home within 24-48 hours.     Electronically signed by CASS Mishra, 05/25/23, 8:40 AM EDT.

## 2023-05-25 NOTE — TELEPHONE ENCOUNTER
PT called and wanted to see if her next procedure in office would be for a follow up or a in office cysto procedure. She is leaving for vacation on June 22nd, and would like to have next procedure before her 4 day trip. Please advise, thank you!

## 2023-05-25 NOTE — PLAN OF CARE
Goal Outcome Evaluation:  Plan of Care Reviewed With: patient   - VSS, room air  - Pain controlled  - Patient to be discharged     Progress: improving

## 2023-05-25 NOTE — OP NOTE
CYSTOSCOPY URETEROSCOPY RETROGRADE PYELOGRAM STONE EXTRACTION STENT INSERTION  Procedure Report    Patient Name:  Gypsy Mann  YOB: 1970    Date of Surgery:  5/24/2023     Indications: 53-year-old female admitted with right-sided abdominal pain, fevers to 104 Fahrenheit, cloudy urine and 5 to 6 mm right mid ureteral stone with hydronephrosis.  Discussed urgent need for right ureteral stent placement.    Pre-op Diagnosis:   Right ureteral stone       Post-Op Diagnosis Codes:  Right ureteral stone    Procedure(s):  CYSTOSCOPY  RIGHT RETROGRADE PYELOGRAM  RIGHT URETERAL STENT PLACEMENT    Staff:  Surgeon(s):  Parag Pascual MD         Anesthesia: General    Estimated Blood Loss: none    Implants:    Implant Name Type Inv. Item Serial No.  Lot No. LRB No. Used Action   STNT PERCUFLX NO GW 6X24 - [id number] Stent STNT PERCUFLX NO GW 6X24  [id number] Right 1 Implanted       Specimen:          None        Findings: Mid ureteral stone under fluoroscopy with hydronephrosis on retrograde pyelogram, uncomplicated 6 Anguillan by 24 cm double-J stent, no strings.    Complications: None    Description of Procedure:   The patient was identified in the preoperative holding area where informed consent was reviewed and signed. The patient was transported the operating room per anesthesia and placed supine on the operating table. Smooth endotracheal intubation was performed without issue after administration of general anesthesia. The patient was then placed in the dorsal lithotomy position where genitals were prepped and draped in the usual sterile fashion. A brief timeout was performed identifying the correct patient procedure and laterality. Perioperative antibiotics were administered. All pressure points were padded.     Procedure began by inserting a 22 Anguillan cystoscope atraumatically per the patient's urethra, entering into the bladder were pan cystoscopy was performed  identifying bilateral orthotopic ureteral orifices and no evidence of bladder masses or other lesions.  There was evidence of cystitis cystica along the posterior bladder wall and some mild erythema along the trigone consistent with her known urinary tract infection.     Right retrograde pyelogram interpretation    Attention was then turned to the right ureteral orifice. A 5 Fr open ended ureteral catheter was inserted into the distal ureter and contrast dye was injected up the ureter, a retrograde pyelogram was performed identifying a filling defect in the mid ureter consistent with location of stone on CT scan, and the ureter and renal pelvis locations were identified under fluoroscopy.  There was mild to moderate hydronephrosis on the right side.    A Sensor wire was inserted through the working channel of the scope and advanced up the right ureteral orifice to the level of the renal pelvis on fluoroscopy. A 6 x 24 Slovak double J ureteral stent was then advanced over the wire to the renal pelvis confirmed on fluoroscopy. The Sensor wire was then removed and a good proximal stent curl was visualized within the collecting system on fluoroscopy, and a distal stent curl was observed within the bladder on direct visualization. The patient's bladder was emptied. The cystoscope was then removed.    The patient was awoken from general anesthesia and transported to the PACU in stable condition.    PLAN  -Follow-up in 1 to 2 weeks for repeat urine culture in clinic and discussion of operative timing for definitive right ureteroscopy and lithotripsy at a later date once infection cleared            Parag Pascual MD     Date: 5/25/2023  Time: 07:41 EDT

## 2023-05-25 NOTE — TELEPHONE ENCOUNTER
I called the patient to let her know that her next appointment on the 6 is for surgery not a f/u.  She said that Dr. Pascual had called her and talked with her about.

## 2023-05-25 NOTE — CASE MANAGEMENT/SOCIAL WORK
Continued Stay Note  [facility]     Patient Name: Gypsy Mann  MRN: [id number]  Today's Date: 5/25/2023    Admit Date: 5/24/2023        Discharge Plan     Row Name 05/25/23 1148       Plan    Final Discharge Disposition Code 01 - home or self-care               Discharge Codes    No documentation.               Expected Discharge Date and Time     Expected Discharge Date Expected Discharge Time    May 25, 2023             Linda Aragon RN

## 2023-05-26 LAB
BACTERIA SPEC AEROBE CULT: ABNORMAL
BACTERIA SPEC AEROBE CULT: ABNORMAL

## 2023-05-29 LAB
BACTERIA SPEC AEROBE CULT: NORMAL
BACTERIA SPEC AEROBE CULT: NORMAL

## 2023-05-31 RX ORDER — PNV NO.95/FERROUS FUM/FOLIC AC 28MG-0.8MG
325 TABLET ORAL
COMMUNITY

## 2023-06-05 ENCOUNTER — APPOINTMENT (OUTPATIENT)
Dept: GENERAL RADIOLOGY | Facility: HOSPITAL | Age: 53
End: 2023-06-05
Payer: COMMERCIAL

## 2023-06-05 ENCOUNTER — ANESTHESIA (OUTPATIENT)
Dept: PERIOP | Facility: HOSPITAL | Age: 53
End: 2023-06-05
Payer: COMMERCIAL

## 2023-06-05 ENCOUNTER — ANESTHESIA EVENT (OUTPATIENT)
Dept: PERIOP | Facility: HOSPITAL | Age: 53
End: 2023-06-05
Payer: COMMERCIAL

## 2023-06-05 ENCOUNTER — HOSPITAL ENCOUNTER (OUTPATIENT)
Facility: HOSPITAL | Age: 53
Setting detail: HOSPITAL OUTPATIENT SURGERY
Discharge: HOME | End: 2023-06-05
Attending: STUDENT IN AN ORGANIZED HEALTH CARE EDUCATION/TRAINING PROGRAM | Admitting: STUDENT IN AN ORGANIZED HEALTH CARE EDUCATION/TRAINING PROGRAM
Payer: COMMERCIAL

## 2023-06-05 VITALS
TEMPERATURE: 98.2 F | HEART RATE: 66 BPM | DIASTOLIC BLOOD PRESSURE: 69 MMHG | HEIGHT: 63 IN | BODY MASS INDEX: 24.98 KG/M2 | SYSTOLIC BLOOD PRESSURE: 112 MMHG | OXYGEN SATURATION: 100 % | RESPIRATION RATE: 16 BRPM | WEIGHT: 141 LBS

## 2023-06-05 DIAGNOSIS — N20.0 RIGHT NEPHROLITHIASIS: Primary | ICD-10-CM

## 2023-06-05 DIAGNOSIS — N20.1 URETERAL STONE: Primary | ICD-10-CM

## 2023-06-05 DIAGNOSIS — N20.1 RIGHT URETERAL STONE: ICD-10-CM

## 2023-06-05 LAB
B-HCG UR QL: NEGATIVE
EXPIRATION DATE: NORMAL
INTERNAL NEGATIVE CONTROL: NORMAL
INTERNAL POSITIVE CONTROL: NORMAL
Lab: NORMAL

## 2023-06-05 PROCEDURE — 25010000002 FENTANYL CITRATE (PF) 100 MCG/2ML SOLUTION: Performed by: NURSE ANESTHETIST, CERTIFIED REGISTERED

## 2023-06-05 PROCEDURE — 74420 UROGRAPHY RTRGR +-KUB: CPT | Performed by: STUDENT IN AN ORGANIZED HEALTH CARE EDUCATION/TRAINING PROGRAM

## 2023-06-05 PROCEDURE — 76000 FLUOROSCOPY <1 HR PHYS/QHP: CPT

## 2023-06-05 PROCEDURE — 99024 POSTOP FOLLOW-UP VISIT: CPT | Performed by: STUDENT IN AN ORGANIZED HEALTH CARE EDUCATION/TRAINING PROGRAM

## 2023-06-05 PROCEDURE — 25010000002 DEXAMETHASONE PER 1 MG: Performed by: NURSE ANESTHETIST, CERTIFIED REGISTERED

## 2023-06-05 PROCEDURE — 25010000002 PROPOFOL 10 MG/ML EMULSION: Performed by: NURSE ANESTHETIST, CERTIFIED REGISTERED

## 2023-06-05 PROCEDURE — C1769 GUIDE WIRE: HCPCS | Performed by: STUDENT IN AN ORGANIZED HEALTH CARE EDUCATION/TRAINING PROGRAM

## 2023-06-05 PROCEDURE — 25010000002 CEFAZOLIN IN DEXTROSE 2-4 GM/100ML-% SOLUTION: Performed by: STUDENT IN AN ORGANIZED HEALTH CARE EDUCATION/TRAINING PROGRAM

## 2023-06-05 PROCEDURE — 82365 CALCULUS SPECTROSCOPY: CPT | Performed by: STUDENT IN AN ORGANIZED HEALTH CARE EDUCATION/TRAINING PROGRAM

## 2023-06-05 PROCEDURE — C1758 CATHETER, URETERAL: HCPCS | Performed by: STUDENT IN AN ORGANIZED HEALTH CARE EDUCATION/TRAINING PROGRAM

## 2023-06-05 PROCEDURE — 52356 CYSTO/URETERO W/LITHOTRIPSY: CPT | Performed by: STUDENT IN AN ORGANIZED HEALTH CARE EDUCATION/TRAINING PROGRAM

## 2023-06-05 PROCEDURE — 25510000001 IOPAMIDOL 61 % SOLUTION: Performed by: STUDENT IN AN ORGANIZED HEALTH CARE EDUCATION/TRAINING PROGRAM

## 2023-06-05 PROCEDURE — 81025 URINE PREGNANCY TEST: CPT | Performed by: STUDENT IN AN ORGANIZED HEALTH CARE EDUCATION/TRAINING PROGRAM

## 2023-06-05 PROCEDURE — C2617 STENT, NON-COR, TEM W/O DEL: HCPCS | Performed by: STUDENT IN AN ORGANIZED HEALTH CARE EDUCATION/TRAINING PROGRAM

## 2023-06-05 DEVICE — URETERAL STENT
Type: IMPLANTABLE DEVICE | Site: URETER | Status: FUNCTIONAL
Brand: PERCUFLEX™ PLUS

## 2023-06-05 RX ORDER — LIDOCAINE HYDROCHLORIDE 10 MG/ML
0.5 INJECTION, SOLUTION EPIDURAL; INFILTRATION; INTRACAUDAL; PERINEURAL ONCE AS NEEDED
Status: COMPLETED | OUTPATIENT
Start: 2023-06-05 | End: 2023-06-05

## 2023-06-05 RX ORDER — SODIUM CHLORIDE, SODIUM LACTATE, POTASSIUM CHLORIDE, CALCIUM CHLORIDE 600; 310; 30; 20 MG/100ML; MG/100ML; MG/100ML; MG/100ML
INJECTION, SOLUTION INTRAVENOUS CONTINUOUS PRN
Status: DISCONTINUED | OUTPATIENT
Start: 2023-06-05 | End: 2023-06-05 | Stop reason: SURG

## 2023-06-05 RX ORDER — PROPOFOL 10 MG/ML
VIAL (ML) INTRAVENOUS AS NEEDED
Status: DISCONTINUED | OUTPATIENT
Start: 2023-06-05 | End: 2023-06-05 | Stop reason: SURG

## 2023-06-05 RX ORDER — HYOSCYAMINE SULFATE 0.12 MG/1
0.12 TABLET SUBLINGUAL EVERY 4 HOURS PRN
Qty: 30 EACH | Refills: 0 | Status: SHIPPED | OUTPATIENT
Start: 2023-06-05

## 2023-06-05 RX ORDER — FENTANYL CITRATE 50 UG/ML
INJECTION, SOLUTION INTRAMUSCULAR; INTRAVENOUS AS NEEDED
Status: DISCONTINUED | OUTPATIENT
Start: 2023-06-05 | End: 2023-06-05 | Stop reason: SURG

## 2023-06-05 RX ORDER — FAMOTIDINE 10 MG/ML
20 INJECTION, SOLUTION INTRAVENOUS ONCE
Status: CANCELLED | OUTPATIENT
Start: 2023-06-05 | End: 2023-06-05

## 2023-06-05 RX ORDER — SODIUM CHLORIDE 0.9 % (FLUSH) 0.9 %
10 SYRINGE (ML) INJECTION AS NEEDED
Status: CANCELLED | OUTPATIENT
Start: 2023-06-05

## 2023-06-05 RX ORDER — PROCHLORPERAZINE EDISYLATE 5 MG/ML
10 INJECTION INTRAMUSCULAR; INTRAVENOUS ONCE AS NEEDED
Status: DISCONTINUED | OUTPATIENT
Start: 2023-06-05 | End: 2023-06-05 | Stop reason: HOSPADM

## 2023-06-05 RX ORDER — SODIUM CHLORIDE 0.9 % (FLUSH) 0.9 %
10 SYRINGE (ML) INJECTION EVERY 12 HOURS SCHEDULED
Status: CANCELLED | OUTPATIENT
Start: 2023-06-05

## 2023-06-05 RX ORDER — LIDOCAINE HYDROCHLORIDE 10 MG/ML
INJECTION, SOLUTION EPIDURAL; INFILTRATION; INTRACAUDAL; PERINEURAL AS NEEDED
Status: DISCONTINUED | OUTPATIENT
Start: 2023-06-05 | End: 2023-06-05 | Stop reason: SURG

## 2023-06-05 RX ORDER — HYDROCODONE BITARTRATE AND ACETAMINOPHEN 5; 325 MG/1; MG/1
1 TABLET ORAL EVERY 6 HOURS PRN
Qty: 8 TABLET | Refills: 0 | Status: SHIPPED | OUTPATIENT
Start: 2023-06-05

## 2023-06-05 RX ORDER — FENTANYL CITRATE 50 UG/ML
50 INJECTION, SOLUTION INTRAMUSCULAR; INTRAVENOUS
Status: DISCONTINUED | OUTPATIENT
Start: 2023-06-05 | End: 2023-06-05 | Stop reason: HOSPADM

## 2023-06-05 RX ORDER — LIDOCAINE HYDROCHLORIDE 20 MG/ML
JELLY TOPICAL AS NEEDED
Status: DISCONTINUED | OUTPATIENT
Start: 2023-06-05 | End: 2023-06-05 | Stop reason: HOSPADM

## 2023-06-05 RX ORDER — DEXAMETHASONE SODIUM PHOSPHATE 4 MG/ML
INJECTION, SOLUTION INTRA-ARTICULAR; INTRALESIONAL; INTRAMUSCULAR; INTRAVENOUS; SOFT TISSUE AS NEEDED
Status: DISCONTINUED | OUTPATIENT
Start: 2023-06-05 | End: 2023-06-05 | Stop reason: SURG

## 2023-06-05 RX ORDER — FAMOTIDINE 20 MG/1
20 TABLET, FILM COATED ORAL ONCE
Status: COMPLETED | OUTPATIENT
Start: 2023-06-05 | End: 2023-06-05

## 2023-06-05 RX ORDER — MIDAZOLAM HYDROCHLORIDE 1 MG/ML
1 INJECTION INTRAMUSCULAR; INTRAVENOUS
Status: DISCONTINUED | OUTPATIENT
Start: 2023-06-05 | End: 2023-06-05 | Stop reason: HOSPADM

## 2023-06-05 RX ORDER — SODIUM CHLORIDE, SODIUM LACTATE, POTASSIUM CHLORIDE, CALCIUM CHLORIDE 600; 310; 30; 20 MG/100ML; MG/100ML; MG/100ML; MG/100ML
9 INJECTION, SOLUTION INTRAVENOUS CONTINUOUS
Status: DISCONTINUED | OUTPATIENT
Start: 2023-06-05 | End: 2023-06-05 | Stop reason: HOSPADM

## 2023-06-05 RX ORDER — NITROFURANTOIN 25; 75 MG/1; MG/1
100 CAPSULE ORAL 2 TIMES DAILY
Qty: 6 CAPSULE | Refills: 0 | Status: SHIPPED | OUTPATIENT
Start: 2023-06-05 | End: 2023-06-10

## 2023-06-05 RX ORDER — PHENYLEPHRINE HCL IN 0.9% NACL 1 MG/10 ML
SYRINGE (ML) INTRAVENOUS AS NEEDED
Status: DISCONTINUED | OUTPATIENT
Start: 2023-06-05 | End: 2023-06-05 | Stop reason: SURG

## 2023-06-05 RX ORDER — MAGNESIUM HYDROXIDE 1200 MG/15ML
LIQUID ORAL AS NEEDED
Status: DISCONTINUED | OUTPATIENT
Start: 2023-06-05 | End: 2023-06-05 | Stop reason: HOSPADM

## 2023-06-05 RX ORDER — CEFAZOLIN SODIUM 2 G/100ML
2 INJECTION, SOLUTION INTRAVENOUS ONCE
Status: COMPLETED | OUTPATIENT
Start: 2023-06-05 | End: 2023-06-05

## 2023-06-05 RX ORDER — SODIUM CHLORIDE 9 MG/ML
40 INJECTION, SOLUTION INTRAVENOUS AS NEEDED
Status: CANCELLED | OUTPATIENT
Start: 2023-06-05

## 2023-06-05 RX ADMIN — DEXAMETHASONE SODIUM PHOSPHATE 4 MG: 4 INJECTION, SOLUTION INTRAMUSCULAR; INTRAVENOUS at 13:18

## 2023-06-05 RX ADMIN — FENTANYL CITRATE 100 MCG: 50 INJECTION, SOLUTION INTRAMUSCULAR; INTRAVENOUS at 13:05

## 2023-06-05 RX ADMIN — FAMOTIDINE 20 MG: 20 TABLET ORAL at 11:22

## 2023-06-05 RX ADMIN — SODIUM CHLORIDE, POTASSIUM CHLORIDE, SODIUM LACTATE AND CALCIUM CHLORIDE: 600; 310; 30; 20 INJECTION, SOLUTION INTRAVENOUS at 13:05

## 2023-06-05 RX ADMIN — LIDOCAINE HYDROCHLORIDE 50 MG: 10 INJECTION, SOLUTION EPIDURAL; INFILTRATION; INTRACAUDAL; PERINEURAL at 13:10

## 2023-06-05 RX ADMIN — SODIUM CHLORIDE, POTASSIUM CHLORIDE, SODIUM LACTATE AND CALCIUM CHLORIDE 9 ML/HR: 600; 310; 30; 20 INJECTION, SOLUTION INTRAVENOUS at 11:22

## 2023-06-05 RX ADMIN — PROPOFOL 200 MG: 10 INJECTION, EMULSION INTRAVENOUS at 13:10

## 2023-06-05 RX ADMIN — Medication 100 MCG: at 13:29

## 2023-06-05 RX ADMIN — CEFAZOLIN SODIUM 2 G: 2 INJECTION, SOLUTION INTRAVENOUS at 13:05

## 2023-06-05 RX ADMIN — Medication 100 MCG: at 13:19

## 2023-06-05 RX ADMIN — LIDOCAINE HYDROCHLORIDE 0.5 ML: 10 INJECTION, SOLUTION EPIDURAL; INFILTRATION; INTRACAUDAL; PERINEURAL at 11:22

## 2023-06-05 NOTE — OP NOTE
URETEROSCOPY LASER LITHOTRIPSY WITH STENT INSERTION  Procedure Report    Patient Name:  Gypsy Mann  YOB: 1970    Date of Surgery:  6/5/2023     Indications: 53-year-old female with right ureteral stone status post recent right ureteral stent placement and UTI which has been treated with antibiotic therapy now returns for definitive right ureteroscopy and laser lithotripsy.  Risk benefits and alternatives discussed.    Pre-op Diagnosis:   Right ureteral stone [N20.1]       Post-Op Diagnosis Codes:     * Right ureteral stone [N20.1]        Procedure(s):  CYSTOSCOPY  RIGHT URETEROSCOPY LASER LITHOTRIPSY   RIGHT RETROGRADE PYELOGRAM  RIGHT URETERAL STENT EXCHANGE    Staff:  Surgeon(s):  Parag Pascual MD         Anesthesia: General    Estimated Blood Loss: minimal    Implants:    Implant Name Type Inv. Item Serial No.  Lot No. LRB No. Used Action   STNT PERCUFLX NO GW 4.8X24 - [id number] Stent STNT PERCUFLX NO GW 4.8X24  [employer] [id number] Right 1 Implanted       Specimen:        Right ureteral stone for analysis          Findings: Obstructing distal 5 to 6 mm distal ureteral stone with significant impaction and edema at the distal ureter, stone successfully lasered and removed.  Stone fragment sent for analysis.  4.8 Mauritian by 24 cm double-J stent on the right with strings.    Complications: None    Description of Procedure:     After informed consent, the patient was brought back to the operating suite and moved over to the operating table. General anesthesia was smoothly induced, IV antibiotics were administered, and the patient was placed in the dorsal lithotomy position with careful attention focused on padding all pressure points. The patient was prepped and draped in standard fashion. A timeout was performed to ensure the correct patient and procedure    A 22Fr cystoscope was used to cannulate the urethra. The urethra was of normal course and caliber.  Upon  entering the bladder, pan-cystoscopy revealed no bladder abnormalities.  A right ureteral stent was emanating from the right ureteral orifice.  This was grasped and pulled back.    The bilateral ureteral orifices were visualized in their orthotopic positions. Attention was then turned to the right ureteral orifice which was cannulated with a 5 Fr open ended ureteral catheter. A retrograde pyelogram was performed demonstrating complete obstruction of the distal ureter at the level of the iliac vasculature.  Minimal contrast was seen proximal to this area of obstruction consistent with location of her stone.    A sensor wire was advanced up the right ureter and into the collecting system on fluoroscopy to serve as a safety wire which was clamped to the surgical drapes.       The semi-rigid ureteroscope was then advanced into the bladder. The right ureter was cannulated demonstrating obstructing 5 to 6 mm distal right ureteral stone at the level of the iliac vasculature with significant impaction and moderate edema.  200 µm thulium laser fiber was advanced through the ureteroscope and the stone was fragmented into multiple small pieces.  The stone fragments were small enough to irrigate out of the ureter into the bladder.  The stones were irrigated through the scope and sent for analysis.  The ureteroscope was readvanced into the proximal ureter and no additional stone fragments were identified.  Ureteral injuries were identified.    Using fluoroscopic guidance, a ureteral stent was then placed. A 4.8 F x 24 cm double J ureteral stent was advanced up the right ureter over our safety wire. Fluoroscopy confirmed good curl in both the kidney and the bladder. The bladder was drained, and this concluded our procedure.    Stent strings were then externalized and taped to the patient's left labia with mastisol and tegaderm.     The patient was brought back to the PACU in stable condition. All scopes and instruments were in  good working order at the end of the case. There were no complications.      Disposition:  Remove stent on strings on Wednesday, 6-7-2023, follow-up in 8 to 10 weeks with renal ultrasound prior.          Parag Pascual MD     Date: 6/5/2023  Time: 18:00 EDT

## 2023-06-05 NOTE — ANESTHESIA PROCEDURE NOTES
Airway  Urgency: elective    Date/Time: 6/5/2023 1:11 PM  Airway not difficult    General Information and Staff    Patient location during procedure: OR  CRNA/CAA: Deyanira Kuo CRNA    Indications and Patient Condition  Indications for airway management: airway protection    Preoxygenated: yes  Mask difficulty assessment: 1 - vent by mask    Final Airway Details  Final airway type: supraglottic airway      Successful airway: I-gel  Size 3     Number of attempts at approach: 1  Assessment: lips, teeth, and gum same as pre-op and atraumatic intubation    Additional Comments  LMA placed without difficulty, ventilation with assist, equal breath sounds and symmetric chest rise and fall

## 2023-06-05 NOTE — ANESTHESIA POSTPROCEDURE EVALUATION
Patient: Gypsy Mann    Procedure Summary       Date: 06/05/23 Room / Location:  [facility] / [facility]    Anesthesia Start: 1305 Anesthesia Stop:     Procedure: URETEROSCOPY LASER LITHOTRIPSY WITH STENT INSERTION - RIGHT (Right) Diagnosis:       Right ureteral stone      (Right ureteral stone [N20.1])    Surgeons: Parag Pascual MD Provider: Farhad Casillas MD    Anesthesia Type: general ASA Status: 2            Anesthesia Type: general    Vitals  Vitals Value Taken Time   BP     Temp     Pulse 65 06/05/23 1340   Resp     SpO2 94 % 06/05/23 1340   Vitals shown include unvalidated device data.        Post Anesthesia Care and Evaluation    Patient location during evaluation: PACU  Patient participation: waiting for patient participation  Pain management: adequate    Airway patency: patent  Anesthetic complications: No anesthetic complications  PONV Status: none  Cardiovascular status: hemodynamically stable and acceptable  Respiratory status: nonlabored ventilation, acceptable, nasal cannula and oral airway  Hydration status: acceptable

## 2023-06-05 NOTE — DISCHARGE INSTRUCTIONS
Ureteroscopy + Laser Lithotripsy Post-Operative Care/Expectations    Follow these guidelines after your procedure in order to assist with your recovery.    Anesthesia Precautions and Expectations  - Rest for 24 hours after receiving general anesthesia, make sure you have someone at home with you that can monitor you  - Do not operate a vehicle, drink alcohol, or make 'important decisions'/sign legal documentation during the immediate recovery period if you received sedation for your procedure  - You may experience a sore throat, jaw discomfort, or muscle aches related to anesthesia, these symptoms may last a few days    Activity  - You may resume your normal home activities immediately post-operatively; however, light activity is encouraged for 24 hours to prevent urinary bleeding  - Do not operate a vehicle or drink alcohol if you were prescribed narcotic pain medications     Bathing/Showering  - You may resume normal bathing and showering post-procedure    Pain/Urinary Symptoms  - You may experience burning urinary pain for a few days, and/or increased urinary urgency/frequency post-procedure for a few weeks which is expected (sometimes longer if a ureteral stent was left in place)   - A medication to prevent burning urinary pain (Phenazopyridine) may be prescribed by your doctor, take as directed  - A medication to prevent urinary urgency/frequency (sometimes referred to as “bladder spasms”) (Hyoscyamine, Oxybutynin, Mirabegron, Solifenacin, etc.) may be prescribed by your doctor for up to 1 month, take as directed     Urinary Bleeding (Hematuria)   - Some degree of light urinary bleeding (hematuria) is expected for up to 1-2 weeks (this may be as light as pink lemonade or somewhat darker like clear/pale red Gatorade); a good rule of thumb is that your urine should remain see-through    - If you experience heavy urinary bleeding (like the color and consistency of tomato juice, or red wine), large blood clots, or  "you are unable to urinate for more than 8 hours you should contact your doctor and present to the nearest Emergency Department  - Drink plenty of water at home and stay hydrated, as this will help naturally flush out your bladder and urethra    Antibiotics  - Complete the antibiotic course (if) prescribed as directed to prevent urinary infection     When to call your doctor:   - Pain that is not controlled with oral medications  - Signs of significant infection: Fever 101F, shaking chills, profuse sweating, persistent nausea or vomiting, unable to tolerate food or drink   - Severe urinary bleeding or large blood clots in urine  - Inability to urinate for more than 8 hours post-surgery         STENT REMOVAL INSTRUCTIONS    A ureteral stent was left in place after your procedure, the ureteral stent is a flexible plastic tube roughly 9 to 10 inches in length which allows urine to drain from the kidney to the bladder while the inflammation in the ureter is settling down after surgery.  Without the stent in place, the ureter could be blocked due to this ureteral inflammation which could result in severe flank pain.    The ureteral stent is attached to black strings which are externalized at your urethra (\"pee tube\") and are taped to your inner thigh or suprapubic region.    To remove the ureteral stent, remove the overlying tape, grasp the black strings, pull gently but also firmly until the entire stent has been removed.  This should slide out easily.  The stent is roughly 9 to 10 inches in length and usually blue in color.    If you go home and you notice that your stent has accidentally been pulled out (either partially or completely) this is okay. If the stent is partially removed, go ahead and remove the stent entirely.  Contact your urologist's office to notify your urologist regarding the incidental stent removal.    You may notice some urinary bleeding and some residual flank pain after the stent is removed, this " is entirely normal and can last for a few days.  Drink plenty of fluid to flush out any bleeding, and take Tylenol or ibuprofen for residual flank pain.      Your physician has asked you to remove your stent on: 6/7/23

## 2023-06-05 NOTE — ANESTHESIA PREPROCEDURE EVALUATION
Anesthesia Evaluation     Patient summary reviewed and Nursing notes reviewed   no history of anesthetic complications:   NPO Solid Status: > 8 hours  NPO Liquid Status: > 8 hours           Airway   Mallampati: II  TM distance: >3 FB  Neck ROM: full  No difficulty expected  Dental      Pulmonary - negative pulmonary ROS and normal exam   Cardiovascular - negative cardio ROS and normal exam        Neuro/Psych  (+) numbness  GI/Hepatic/Renal/Endo    (+) renal disease, thyroid problem     Musculoskeletal     (+) back pain  Abdominal    Substance History      OB/GYN          Other                      Anesthesia Plan    ASA 2     general     intravenous induction     Anesthetic plan, risks, benefits, and alternatives have been provided, discussed and informed consent has been obtained with: patient.    Plan discussed with CRNA.    CODE STATUS:

## 2023-06-05 NOTE — INTERVAL H&P NOTE
"[facility] Pre-op    Full history and physical note from office is attached.    VS: /57  HR 70  RR 16  T 98.6  sat 99%RA  Ht 160 cm (63\")   Wt 64 kg (141 lb)   BMI 24.98 kg/m²       LAB Results:  Lab Results   Component Value Date    WBC 7.59 05/25/2023    HGB 8.7 (L) 05/25/2023    HCT 28.5 (L) 05/25/2023    MCV 91.3 05/25/2023     05/25/2023    NEUTROABS 6.05 05/25/2023    GLUCOSE 142 (H) 05/25/2023    BUN 13 05/25/2023    CREATININE 0.60 05/25/2023    EGFRIFNONA 89 10/25/2019     05/25/2023    K 4.2 05/25/2023     05/25/2023    CO2 23.0 05/25/2023    CALCIUM 8.8 05/25/2023    ALBUMIN 3.4 (L) 05/24/2023    AST 19 05/24/2023    ALT 13 05/24/2023    BILITOT 0.3 05/24/2023 5/24/23 KUB:  Study Result    Narrative & Impression   XR ABDOMEN KUB     Date of Exam: 5/24/2023 7:56 AM EDT     Indication: right flank pain. Renal stone seen on outside CT study right side.     Comparison: CT abdomen pelvis [facility] 5/22/2023     Findings:  Radiopaque stone is seen overlying the right sacrum measures about 5 mm this was seen in the same location on the recent CT study. Hydronephrosis not evaluated on radiograph. No other radiopaque renal calculi are seen on this exam. No abnormally dilated   loops of bowel. Moderate stool burden. Lung bases are not completely included in field-of-view.     IMPRESSION:  Impression:  5 mm radiopaque stone overlies the right sacrum. This is unchanged position compared to the CT study from 5/22/2023. It causes severe hydronephrosis on CT exam.  Moderate stool burden. Question constipation.       Cancer Staging (if applicable)  Cancer Patient: __ yes __no __unknown__N/A; If yes, clinical stage T:__ N:__M:__, stage group or __N/A      Impression: Ureteral stone      Plan: URETEROSCOPY LASER LITHOTRIPSY WITH STENT INSERTION - RIGHT       Ping Yang, CASS   6/5/2023   11:06 EDT  "

## 2023-06-05 NOTE — BRIEF OP NOTE
URETEROSCOPY LASER LITHOTRIPSY WITH STENT INSERTION  Progress Note    Gypsy Hopsonbernadette  6/5/2023    Pre-op Diagnosis:   Right ureteral stone [N20.1]       Post-Op Diagnosis Codes:     * Right ureteral stone [N20.1]         Procedure(s):  CYSTOSCOPY, RIGHT URETEROSCOPY LASER LITHOTRIPSY WITH STENT INSERTION          Surgeon(s):  Parag Pascual MD    Anesthesia: General    Staff:   Circulator: Amanda Gómez RN  Laser Staff: Kelli Payton RN  Scrub Person: Marilu Talamantes         Estimated Blood Loss: none    Urine Voided: * No values recorded between 6/5/2023  1:04 PM and 6/5/2023  1:33 PM *    Specimens:                A: RIGHT URETERAL STONE FOR ANALYSIS          Drains:   Ureteral Drain/Stent Right ureter 6 Fr. (Active)   Site Assessment Clean;Intact 05/25/23 0400   Dressing Status Clean;Dry;Intact 05/25/23 0400       Findings: Obstructing 5 to 7 mm distal right ureteral stone with impaction, uncomplicated laser lithotripsy, all fragments irrigated out the ureter, 4.8 [place] by 24 cm double-J stent on right with strings.        Complications: None          Parag Pascual MD     Date: 6/5/2023  Time: 13:33 EDT

## 2023-06-09 LAB
CALCIUM OXALATE DIHYDRATE MFR STONE IR: 20 %
COLOR STONE: NORMAL
COM MFR STONE: 80 %
COMPN STONE: NORMAL
LABORATORY COMMENT REPORT: NORMAL
LABORATORY COMMENT REPORT: NORMAL
Lab: NORMAL
Lab: NORMAL
PHOTO: NORMAL
SIZE STONE: NORMAL MM
SPEC SOURCE SUBJ: NORMAL
WT STONE: 11 MG

## 2023-06-10 ENCOUNTER — APPOINTMENT (OUTPATIENT)
Dept: CT IMAGING | Facility: HOSPITAL | Age: 53
End: 2023-06-10
Payer: COMMERCIAL

## 2023-06-10 ENCOUNTER — HOSPITAL ENCOUNTER (INPATIENT)
Facility: HOSPITAL | Age: 53
LOS: 2 days | Discharge: HOME | End: 2023-06-14
Attending: EMERGENCY MEDICINE | Admitting: INTERNAL MEDICINE
Payer: COMMERCIAL

## 2023-06-10 ENCOUNTER — APPOINTMENT (OUTPATIENT)
Dept: GENERAL RADIOLOGY | Facility: HOSPITAL | Age: 53
End: 2023-06-10
Payer: COMMERCIAL

## 2023-06-10 DIAGNOSIS — R50.9 ACUTE FEBRILE ILLNESS: Primary | ICD-10-CM

## 2023-06-10 DIAGNOSIS — Z98.890 HISTORY OF UROLOGIC SURGERY: ICD-10-CM

## 2023-06-10 DIAGNOSIS — D72.819 LEUKOPENIA, UNSPECIFIED TYPE: ICD-10-CM

## 2023-06-10 LAB
ALBUMIN SERPL-MCNC: 4.1 G/DL (ref 3.5–5.2)
ALBUMIN/GLOB SERPL: 1.4 G/DL
ALP SERPL-CCNC: 105 U/L (ref 39–117)
ALT SERPL W P-5'-P-CCNC: 9 U/L (ref 1–33)
ANION GAP SERPL CALCULATED.3IONS-SCNC: 12 MMOL/L (ref 5–15)
AST SERPL-CCNC: 18 U/L (ref 1–32)
B PARAPERT DNA SPEC QL NAA+PROBE: NOT DETECTED
B PERT DNA SPEC QL NAA+PROBE: NOT DETECTED
BASOPHILS # BLD AUTO: 0.02 10*3/MM3 (ref 0–0.2)
BASOPHILS NFR BLD AUTO: 0.7 % (ref 0–1.5)
BILIRUB SERPL-MCNC: 0.2 MG/DL (ref 0–1.2)
BILIRUB UR QL STRIP: NEGATIVE
BUN SERPL-MCNC: 8 MG/DL (ref 6–20)
BUN/CREAT SERPL: 12.7 (ref 7–25)
C PNEUM DNA NPH QL NAA+NON-PROBE: NOT DETECTED
CALCIUM SPEC-SCNC: 9 MG/DL (ref 8.6–10.5)
CHLORIDE SERPL-SCNC: 103 MMOL/L (ref 98–107)
CLARITY UR: CLEAR
CO2 SERPL-SCNC: 23 MMOL/L (ref 22–29)
COLOR UR: YELLOW
CREAT SERPL-MCNC: 0.63 MG/DL (ref 0.57–1)
D-LACTATE SERPL-SCNC: 1.9 MMOL/L (ref 0.5–2)
DEPRECATED RDW RBC AUTO: 43.8 FL (ref 37–54)
EGFRCR SERPLBLD CKD-EPI 2021: 105.6 ML/MIN/1.73
EOSINOPHIL # BLD AUTO: 0.01 10*3/MM3 (ref 0–0.4)
EOSINOPHIL NFR BLD AUTO: 0.4 % (ref 0.3–6.2)
ERYTHROCYTE [DISTWIDTH] IN BLOOD BY AUTOMATED COUNT: 13.4 % (ref 12.3–15.4)
FLUAV SUBTYP SPEC NAA+PROBE: NOT DETECTED
FLUBV RNA ISLT QL NAA+PROBE: NOT DETECTED
GLOBULIN UR ELPH-MCNC: 3 GM/DL
GLUCOSE SERPL-MCNC: 102 MG/DL (ref 65–99)
GLUCOSE UR STRIP-MCNC: NEGATIVE MG/DL
HADV DNA SPEC NAA+PROBE: NOT DETECTED
HCOV 229E RNA SPEC QL NAA+PROBE: NOT DETECTED
HCOV HKU1 RNA SPEC QL NAA+PROBE: NOT DETECTED
HCOV NL63 RNA SPEC QL NAA+PROBE: NOT DETECTED
HCOV OC43 RNA SPEC QL NAA+PROBE: NOT DETECTED
HCT VFR BLD AUTO: 36.1 % (ref 34–46.6)
HGB BLD-MCNC: 11.3 G/DL (ref 12–15.9)
HGB UR QL STRIP.AUTO: NEGATIVE
HMPV RNA NPH QL NAA+NON-PROBE: NOT DETECTED
HOLD SPECIMEN: NORMAL
HPIV1 RNA ISLT QL NAA+PROBE: NOT DETECTED
HPIV2 RNA SPEC QL NAA+PROBE: NOT DETECTED
HPIV3 RNA NPH QL NAA+PROBE: NOT DETECTED
HPIV4 P GENE NPH QL NAA+PROBE: NOT DETECTED
IMM GRANULOCYTES # BLD AUTO: 0.01 10*3/MM3 (ref 0–0.05)
IMM GRANULOCYTES NFR BLD AUTO: 0.4 % (ref 0–0.5)
KETONES UR QL STRIP: NEGATIVE
LEUKOCYTE ESTERASE UR QL STRIP.AUTO: NEGATIVE
LIPASE SERPL-CCNC: 37 U/L (ref 13–60)
LYMPHOCYTES # BLD AUTO: 0.69 10*3/MM3 (ref 0.7–3.1)
LYMPHOCYTES NFR BLD AUTO: 24.6 % (ref 19.6–45.3)
M PNEUMO IGG SER IA-ACNC: NOT DETECTED
MCH RBC QN AUTO: 28 PG (ref 26.6–33)
MCHC RBC AUTO-ENTMCNC: 31.3 G/DL (ref 31.5–35.7)
MCV RBC AUTO: 89.4 FL (ref 79–97)
MONOCYTES # BLD AUTO: 0.18 10*3/MM3 (ref 0.1–0.9)
MONOCYTES NFR BLD AUTO: 6.4 % (ref 5–12)
NEUTROPHILS NFR BLD AUTO: 1.9 10*3/MM3 (ref 1.7–7)
NEUTROPHILS NFR BLD AUTO: 67.5 % (ref 42.7–76)
NITRITE UR QL STRIP: NEGATIVE
NRBC BLD AUTO-RTO: 0 /100 WBC (ref 0–0.2)
PH UR STRIP.AUTO: 7.5 [PH] (ref 5–8)
PLATELET # BLD AUTO: 267 10*3/MM3 (ref 140–450)
PMV BLD AUTO: 8.8 FL (ref 6–12)
POTASSIUM SERPL-SCNC: 3.8 MMOL/L (ref 3.5–5.2)
PROCALCITONIN SERPL-MCNC: 0.14 NG/ML (ref 0–0.25)
PROT SERPL-MCNC: 7.1 G/DL (ref 6–8.5)
PROT UR QL STRIP: NEGATIVE
RBC # BLD AUTO: 4.04 10*6/MM3 (ref 3.77–5.28)
RHINOVIRUS RNA SPEC NAA+PROBE: NOT DETECTED
RSV RNA NPH QL NAA+NON-PROBE: NOT DETECTED
SARS-COV-2 RNA NPH QL NAA+NON-PROBE: NOT DETECTED
SODIUM SERPL-SCNC: 138 MMOL/L (ref 136–145)
SP GR UR STRIP: 1.01 (ref 1–1.03)
UROBILINOGEN UR QL STRIP: NORMAL
WBC NRBC COR # BLD: 2.81 10*3/MM3 (ref 3.4–10.8)
WHOLE BLOOD HOLD COAG: NORMAL
WHOLE BLOOD HOLD SPECIMEN: NORMAL

## 2023-06-10 PROCEDURE — G0378 HOSPITAL OBSERVATION PER HR: HCPCS

## 2023-06-10 PROCEDURE — 84145 PROCALCITONIN (PCT): CPT | Performed by: INTERNAL MEDICINE

## 2023-06-10 PROCEDURE — 81003 URINALYSIS AUTO W/O SCOPE: CPT

## 2023-06-10 PROCEDURE — 25010000002 ENOXAPARIN PER 10 MG: Performed by: INTERNAL MEDICINE

## 2023-06-10 PROCEDURE — 99285 EMERGENCY DEPT VISIT HI MDM: CPT

## 2023-06-10 PROCEDURE — 87086 URINE CULTURE/COLONY COUNT: CPT | Performed by: EMERGENCY MEDICINE

## 2023-06-10 PROCEDURE — 87040 BLOOD CULTURE FOR BACTERIA: CPT | Performed by: EMERGENCY MEDICINE

## 2023-06-10 PROCEDURE — 83690 ASSAY OF LIPASE: CPT

## 2023-06-10 PROCEDURE — 25010000002 PROMETHAZINE PER 50 MG: Performed by: EMERGENCY MEDICINE

## 2023-06-10 PROCEDURE — 74176 CT ABD & PELVIS W/O CONTRAST: CPT

## 2023-06-10 PROCEDURE — 36415 COLL VENOUS BLD VENIPUNCTURE: CPT

## 2023-06-10 PROCEDURE — 85025 COMPLETE CBC W/AUTO DIFF WBC: CPT

## 2023-06-10 PROCEDURE — 25010000002 CEFTRIAXONE PER 250 MG: Performed by: EMERGENCY MEDICINE

## 2023-06-10 PROCEDURE — 83605 ASSAY OF LACTIC ACID: CPT

## 2023-06-10 PROCEDURE — 80053 COMPREHEN METABOLIC PANEL: CPT

## 2023-06-10 PROCEDURE — 25010000002 PROMETHAZINE PER 50 MG: Performed by: INTERNAL MEDICINE

## 2023-06-10 PROCEDURE — 0202U NFCT DS 22 TRGT SARS-COV-2: CPT | Performed by: EMERGENCY MEDICINE

## 2023-06-10 PROCEDURE — 71045 X-RAY EXAM CHEST 1 VIEW: CPT

## 2023-06-10 RX ORDER — BISACODYL 5 MG/1
5 TABLET, DELAYED RELEASE ORAL DAILY PRN
Status: DISCONTINUED | OUTPATIENT
Start: 2023-06-10 | End: 2023-06-14 | Stop reason: HOSPADM

## 2023-06-10 RX ORDER — ACETAMINOPHEN 325 MG/1
650 TABLET ORAL EVERY 4 HOURS PRN
Status: DISCONTINUED | OUTPATIENT
Start: 2023-06-10 | End: 2023-06-14 | Stop reason: HOSPADM

## 2023-06-10 RX ORDER — AMOXICILLIN 250 MG
2 CAPSULE ORAL 2 TIMES DAILY
Status: DISCONTINUED | OUTPATIENT
Start: 2023-06-10 | End: 2023-06-14 | Stop reason: HOSPADM

## 2023-06-10 RX ORDER — HYDROCODONE BITARTRATE AND ACETAMINOPHEN 5; 325 MG/1; MG/1
1 TABLET ORAL EVERY 6 HOURS PRN
Status: DISCONTINUED | OUTPATIENT
Start: 2023-06-10 | End: 2023-06-14 | Stop reason: HOSPADM

## 2023-06-10 RX ORDER — LEVOTHYROXINE SODIUM 0.07 MG/1
75 TABLET ORAL
Status: DISCONTINUED | OUTPATIENT
Start: 2023-06-11 | End: 2023-06-14 | Stop reason: HOSPADM

## 2023-06-10 RX ORDER — ENOXAPARIN SODIUM 100 MG/ML
40 INJECTION SUBCUTANEOUS DAILY
Status: DISCONTINUED | OUTPATIENT
Start: 2023-06-10 | End: 2023-06-14 | Stop reason: HOSPADM

## 2023-06-10 RX ORDER — ONDANSETRON 2 MG/ML
4 INJECTION INTRAMUSCULAR; INTRAVENOUS EVERY 6 HOURS PRN
Status: DISCONTINUED | OUTPATIENT
Start: 2023-06-10 | End: 2023-06-14 | Stop reason: HOSPADM

## 2023-06-10 RX ORDER — BISACODYL 10 MG
10 SUPPOSITORY, RECTAL RECTAL DAILY PRN
Status: DISCONTINUED | OUTPATIENT
Start: 2023-06-10 | End: 2023-06-14 | Stop reason: HOSPADM

## 2023-06-10 RX ORDER — ACETAMINOPHEN 500 MG
1000 TABLET ORAL ONCE
Status: COMPLETED | OUTPATIENT
Start: 2023-06-10 | End: 2023-06-10

## 2023-06-10 RX ORDER — SODIUM CHLORIDE 9 MG/ML
40 INJECTION, SOLUTION INTRAVENOUS AS NEEDED
Status: DISCONTINUED | OUTPATIENT
Start: 2023-06-10 | End: 2023-06-14 | Stop reason: HOSPADM

## 2023-06-10 RX ORDER — CHOLECALCIFEROL (VITAMIN D3) 125 MCG
10 CAPSULE ORAL NIGHTLY PRN
Status: DISCONTINUED | OUTPATIENT
Start: 2023-06-10 | End: 2023-06-14 | Stop reason: HOSPADM

## 2023-06-10 RX ORDER — POLYETHYLENE GLYCOL 3350 17 G/17G
17 POWDER, FOR SOLUTION ORAL DAILY PRN
Status: DISCONTINUED | OUTPATIENT
Start: 2023-06-10 | End: 2023-06-14 | Stop reason: HOSPADM

## 2023-06-10 RX ORDER — SODIUM CHLORIDE 0.9 % (FLUSH) 0.9 %
10 SYRINGE (ML) INJECTION EVERY 12 HOURS SCHEDULED
Status: DISCONTINUED | OUTPATIENT
Start: 2023-06-10 | End: 2023-06-14 | Stop reason: HOSPADM

## 2023-06-10 RX ORDER — SODIUM CHLORIDE 9 MG/ML
10 INJECTION INTRAVENOUS AS NEEDED
Status: DISCONTINUED | OUTPATIENT
Start: 2023-06-10 | End: 2023-06-14 | Stop reason: HOSPADM

## 2023-06-10 RX ORDER — SODIUM CHLORIDE 0.9 % (FLUSH) 0.9 %
10 SYRINGE (ML) INJECTION AS NEEDED
Status: DISCONTINUED | OUTPATIENT
Start: 2023-06-10 | End: 2023-06-14 | Stop reason: HOSPADM

## 2023-06-10 RX ADMIN — SODIUM CHLORIDE 10 ML: 9 INJECTION, SOLUTION INTRAMUSCULAR; INTRAVENOUS; SUBCUTANEOUS at 11:46

## 2023-06-10 RX ADMIN — ACETAMINOPHEN 650 MG: 325 TABLET ORAL at 20:08

## 2023-06-10 RX ADMIN — PROMETHAZINE HYDROCHLORIDE 12.5 MG: 25 INJECTION INTRAMUSCULAR; INTRAVENOUS at 11:02

## 2023-06-10 RX ADMIN — Medication 10 ML: at 20:08

## 2023-06-10 RX ADMIN — SODIUM CHLORIDE 2 G: 900 INJECTION INTRAVENOUS at 12:11

## 2023-06-10 RX ADMIN — ENOXAPARIN SODIUM 40 MG: 100 INJECTION SUBCUTANEOUS at 15:30

## 2023-06-10 RX ADMIN — PROMETHAZINE HYDROCHLORIDE 12.5 MG: 25 INJECTION INTRAMUSCULAR; INTRAVENOUS at 21:04

## 2023-06-10 RX ADMIN — SENNOSIDES AND DOCUSATE SODIUM 2 TABLET: 50; 8.6 TABLET ORAL at 20:08

## 2023-06-10 RX ADMIN — ACETAMINOPHEN 1000 MG: 500 TABLET ORAL at 11:02

## 2023-06-10 NOTE — H&P
[facility]  HISTORY AND PHYSICAL    Patient Name: Gypsy Mann  : 1970  MRN: [id number]  Primary Care Physician: Genaro Casey MD  Date of admission: 6/10/2023      Subjective   Subjective     Chief Complaint:  fever    HPI:  Gypsy Mann is a 53 y.o. female with PMHx significant for hypothyroidism, lumbar radiculopathy and recent right ureteral stone with hydronephrosis s/p urethral dilation, right ureteral stent placement and retrograde pyelogram with Dr. Pascual on . She was discharged on 10 days of Bactrim. She underwent laser lithotripsy with ureteral stent stent exchange on  and placed on several days of macrobid. She states she removed the stent herself on Wednesday and completed her antibiotics yesterday when she began having fevers up to 102 and malaise at home. She called Urology office who advised her to go and get checked in the ED. She deneis nausea/vomiting. Having ongoing mild flank pain. No hematuria.      Review of Systems   Gen- + fevers, chills  CV- No chest pain, palpitations  Resp- No cough, dyspnea  GI- No N/V/D, abd pain      Personal History     Past Medical History:   Diagnosis Date   • Back pain    • Bronchitis    • Hypothyroid    • Insomnia    • Kidney stones    • Lumbosacral disc disease Aug 2022             Past Surgical History:   Procedure Laterality Date   • BACK SURGERY  2022    L5/S1 Diskectomy   • BILATERAL BREAST REDUCTION Bilateral 10/31/2019    Procedure: BREAST REDUCTION BILATERAL;  Surgeon: Francis Galvan MD;  Location: [facility];  Service: Plastics   •  SECTION      x two   • COLONOSCOPY     • CYSTOSCOPY, URETEROSCOPY, RETROGRADE PYELOGRAM, STONE EXTRACTION, STENT INSERTION Right 2023    Procedure: CYSTOSCOPY RIGHT RETROGRADE PYELOGRAM AND RIGHT URETERAL STENT PLACEMENT;  Surgeon: Parag Pascual MD;  Location: [facility];  Service: Urology;  Laterality: Right;   • EPIDURAL BLOCK   8-24-22 and 9-28-22   • LUMBAR DISCECTOMY Right 12/30/2022    Procedure: LUMBAR DISCECTOMY L5-S1 RIGHT;  Surgeon: Yared Zapien MD;  Location: [facility];  Service: Neurosurgery;  Laterality: Right;   • REDUCTION MAMMAPLASTY Bilateral     2020   • URETEROSCOPY LASER LITHOTRIPSY WITH STENT INSERTION Right 6/5/2023    Procedure: URETEROSCOPY LASER LITHOTRIPSY WITH STENT INSERTION - RIGHT;  Surgeon: Parag Pascual MD;  Location: [facility];  Service: Urology;  Laterality: Right;       Family History: family history includes COPD in her father; Cancer in her maternal grandmother and mother; Diabetes in her son; Emphysema in her father; Heart disease in her father; Hyperlipidemia in her father; Hypertension in her father; Thyroid disease in her mother.     Social History:  reports that she has never smoked. She has never used smokeless tobacco. She reports that she does not drink alcohol and does not use drugs.  Social History     Social History Narrative    Works at [employer]       Medications:  Available home medication information reviewed.  (Not in a hospital admission)      Allergies   Allergen Reactions   • Zofran [Ondansetron] Headache       Objective   Objective     Vital Signs:   Temp:  [100.6 °F (38.1 °C)] 100.6 °F (38.1 °C)  Heart Rate:  [58-96] 58  Resp:  [20] 20  BP: (101-113)/(56-65) 101/56       Physical Exam   Constitutional: Awake, alert  Eyes: PERRLA, sclerae anicteric, no conjunctival injection  HENT: NCAT, mucous membranes moist  Neck: Supple, no thyromegaly, no lymphadenopathy, trachea midline  Respiratory: Clear to auscultation bilaterally, nonlabored respirations   Cardiovascular: RRR, no murmurs, rubs, or gallops, palpable pedal pulses bilaterally  Gastrointestinal: Positive bowel sounds, soft, mildly tender right side, nondistended  Musculoskeletal: No bilateral ankle edema, no clubbing or cyanosis to extremities  Psychiatric: Appropriate affect, cooperative  Neurologic:  Oriented x 3, strength symmetric in all extremities, Cranial Nerves grossly intact to confrontation, speech clear  Skin: No rashes      Result Review:  I have personally reviewed the results from the time of this admission to 6/10/2023 14:31 EDT and agree with these findings:  [x]  Laboratory list / accordion  [x]  Microbiology  [x]  Radiology  [x]  EKG/Telemetry   [x]  Cardiology/Vascular   [x]  Pathology  []  Old records  []  Other:  Most notable findings include:        LAB RESULTS:      Lab 06/10/23  1022   WBC 2.81*   HEMOGLOBIN 11.3*   HEMATOCRIT 36.1   PLATELETS 267   NEUTROS ABS 1.90   IMMATURE GRANS (ABS) 0.01   LYMPHS ABS 0.69*   MONOS ABS 0.18   EOS ABS 0.01   MCV 89.4   LACTATE 1.9         Lab 06/10/23  1022   SODIUM 138   POTASSIUM 3.8   CHLORIDE 103   CO2 23.0   ANION GAP 12.0   BUN 8   CREATININE 0.63   EGFR 105.6   GLUCOSE 102*   CALCIUM 9.0         Lab 06/10/23  1022   TOTAL PROTEIN 7.1   ALBUMIN 4.1   GLOBULIN 3.0   ALT (SGPT) 9   AST (SGOT) 18   BILIRUBIN 0.2   ALK PHOS 105   LIPASE 37                     UA          5/24/2023    07:47 6/10/2023    10:23   Urinalysis   Squamous Epithelial Cells, UA 3-6     Specific Gravity, UA <=1.005  1.014    Ketones, UA Negative  Negative    Blood, UA Trace  Negative    Leukocytes, UA Large (3+)  Negative    Nitrite, UA Negative  Negative    RBC, UA 3-6     WBC, UA 21-30     Bacteria, UA Trace         Microbiology Results (last 10 days)       Procedure Component Value - Date/Time    COVID PRE-OP / PRE-PROCEDURE SCREENING ORDER (NO ISOLATION) - Swab, Nasopharynx [[id number]]  (Normal) Collected: 06/10/23 1212    Lab Status: Final result Specimen: Swab from Nasopharynx Updated: 06/10/23 1318    Narrative:      The following orders were created for panel order COVID PRE-OP / PRE-PROCEDURE SCREENING ORDER (NO ISOLATION) - Swab, Nasopharynx.  Procedure                               Abnormality         Status                     ---------                                -----------         ------                     Respiratory Panel PCR w/...[[id number]]  Normal              Final result                 Please view results for these tests on the individual orders.    Respiratory Panel PCR w/COVID-19(SARS-CoV-2) TOO/CHR[facility]/C[facility] In-House, NP Swab in UTM/VTM, 3-4 HR TAT - Swab, Nasopharynx [[id number]]  (Normal) Collected: 06/10/23 1212    Lab Status: Final result Specimen: Swab from Nasopharynx Updated: 06/10/23 1318     ADENOVIRUS, PCR Not Detected     Coronavirus 229E Not Detected     Coronavirus HKU1 Not Detected     Coronavirus NL63 Not Detected     Coronavirus OC43 Not Detected     COVID19 Not Detected     Human Metapneumovirus Not Detected     Human Rhinovirus/Enterovirus Not Detected     Influenza A PCR Not Detected     Influenza B PCR Not Detected     Parainfluenza Virus 1 Not Detected     Parainfluenza Virus 2 Not Detected     Parainfluenza Virus 3 Not Detected     Parainfluenza Virus 4 Not Detected     RSV, PCR Not Detected     Bordetella pertussis pcr Not Detected     Bordetella parapertussis PCR Not Detected     Chlamydophila pneumoniae PCR Not Detected     Mycoplasma pneumo by PCR Not Detected    Narrative:      In the setting of a positive respiratory panel with a viral infection PLUS a negative procalcitonin without other underlying concern for bacterial infection, consider observing off antibiotics or discontinuation of antibiotics and continue supportive care. If the respiratory panel is positive for atypical bacterial infection (Bordetella pertussis, Chlamydophila pneumoniae, or Mycoplasma pneumoniae), consider antibiotic de-escalation to target atypical bacterial infection.            CT Abdomen Pelvis Without Contrast    Result Date: 6/10/2023  CT ABDOMEN PELVIS WO CONTRAST Date of Exam: 6/10/2023 10:49 AM EDT Indication: right flank pain, fever, recent stent Comparison: KUB 5/24/2023, outside CT abdomen/pelvis 5/22/2023 Technique: Axial CT images were  obtained of the abdomen and pelvis without the administration of contrast. Reconstructed coronal and sagittal images were also obtained. Automated exposure control and iterative construction methods were used. Findings: Included lung bases are clear. No pleural effusion. Previously seen distal right ureteral calculus is no longer visualized, and there has been interval resolution of previously seen right hydronephrosis compared to 5/22/2023 CT with only mild residual dilatation of the right renal pelvis. There is mild right perinephric stranding, which also appears decreased. No nephroureterolithiasis. Adrenal glands, spleen, pancreas, liver, gallbladder appear unremarkable for noncontrast CT. No abnormal bowel distention is seen. Appendix is not visualized, but no right lower quadrant inflammation is noted. There are no urinary bladder calcifications.  Urinary bladder is incompletely distended. No significant free fluid or lymphadenopathy is noted. No acute osseous abnormality is identified. There is leftward curvature of the lumbar spine.     Impression: Impression: Resolution of previously seen right ureteral calculus and right hydronephrosis. Mild right perinephric stranding, decreased compared to 5/22/2023 CT. Electronically Signed: Honey Pro  6/10/2023 11:05 AM EDT  Workstation ID: [id number]    XR Chest 1 View    Result Date: 6/10/2023  XR CHEST 1 VW Date of Exam: 6/10/2023 11:58 AM EDT Indication: Fever Comparison: CT abdomen and pelvis 6/10/2023. No previous chest imaging available for comparison. Findings: Lungs are well expanded and clear. No consolidation or large pleural effusion is seen. Cardiomediastinal contours appear within normal limits. There is rightward curvature of the thoracic spine.     Impression: Impression: No acute cardiopulmonary abnormality is identified. Electronically Signed: Honey Pro  6/10/2023 12:25 PM EDT  Workstation ID: [id number]         Assessment & Plan   Assessment &  Plan     Active Hospital Problems    Diagnosis  POA   • **Acute febrile illness [R50.9]  Yes   • Ureteral stone [N20.1]  Yes     Gypsy Mann is a 53 y.o. female with PMHx significant for hypothyroidism, lumbar radiculopathy and recent right ureteral stone with hydronephrosis s/p urethral dilation, right ureteral stent placement and retrograde pyelogram with Dr. Pascual on 5/24 and she underwent laser lithotripsy with ureteral stent stent exchange on 6/5.    Fever w/Leukopenia  Recent Urologic Procedures   - UA benign, blood cultures pending, Resp PCR negative  - CT scan reviewed, actually appears improved from previous, no further ureteral calculus or hydro, perinephric stranding also improved  - stent removed by patient on Wednesday, will have Urology evaluate - Dr. Mccarthy made aware by ED physician, patient followed by Dr. Pascual  - possibly induced by abx use (macrobid, Bactrim)  - will continue empiric rocephin for now, f/u cultures in morning     Hypothyroidism:  - levothyroxine    Chronic Pain:  - continue home pain medications    DVT prophylaxis:  Lovenox      CODE STATUS:    Code Status and Medical Interventions:   Ordered at: 06/10/23 1430     Level Of Support Discussed With:    Patient     Code Status (Patient has no pulse and is not breathing):    CPR (Attempt to Resuscitate)     Medical Interventions (Patient has pulse or is breathing):    Full Support       Expected Discharge tomorrow  Expected discharge date/ time has not been documented.     Petrona Asher, DO  06/10/23

## 2023-06-10 NOTE — PLAN OF CARE
Problem: Adult Inpatient Plan of Care  Goal: Plan of Care Review  Outcome: Ongoing, Progressing  Flowsheets (Taken 6/10/2023 9986)  Outcome Evaluation: VSS. RA. No complaints of pain. No fever since being on the floor. Pt to be seen by urology tomorrow. No other concerns at this time. Will continue with the plan of care.   Goal Outcome Evaluation:              Outcome Evaluation: VSS. RA. No complaints of pain. No fever since being on the floor. Pt to be seen by urology tomorrow. No other concerns at this time. Will continue with the plan of care.

## 2023-06-10 NOTE — ED PROVIDER NOTES
EMERGENCY DEPARTMENT ENCOUNTER    Pt Name: Gypsy Mann  MRN: [id number]  Pt :   1970  Room Number:    Date of encounter:  6/10/2023  PCP: Genaro Casey MD  ED Provider: Franky Ny MD    Historian: Patient and       HPI:  Chief Complaint: Fever, flank pain, recent urologic procedure        Context: Gypsy Mann is a 53 y.o. female who presents to the ED c/o right low flank/back discomfort along with fever to 102.4 last night.  The patient has had extensive urologic issues as of recently.  She went to [facility] emergency department and was found to have a right ureteral stone.  She developed a fever of 104 degrees and was cared for by Dr. Pascual of our urology service.  A stent was placed and subsequently removed around 2023.  At that point a renal stone was removed along with it and then a smaller stent was placed back inside the same area on the right side.  The patient under her urologist's direction I removed the stent while at home 3 days ago.  The patient has been on antibiotics, first Bactrim and is now nitrofurantoin which finished yesterday.  The patient reports some nausea and bloating along with constipation over the last 4 days.  She has mild discomfort 3 out of 10 on the pain scale right low back/flank.  She spoke with Dr. Parviz Dang of urology, on-call for Dr. Pascual, earlier today and was asked to come to the emergency department for further evaluation.      PAST MEDICAL HISTORY  Past Medical History:   Diagnosis Date   • Back pain    • Bronchitis    • Hypothyroid    • Insomnia    • Kidney stones    • Lumbosacral disc disease Aug 2022         PAST SURGICAL HISTORY  Past Surgical History:   Procedure Laterality Date   • BACK SURGERY  2022    L5/S1 Diskectomy   • BILATERAL BREAST REDUCTION Bilateral 10/31/2019    Procedure: BREAST REDUCTION BILATERAL;  Surgeon: Francis Galvan MD;  Location: [facility];  Service: Plastics   •  SECTION      x  two   • COLONOSCOPY     • CYSTOSCOPY, URETEROSCOPY, RETROGRADE PYELOGRAM, STONE EXTRACTION, STENT INSERTION Right 5/24/2023    Procedure: CYSTOSCOPY RIGHT RETROGRADE PYELOGRAM AND RIGHT URETERAL STENT PLACEMENT;  Surgeon: Parag Pascual MD;  Location:  [facility];  Service: Urology;  Laterality: Right;   • EPIDURAL BLOCK  8-24-22 and 9-28-22   • LUMBAR DISCECTOMY Right 12/30/2022    Procedure: LUMBAR DISCECTOMY L5-S1 RIGHT;  Surgeon: Yared Zapien MD;  Location:  [facility];  Service: Neurosurgery;  Laterality: Right;   • REDUCTION MAMMAPLASTY Bilateral     2020   • URETEROSCOPY LASER LITHOTRIPSY WITH STENT INSERTION Right 6/5/2023    Procedure: URETEROSCOPY LASER LITHOTRIPSY WITH STENT INSERTION - RIGHT;  Surgeon: Parag Pascual MD;  Location:  [facility];  Service: Urology;  Laterality: Right;         FAMILY HISTORY  Family History   Problem Relation Age of Onset   • Cancer Mother         Lung/Brain   • Thyroid disease Mother    • Hypertension Father    • Heart disease Father    • Emphysema Father    • COPD Father    • Hyperlipidemia Father    • Cancer Maternal Grandmother         Lung   • Diabetes Son    • Breast cancer Neg Hx    • Ovarian cancer Neg Hx          SOCIAL HISTORY  Social History     Socioeconomic History   • Marital status:    Tobacco Use   • Smoking status: Never   • Smokeless tobacco: Never   Vaping Use   • Vaping Use: Never used   Substance and Sexual Activity   • Alcohol use: No   • Drug use: No   • Sexual activity: Defer     Partners: Male     Birth control/protection: Surgical         ALLERGIES  Zofran [ondansetron]        REVIEW OF SYSTEMS  Review of Systems       All systems reviewed and negative except for those discussed in HPI.       PHYSICAL EXAM    I have reviewed the triage vital signs and nursing notes.    ED Triage Vitals [06/10/23 0959]   Temp Heart Rate Resp BP SpO2   (!) 100.6 °F (38.1 °C) 96 20 105/65 95 %      Temp src Heart Rate Source Patient Position BP  Location FiO2 (%)   Oral Monitor Sitting Left arm --       Physical Exam  GENERAL:   Appears a little uncomfortable but nontoxic.  HENT: Nares patent.  Dry mucous membranes  EYES: No scleral icterus.  CV: Regular rhythm, borderline tachycardic rate.  No murmurs gallops rubs  RESPIRATORY: Normal effort.  No audible wheezes, rales or rhonchi.  Clear to auscultation  ABDOMEN: Soft, nontender with the exception of mild tenderness in the epigastric region only.  No lateralizing tenderness whatsoever.  MUSCULOSKELETAL: No deformities.  No CVA tenderness overlying the kidneys however there is some lower back pain on the right side with palpation of the musculature as well as with percussion.  NEURO: Alert, moves all extremities, follows commands.  SKIN: Warm, dry, no rash visualized.      LAB RESULTS  Recent Results (from the past 24 hour(s))   Comprehensive Metabolic Panel    Collection Time: 06/10/23 10:22 AM    Specimen: Blood   Result Value Ref Range    Glucose 102 (H) 65 - 99 mg/dL    BUN 8 6 - 20 mg/dL    Creatinine 0.63 0.57 - 1.00 mg/dL    Sodium 138 136 - 145 mmol/L    Potassium 3.8 3.5 - 5.2 mmol/L    Chloride 103 98 - 107 mmol/L    CO2 23.0 22.0 - 29.0 mmol/L    Calcium 9.0 8.6 - 10.5 mg/dL    Total Protein 7.1 6.0 - 8.5 g/dL    Albumin 4.1 3.5 - 5.2 g/dL    ALT (SGPT) 9 1 - 33 U/L    AST (SGOT) 18 1 - 32 U/L    Alkaline Phosphatase 105 39 - 117 U/L    Total Bilirubin 0.2 0.0 - 1.2 mg/dL    Globulin 3.0 gm/dL    A/G Ratio 1.4 g/dL    BUN/Creatinine Ratio 12.7 7.0 - 25.0    Anion Gap 12.0 5.0 - 15.0 mmol/L    eGFR 105.6 >60.0 mL/min/1.73   Lipase    Collection Time: 06/10/23 10:22 AM    Specimen: Blood   Result Value Ref Range    Lipase 37 13 - 60 U/L   Lactic Acid, Plasma    Collection Time: 06/10/23 10:22 AM    Specimen: Blood   Result Value Ref Range    Lactate 1.9 0.5 - 2.0 mmol/L   Green Top (Gel)    Collection Time: 06/10/23 10:22 AM   Result Value Ref Range    Extra Tube Hold for add-ons.    Lavender Top     Collection Time: 06/10/23 10:22 AM   Result Value Ref Range    Extra Tube hold for add-on    Gold Top - SST    Collection Time: 06/10/23 10:22 AM   Result Value Ref Range    Extra Tube Hold for add-ons.    Light Blue Top    Collection Time: 06/10/23 10:22 AM   Result Value Ref Range    Extra Tube Hold for add-ons.    CBC Auto Differential    Collection Time: 06/10/23 10:22 AM    Specimen: Blood   Result Value Ref Range    WBC 2.81 (L) 3.40 - 10.80 10*3/mm3    RBC 4.04 3.77 - 5.28 10*6/mm3    Hemoglobin 11.3 (L) 12.0 - 15.9 g/dL    Hematocrit 36.1 34.0 - 46.6 %    MCV 89.4 79.0 - 97.0 fL    MCH 28.0 26.6 - 33.0 pg    MCHC 31.3 (L) 31.5 - 35.7 g/dL    RDW 13.4 12.3 - 15.4 %    RDW-SD 43.8 37.0 - 54.0 fl    MPV 8.8 6.0 - 12.0 fL    Platelets 267 140 - 450 10*3/mm3    Neutrophil % 67.5 42.7 - 76.0 %    Lymphocyte % 24.6 19.6 - 45.3 %    Monocyte % 6.4 5.0 - 12.0 %    Eosinophil % 0.4 0.3 - 6.2 %    Basophil % 0.7 0.0 - 1.5 %    Immature Grans % 0.4 0.0 - 0.5 %    Neutrophils, Absolute 1.90 1.70 - 7.00 10*3/mm3    Lymphocytes, Absolute 0.69 (L) 0.70 - 3.10 10*3/mm3    Monocytes, Absolute 0.18 0.10 - 0.90 10*3/mm3    Eosinophils, Absolute 0.01 0.00 - 0.40 10*3/mm3    Basophils, Absolute 0.02 0.00 - 0.20 10*3/mm3    Immature Grans, Absolute 0.01 0.00 - 0.05 10*3/mm3    nRBC 0.0 0.0 - 0.2 /100 WBC   Urinalysis With Microscopic If Indicated (No Culture) - Urine, Clean Catch    Collection Time: 06/10/23 10:23 AM    Specimen: Urine, Clean Catch   Result Value Ref Range    Color, UA Yellow Yellow, Straw    Appearance, UA Clear Clear    pH, UA 7.5 5.0 - 8.0    Specific Gravity, UA 1.014 1.001 - 1.030    Glucose, UA Negative Negative    Ketones, UA Negative Negative    Bilirubin, UA Negative Negative    Blood, UA Negative Negative    Protein, UA Negative Negative    Leuk Esterase, UA Negative Negative    Nitrite, UA Negative Negative    Urobilinogen, UA 0.2 E.U./dL 0.2 - 1.0 E.U./dL   Respiratory Panel PCR  w/COVID-19(SARS-CoV-2) [facility], NP Swab in UTM/VTM, 3-4 HR TAT - Swab, Nasopharynx    Collection Time: 06/10/23 12:12 PM    Specimen: Nasopharynx; Swab   Result Value Ref Range    ADENOVIRUS, PCR Not Detected Not Detected    Coronavirus 229E Not Detected Not Detected    Coronavirus HKU1 Not Detected Not Detected    Coronavirus NL63 Not Detected Not Detected    Coronavirus OC43 Not Detected Not Detected    COVID19 Not Detected Not Detected - Ref. Range    Human Metapneumovirus Not Detected Not Detected    Human Rhinovirus/Enterovirus Not Detected Not Detected    Influenza A PCR Not Detected Not Detected    Influenza B PCR Not Detected Not Detected    Parainfluenza Virus 1 Not Detected Not Detected    Parainfluenza Virus 2 Not Detected Not Detected    Parainfluenza Virus 3 Not Detected Not Detected    Parainfluenza Virus 4 Not Detected Not Detected    RSV, PCR Not Detected Not Detected    Bordetella pertussis pcr Not Detected Not Detected    Bordetella parapertussis PCR Not Detected Not Detected    Chlamydophila pneumoniae PCR Not Detected Not Detected    Mycoplasma pneumo by PCR Not Detected Not Detected       If labs were ordered, I independently reviewed the results and considered them in treating the patient.        RADIOLOGY  CT Abdomen Pelvis Without Contrast    Result Date: 6/10/2023  CT ABDOMEN PELVIS WO CONTRAST Date of Exam: 6/10/2023 10:49 AM EDT Indication: right flank pain, fever, recent stent Comparison: KUB 5/24/2023, outside CT abdomen/pelvis 5/22/2023 Technique: Axial CT images were obtained of the abdomen and pelvis without the administration of contrast. Reconstructed coronal and sagittal images were also obtained. Automated exposure control and iterative construction methods were used. Findings: Included lung bases are clear. No pleural effusion. Previously seen distal right ureteral calculus is no longer visualized, and there has been interval resolution of previously  seen right hydronephrosis compared to 5/22/2023 CT with only mild residual dilatation of the right renal pelvis. There is mild right perinephric stranding, which also appears decreased. No nephroureterolithiasis. Adrenal glands, spleen, pancreas, liver, gallbladder appear unremarkable for noncontrast CT. No abnormal bowel distention is seen. Appendix is not visualized, but no right lower quadrant inflammation is noted. There are no urinary bladder calcifications.  Urinary bladder is incompletely distended. No significant free fluid or lymphadenopathy is noted. No acute osseous abnormality is identified. There is leftward curvature of the lumbar spine.     Impression: Resolution of previously seen right ureteral calculus and right hydronephrosis. Mild right perinephric stranding, decreased compared to 5/22/2023 CT. Electronically Signed: Honey Pro  6/10/2023 11:05 AM EDT  Workstation ID: [id number]    XR Chest 1 View    Result Date: 6/10/2023  XR CHEST 1 VW Date of Exam: 6/10/2023 11:58 AM EDT Indication: Fever Comparison: CT abdomen and pelvis 6/10/2023. No previous chest imaging available for comparison. Findings: Lungs are well expanded and clear. No consolidation or large pleural effusion is seen. Cardiomediastinal contours appear within normal limits. There is rightward curvature of the thoracic spine.     Impression: No acute cardiopulmonary abnormality is identified. Electronically Signed: Honey Pro  6/10/2023 12:25 PM EDT  Workstation ID: [id number]      I ordered and independently reviewed the above noted radiographic studies.      I viewed images of CT abdomen pelvis which showed no ureteral calculus or evidence of abscess per my independent interpretation.    See radiologist's dictation for official interpretation.        PROCEDURES    Procedures    No orders to display       MEDICATIONS GIVEN IN ER    Medications   Sodium Chloride (PF) 0.9 % 10 mL (10 mL Intravenous Given 6/10/23 1146)   promethazine  (PHENERGAN) 12.5 mg in sodium chloride 0.9 % 50 mL (0 mg Intravenous Stopped 6/10/23 1135)   acetaminophen (TYLENOL) tablet 1,000 mg (1,000 mg Oral Given 6/10/23 1102)   cefTRIAXone (ROCEPHIN) 2 g/100 mL 0.9% NS IVPB (MBP) (0 g Intravenous Stopped 6/10/23 1311)         MEDICAL DECISION MAKING, PROGRESS, and CONSULTS    All labs have been independently reviewed by me.  All radiology studies have been reviewed by me and the radiologist dictating the report.  All EKG's have been independently viewed and interpreted by me.      Discussion below represents my analysis of pertinent findings related to patient's condition, differential diagnosis, treatment plan and final disposition.      Differential diagnosis:    Infected ureteral stone versus ureteral colic versus other intra-abdominal infections, etc.      Additional sources:    - Discussed/ obtained information from independent historians: Patient's  provides some of the history.    - External (non-ED) record review: I reviewed discharge summary from stay in our hospital from 5/24/2023 through 5/25/2023 when patient was treated for right-sided ureteral stone.  Urine culture from 5/24/2023.  Urine Culture 25,000 CFU/mL Escherichia coli Abnormal        <25,000 CFU/mL Normal Urogenital Lupe             - Chronic or social conditions impacting care: Recurrent ureteral stones.    - Shared decision making: Patient and  in agreement with current plan for evaluation, treatment, consultation with urology.      Orders placed during this visit:  Orders Placed This Encounter   Procedures   • Blood Culture - Blood,   • Blood Culture - Blood,   • Urine Culture - Urine,   • COVID PRE-OP / PRE-PROCEDURE SCREENING ORDER (NO ISOLATION) - Swab, Nasopharynx   • Respiratory Panel PCR w/COVID-19(SARS-CoV-2) [facility], NP Swab in UTM/VTM, 3-4 HR TAT - Swab, Nasopharynx   • CT Abdomen Pelvis Without Contrast   • XR Chest 1 View   • [place] Draw   •  Comprehensive Metabolic Panel   • Lipase   • Urinalysis With Microscopic If Indicated (No Culture) - Urine, Clean Catch   • Lactic Acid, Plasma   • CBC Auto Differential   • NPO Diet NPO Type: Strict NPO   • Undress & Gown   • Insert Peripheral IV   • CBC & Differential   • Green Top (Gel)   • Lavender Top   • Gold Top - SST   • Gray Top   • Light Blue Top         Additional orders considered but not ordered:  IVP    ED Course:    Consultants: Urology    ED Course as of 06/10/23 1414   Sat Rodney 10, 2023   1302 I spoke with Dr. Mccarthy, on-call urology for Dr. Pascual.  Case discussed in detail.  We decided that consultation with infectious disease would be most appropriate. [MS]   1401 Paged Dr. Avila, on-call infectious disease to discuss this case further. [MS]   1414 Case discussed in detail with Dr. Green.  He agrees with the Rocephin for now.  He recommends admission to the hospitalist and I have contacted Dr. Ata Mcguire.  I have discussed the possibility of nitrofurantoin causing the leukopenia and possibly even the fever. [MS]      ED Course User Index  [MS] Franky Ny MD                  AS OF 14:14 EDT VITALS:    BP - 101/56  HR - 58  TEMP - (!) 100.6 °F (38.1 °C) (Oral)  O2 SATS - 96%                  DIAGNOSIS  Final diagnoses:   Acute febrile illness   History of urologic surgery   Leukopenia, unspecified type         DISPOSITION  Admission      Please note that portions of this document were completed with voice recognition software.        Franky Ny MD  06/10/23 2043

## 2023-06-11 LAB
ALBUMIN SERPL-MCNC: 3.7 G/DL (ref 3.5–5.2)
ALP SERPL-CCNC: 106 U/L (ref 39–117)
ALT SERPL W P-5'-P-CCNC: 17 U/L (ref 1–33)
ANION GAP SERPL CALCULATED.3IONS-SCNC: 7 MMOL/L (ref 5–15)
AST SERPL-CCNC: 31 U/L (ref 1–32)
BACTERIA SPEC AEROBE CULT: NO GROWTH
BASOPHILS # BLD MANUAL: 0 10*3/MM3 (ref 0–0.2)
BASOPHILS NFR BLD MANUAL: 0 % (ref 0–1.5)
BILIRUB CONJ SERPL-MCNC: <0.2 MG/DL (ref 0–0.3)
BILIRUB INDIRECT SERPL-MCNC: NORMAL MG/DL
BILIRUB SERPL-MCNC: 0.3 MG/DL (ref 0–1.2)
BUN SERPL-MCNC: 9 MG/DL (ref 6–20)
BUN/CREAT SERPL: 14.3 (ref 7–25)
CALCIUM SPEC-SCNC: 8.6 MG/DL (ref 8.6–10.5)
CHLORIDE SERPL-SCNC: 102 MMOL/L (ref 98–107)
CO2 SERPL-SCNC: 28 MMOL/L (ref 22–29)
CREAT SERPL-MCNC: 0.63 MG/DL (ref 0.57–1)
DEPRECATED RDW RBC AUTO: 45.1 FL (ref 37–54)
EGFRCR SERPLBLD CKD-EPI 2021: 105.6 ML/MIN/1.73
EOSINOPHIL # BLD MANUAL: 0 10*3/MM3 (ref 0–0.4)
EOSINOPHIL NFR BLD MANUAL: 0 % (ref 0.3–6.2)
ERYTHROCYTE [DISTWIDTH] IN BLOOD BY AUTOMATED COUNT: 13.5 % (ref 12.3–15.4)
GLUCOSE SERPL-MCNC: 103 MG/DL (ref 65–99)
HCT VFR BLD AUTO: 35.6 % (ref 34–46.6)
HGB BLD-MCNC: 11.1 G/DL (ref 12–15.9)
LYMPHOCYTES # BLD MANUAL: 0.56 10*3/MM3 (ref 0.7–3.1)
LYMPHOCYTES NFR BLD MANUAL: 5 % (ref 5–12)
MCH RBC QN AUTO: 28.2 PG (ref 26.6–33)
MCHC RBC AUTO-ENTMCNC: 31.2 G/DL (ref 31.5–35.7)
MCV RBC AUTO: 90.6 FL (ref 79–97)
METAMYELOCYTES NFR BLD MANUAL: 1 % (ref 0–0)
MONOCYTES # BLD: 0.1 10*3/MM3 (ref 0.1–0.9)
NEUTROPHILS # BLD AUTO: 1.33 10*3/MM3 (ref 1.7–7)
NEUTROPHILS NFR BLD MANUAL: 36 % (ref 42.7–76)
NEUTS BAND NFR BLD MANUAL: 30 % (ref 0–5)
PLAT MORPH BLD: NORMAL
PLATELET # BLD AUTO: 209 10*3/MM3 (ref 140–450)
PMV BLD AUTO: 9.5 FL (ref 6–12)
POTASSIUM SERPL-SCNC: 3.8 MMOL/L (ref 3.5–5.2)
PROT SERPL-MCNC: 6.1 G/DL (ref 6–8.5)
RBC # BLD AUTO: 3.93 10*6/MM3 (ref 3.77–5.28)
RBC MORPH BLD: NORMAL
SODIUM SERPL-SCNC: 137 MMOL/L (ref 136–145)
VARIANT LYMPHS NFR BLD MANUAL: 28 % (ref 19.6–45.3)
WBC MORPH BLD: NORMAL
WBC NRBC COR # BLD: 2.01 10*3/MM3 (ref 3.4–10.8)

## 2023-06-11 PROCEDURE — G0378 HOSPITAL OBSERVATION PER HR: HCPCS

## 2023-06-11 PROCEDURE — 80048 BASIC METABOLIC PNL TOTAL CA: CPT | Performed by: INTERNAL MEDICINE

## 2023-06-11 PROCEDURE — 25010000002 ENOXAPARIN PER 10 MG: Performed by: INTERNAL MEDICINE

## 2023-06-11 PROCEDURE — 25010000002 PROMETHAZINE PER 50 MG: Performed by: INTERNAL MEDICINE

## 2023-06-11 PROCEDURE — 85007 BL SMEAR W/DIFF WBC COUNT: CPT | Performed by: INTERNAL MEDICINE

## 2023-06-11 PROCEDURE — 99222 1ST HOSP IP/OBS MODERATE 55: CPT | Performed by: UROLOGY

## 2023-06-11 PROCEDURE — 80076 HEPATIC FUNCTION PANEL: CPT | Performed by: INTERNAL MEDICINE

## 2023-06-11 PROCEDURE — 85025 COMPLETE CBC W/AUTO DIFF WBC: CPT | Performed by: INTERNAL MEDICINE

## 2023-06-11 PROCEDURE — 25010000002 CEFTRIAXONE PER 250 MG: Performed by: INTERNAL MEDICINE

## 2023-06-11 RX ORDER — SODIUM CHLORIDE 9 MG/ML
100 INJECTION, SOLUTION INTRAVENOUS CONTINUOUS
Status: DISCONTINUED | OUTPATIENT
Start: 2023-06-11 | End: 2023-06-14 | Stop reason: HOSPADM

## 2023-06-11 RX ORDER — ZOLPIDEM TARTRATE 5 MG/1
5 TABLET ORAL NIGHTLY PRN
Status: DISCONTINUED | OUTPATIENT
Start: 2023-06-11 | End: 2023-06-14 | Stop reason: HOSPADM

## 2023-06-11 RX ADMIN — ACETAMINOPHEN 650 MG: 325 TABLET ORAL at 16:48

## 2023-06-11 RX ADMIN — PROMETHAZINE HYDROCHLORIDE 12.5 MG: 25 INJECTION INTRAMUSCULAR; INTRAVENOUS at 13:26

## 2023-06-11 RX ADMIN — ZOLPIDEM TARTRATE 5 MG: 5 TABLET ORAL at 20:44

## 2023-06-11 RX ADMIN — SODIUM CHLORIDE 500 ML: 9 INJECTION, SOLUTION INTRAVENOUS at 08:28

## 2023-06-11 RX ADMIN — ENOXAPARIN SODIUM 40 MG: 100 INJECTION SUBCUTANEOUS at 08:20

## 2023-06-11 RX ADMIN — ACETAMINOPHEN 650 MG: 325 TABLET ORAL at 20:44

## 2023-06-11 RX ADMIN — Medication 10 MG: at 01:39

## 2023-06-11 RX ADMIN — ACETAMINOPHEN 650 MG: 325 TABLET ORAL at 08:20

## 2023-06-11 RX ADMIN — ACETAMINOPHEN 650 MG: 325 TABLET ORAL at 12:25

## 2023-06-11 RX ADMIN — LEVOTHYROXINE SODIUM 75 MCG: 75 TABLET ORAL at 06:07

## 2023-06-11 RX ADMIN — ACETAMINOPHEN 650 MG: 325 TABLET ORAL at 01:38

## 2023-06-11 RX ADMIN — SENNOSIDES AND DOCUSATE SODIUM 2 TABLET: 50; 8.6 TABLET ORAL at 08:20

## 2023-06-11 RX ADMIN — SODIUM CHLORIDE 1 G: 900 INJECTION INTRAVENOUS at 11:22

## 2023-06-11 RX ADMIN — SODIUM CHLORIDE 100 ML/HR: 9 INJECTION, SOLUTION INTRAVENOUS at 22:50

## 2023-06-11 RX ADMIN — SODIUM CHLORIDE 100 ML/HR: 9 INJECTION, SOLUTION INTRAVENOUS at 08:29

## 2023-06-11 NOTE — PROGRESS NOTES
[facility] Medicine Services  PROGRESS NOTE    Patient Name: Gypsy Mann  : 1970  MRN: [id number]    Date of Admission: 6/10/2023  Primary Care Physician: Genaro Casey MD    Subjective   Subjective     CC:  Fever, leukopenia, nausea    HPI:  Overnight nausea (no diarrhea) mild abdominal bloating but no overt abd pain. No dyspnea. No chest pain. No sore throat    ROS:  Mild headache  No chest pain  No plapitations  No rash  No dysuria    Objective   Objective     Vital Signs:   Temp:  [97.8 °F (36.6 °C)-100.6 °F (38.1 °C)] 100.2 °F (37.9 °C)  Heart Rate:  [] 86  Resp:  [14-20] 14  BP: ()/(54-65) 102/54     Physical Exam:  Constitutional:Alert, oriented x 3, nontoxic appearing  Psych:Normal/appropriate affect  HEENT:NCAT, oropharynx clear  Neck: neck supple, full range of motion  Neuro: Face symmetric, speech clear, equal , moves all extremities  Cardiac: RRR; No pretibial pitting edema  Resp: CTAB, normal effort, no cva tenderness  GI: abd soft, nontender  Skin: No extremity rash  Musculoskeletal/extremities: no cyanosis of extremities; no significant ankle edema      Results Reviewed:  LAB RESULTS:      Lab 23  0643 06/10/23  1022   WBC 2.01* 2.81*   HEMOGLOBIN 11.1* 11.3*   HEMATOCRIT 35.6 36.1   PLATELETS 209 267   NEUTROS ABS 1.33* 1.90   IMMATURE GRANS (ABS)  --  0.01   LYMPHS ABS  --  0.69*   MONOS ABS  --  0.18   EOS ABS 0.00 0.01   MCV 90.6 89.4   PROCALCITONIN  --  0.14   LACTATE  --  1.9         Lab 23  0643 06/10/23  1022   SODIUM 137 138   POTASSIUM 3.8 3.8   CHLORIDE 102 103   CO2 28.0 23.0   ANION GAP 7.0 12.0   BUN 9 8   CREATININE 0.63 0.63   EGFR 105.6 105.6   GLUCOSE 103* 102*   CALCIUM 8.6 9.0         Lab 23  0643 06/10/23  1022   TOTAL PROTEIN 6.1 7.1   ALBUMIN 3.7 4.1   GLOBULIN  --  3.0   ALT (SGPT) 17 9   AST (SGOT) 31 18   BILIRUBIN 0.3 0.2   BILIRUBIN DIRECT <0.2  --    ALK PHOS 106 105   LIPASE  --  37                      Brief Urine Lab Results  (Last result in the past 365 days)        Color   Clarity   Blood   Leuk Est   Nitrite   Protein   CREAT   Urine HCG        06/10/23 1023 Yellow   Clear   Negative   Negative   Negative   Negative                   Microbiology Results Abnormal       Procedure Component Value - Date/Time    COVID PRE-OP / PRE-PROCEDURE SCREENING ORDER (NO ISOLATION) - Swab, Nasopharynx [[id number]]  (Normal) Collected: 06/10/23 1212    Lab Status: Final result Specimen: Swab from Nasopharynx Updated: 06/10/23 1318    Narrative:      The following orders were created for panel order COVID PRE-OP / PRE-PROCEDURE SCREENING ORDER (NO ISOLATION) - Swab, Nasopharynx.  Procedure                               Abnormality         Status                     ---------                               -----------         ------                     Respiratory Panel PCR w/...[[id number]]  Normal              Final result                 Please view results for these tests on the individual orders.    Respiratory Panel PCR w/COVID-19(SARS-CoV-2) TOO/[facility] In-House, NP Swab in UTM/VTM, 3-4 HR TAT - Swab, Nasopharynx [[id number]]  (Normal) Collected: 06/10/23 1212    Lab Status: Final result Specimen: Swab from Nasopharynx Updated: 06/10/23 1318     ADENOVIRUS, PCR Not Detected     Coronavirus 229E Not Detected     Coronavirus HKU1 Not Detected     Coronavirus NL63 Not Detected     Coronavirus OC43 Not Detected     COVID19 Not Detected     Human Metapneumovirus Not Detected     Human Rhinovirus/Enterovirus Not Detected     Influenza A PCR Not Detected     Influenza B PCR Not Detected     Parainfluenza Virus 1 Not Detected     Parainfluenza Virus 2 Not Detected     Parainfluenza Virus 3 Not Detected     Parainfluenza Virus 4 Not Detected     RSV, PCR Not Detected     Bordetella pertussis pcr Not Detected     Bordetella parapertussis PCR Not Detected     Chlamydophila pneumoniae PCR Not Detected      Mycoplasma pneumo by PCR Not Detected    Narrative:      In the setting of a positive respiratory panel with a viral infection PLUS a negative procalcitonin without other underlying concern for bacterial infection, consider observing off antibiotics or discontinuation of antibiotics and continue supportive care. If the respiratory panel is positive for atypical bacterial infection (Bordetella pertussis, Chlamydophila pneumoniae, or Mycoplasma pneumoniae), consider antibiotic de-escalation to target atypical bacterial infection.            CT Abdomen Pelvis Without Contrast    Result Date: 6/10/2023  CT ABDOMEN PELVIS WO CONTRAST Date of Exam: 6/10/2023 10:49 AM EDT Indication: right flank pain, fever, recent stent Comparison: KUB 5/24/2023, outside CT abdomen/pelvis 5/22/2023 Technique: Axial CT images were obtained of the abdomen and pelvis without the administration of contrast. Reconstructed coronal and sagittal images were also obtained. Automated exposure control and iterative construction methods were used. Findings: Included lung bases are clear. No pleural effusion. Previously seen distal right ureteral calculus is no longer visualized, and there has been interval resolution of previously seen right hydronephrosis compared to 5/22/2023 CT with only mild residual dilatation of the right renal pelvis. There is mild right perinephric stranding, which also appears decreased. No nephroureterolithiasis. Adrenal glands, spleen, pancreas, liver, gallbladder appear unremarkable for noncontrast CT. No abnormal bowel distention is seen. Appendix is not visualized, but no right lower quadrant inflammation is noted. There are no urinary bladder calcifications.  Urinary bladder is incompletely distended. No significant free fluid or lymphadenopathy is noted. No acute osseous abnormality is identified. There is leftward curvature of the lumbar spine.     Impression: Impression: Resolution of previously seen right ureteral  calculus and right hydronephrosis. Mild right perinephric stranding, decreased compared to 5/22/2023 CT. Electronically Signed: Honey Pro  6/10/2023 11:05 AM EDT  Workstation ID: [id number]    XR Chest 1 View    Result Date: 6/10/2023  XR CHEST 1 VW Date of Exam: 6/10/2023 11:58 AM EDT Indication: Fever Comparison: CT abdomen and pelvis 6/10/2023. No previous chest imaging available for comparison. Findings: Lungs are well expanded and clear. No consolidation or large pleural effusion is seen. Cardiomediastinal contours appear within normal limits. There is rightward curvature of the thoracic spine.     Impression: Impression: No acute cardiopulmonary abnormality is identified. Electronically Signed: Honey Rivasley  6/10/2023 12:25 PM EDT  Workstation ID: [id number]         Current medications:  Scheduled Meds:cefTRIAXone, 1 g, Intravenous, Q24H  enoxaparin, 40 mg, Subcutaneous, Daily  levothyroxine, 75 mcg, Oral, Q AM  senna-docusate sodium, 2 tablet, Oral, BID  sodium chloride, 500 mL, Intravenous, Once  sodium chloride, 10 mL, Intravenous, Q12H      Continuous Infusions:sodium chloride, 100 mL/hr, Last Rate: 100 mL/hr (06/11/23 0829)      PRN Meds:.  acetaminophen    senna-docusate sodium **AND** polyethylene glycol **AND** bisacodyl **AND** bisacodyl    Calcium Replacement - Follow Nurse / BPA Driven Protocol    HYDROcodone-acetaminophen    Magnesium Standard Dose Replacement - Follow Nurse / BPA Driven Protocol    melatonin    ondansetron    Phosphorus Replacement - Follow Nurse / BPA Driven Protocol    Potassium Replacement - Follow Nurse / BPA Driven Protocol    promethazine    Sodium Chloride (PF)    sodium chloride    sodium chloride    Assessment & Plan   Assessment & Plan     Active Hospital Problems    Diagnosis  POA    **Acute febrile illness [R50.9]  Yes    Ureteral stone [N20.1]  Yes      Resolved Hospital Problems   No resolved problems to display.        Brief Hospital Course to date:  Gypsy LONG  "Mackenzie is a 53 y.o. female hypothyroidism, lumbar radiculopathy, insomnia, and kidney stones. Patient underwent recent right ureteral stone with hydronephrosis s/p urethral dilation, right ureteral stent placement and retrograde pyelogram with Dr. Pascual on 5/24. She was discharged on 10 days of Bactrim. She underwent laser lithotripsy with ureteral stent stent exchange on 6/5 and placed on several days of macrobid. She states she removed the stent herself on 6/7 and completed her macrobid on 6/9 when she began having fevers up to 102 mild abdominal \"bloating\" similar to symptoms she had when initially diagnosed w/ kidney stones. She called Urology office who advised her to go and get checked in the ED on 6/10. In ED wbc was 2.81. otherwise had normal labs including lipase. U/a benign. Resp pcr panel negative. Ct a/p revealed interval resolution of previously seen right ureteral calculus and hydronephrosis w/ mild right perinephric stranding (decreased from 5/22/23 scan).     Fever w/ Leukopenia  Recent Urologic Procedures   - UA benign, blood cultures pending, Resp PCR negative, cxr negative, procal negative  - CT scan reviewed, actually appears improved from previous, no further ureteral calculus or hydro, perinephric stranding also improved  - stent removed by patient on Wednesday (follows w/ Dr. Pascual); ED made Dr. Mccarthy aware, will consult formally as still w/ abdominal symptoms  - possibly induced by abx use (macrobid, Bactrim), currently holding  -initiated on empiric rocephin at admission (held bactrim and macrobid)  -6/11/23: 24 hr tmax 100.2. had nausea overnight and only wanting clears. Wbc down slightly to 2.01 today (from 2.81); continue empiric rocephin, clears, prn zofran, iv fluids. Continue to monitor temp curve, cultures (negative thus far), monitor wbc count.  Depending on clinical course consider ID consult (if still febrile or cultures turn positive) or Heme consult (if wbc continues " to downtrend). If counts improve, remains afebrile and subjectively feeling better then possibly home soon  -Urology following: I discussed w/ Dr. Mccarthy on 6/11. He sees no urology abnormality currently, formal note to follow    Hypothyroidism:  - continue levothyroxine     Chronic Pain:  - continue home pain medications     Am labs: cbc, cmp (follow blood cultures which are negative thus far)    Expected Discharge Location and Transportation: home  Expected Discharge ? 6/12/23 depending on clinical course      DVT prophylaxis:  Medical DVT prophylaxis orders are present.     AM-PAC 6 Clicks Score (PT): 24 (06/10/23 2000)    CODE STATUS:   Code Status and Medical Interventions:   Ordered at: 06/10/23 1430     Level Of Support Discussed With:    Patient     Code Status (Patient has no pulse and is not breathing):    CPR (Attempt to Resuscitate)     Medical Interventions (Patient has pulse or is breathing):    Full Support       Ata Mcguire MD  06/11/23

## 2023-06-11 NOTE — PLAN OF CARE
Problem: Adult Inpatient Plan of Care  Goal: Plan of Care Review  Outcome: Ongoing, Progressing  Flowsheets (Taken 6/11/2023 0552)  Outcome Evaluation: VSS. RA. PRN Tylenol and Phenergen given for HA and nausea during the shift. No fever noted during the shift. Pt still complaining of abdominal pain/weakness. IV abx infused per order. No other concerns at this tme. Will continue with the plan of care.   Goal Outcome Evaluation:              Outcome Evaluation: VSS. RA. PRN Tylenol and Phenergen given for HA and nausea during the shift. No fever noted during the shift. Pt still complaining of abdominal pain/weakness. IV abx infused per order. No other concerns at this tme. Will continue with the plan of care.

## 2023-06-11 NOTE — CONSULTS
[facility] Consult Note     Date of Consult: 06/10/2023     Patient Name: Gypsy Mann  MRN: [id number]   : 1970     Referring Provider: * No referring provider recorded for this case *    Care Team: Patient Care Team:  Genaro Casey MD as PCP - General  Genaro Casey MD as PCP - Family Medicine     Reason for Hospitalization: Leukopenia/fever    History of Present Illness: Was contacted for hospital consultation on Gypsy Mann a 53 y.o. female who presents to the hospital for abdominal pain and fever.   She underwent urgent stent placement for obstructing stone on  and was discharged with 10 days of bactrim.  She subsequently underwent ureteroscopy and laser lithotripsy on  and was discharged home on Macrobid.  She pulled her stent on Wednesday, three days, after her procedure.  On Saturday she started having abdominal bloating and fever up to 102.  She also reports fatigue.  No nausea or vomiting.  No hematuria or dysuria.    Today she states she has mild nausea but her bloating has improved.      I reviewed her CT scan which does not show any obstructing stones, hydronephrosis, or concerning perinephric stranding.     Subjective      Pertinent items are noted in HPI.     Past Medical History:   Past Medical History:   Diagnosis Date    Back pain     Bronchitis     Hypothyroid     Insomnia     Kidney stones     Lumbosacral disc disease Aug 2022       Past Surgical History:   Past Surgical History:   Procedure Laterality Date    BACK SURGERY  2022    L5/S1 Diskectomy    BILATERAL BREAST REDUCTION Bilateral 10/31/2019    Procedure: BREAST REDUCTION BILATERAL;  Surgeon: Francis Galvan MD;  Location: [facility];  Service: Plastics     SECTION      x two    COLONOSCOPY      CYSTOSCOPY, URETEROSCOPY, RETROGRADE PYELOGRAM, STONE EXTRACTION, STENT INSERTION Right 2023    Procedure: CYSTOSCOPY RIGHT RETROGRADE PYELOGRAM AND RIGHT URETERAL STENT PLACEMENT;   Surgeon: Parag Pascual MD;  Location:  [facility];  Service: Urology;  Laterality: Right;    EPIDURAL BLOCK  8-24-22 and 9-28-22    LUMBAR DISCECTOMY Right 12/30/2022    Procedure: LUMBAR DISCECTOMY L5-S1 RIGHT;  Surgeon: Yared Zapien MD;  Location:  [facility];  Service: Neurosurgery;  Laterality: Right;    REDUCTION MAMMAPLASTY Bilateral     2020    URETEROSCOPY LASER LITHOTRIPSY WITH STENT INSERTION Right 6/5/2023    Procedure: URETEROSCOPY LASER LITHOTRIPSY WITH STENT INSERTION - RIGHT;  Surgeon: Parag Pascual MD;  Location:  [facility];  Service: Urology;  Laterality: Right;       Family History:   Family History   Problem Relation Age of Onset    Cancer Mother         Lung/Brain    Thyroid disease Mother     Hypertension Father     Heart disease Father     Emphysema Father     COPD Father     Hyperlipidemia Father     Cancer Maternal Grandmother         Lung    Diabetes Son     Breast cancer Neg Hx     Ovarian cancer Neg Hx        Social History:   Social History     Socioeconomic History    Marital status:    Tobacco Use    Smoking status: Never    Smokeless tobacco: Never   Vaping Use    Vaping Use: Never used   Substance and Sexual Activity    Alcohol use: No    Drug use: No    Sexual activity: Defer     Partners: Male     Birth control/protection: Surgical       Medications:     Current Facility-Administered Medications:     acetaminophen (TYLENOL) tablet 650 mg, 650 mg, Oral, Q4H PRN, Petrona Asher DO, 650 mg at 06/11/23 1225    sennosides-docusate (PERICOLACE) 8.6-50 MG per tablet 2 tablet, 2 tablet, Oral, BID, 2 tablet at 06/11/23 0820 **AND** polyethylene glycol (MIRALAX) packet 17 g, 17 g, Oral, Daily PRN **AND** bisacodyl (DULCOLAX) EC tablet 5 mg, 5 mg, Oral, Daily PRN **AND** bisacodyl (DULCOLAX) suppository 10 mg, 10 mg, Rectal, Daily PRN, Petrona Asher DO    Calcium Replacement - Follow Nurse / BPA Driven Protocol, , Does not apply, PRN, Petrona Asher DO     cefTRIAXone (ROCEPHIN) 1 g/100 mL 0.9% NS (MBP), 1 g, Intravenous, Q24H, Petrona Asher DO, 1 g at 06/11/23 1122    Enoxaparin Sodium (LOVENOX) syringe 40 mg, 40 mg, Subcutaneous, Daily, Petrona Asher DO, 40 mg at 06/11/23 0820    HYDROcodone-acetaminophen (NORCO) 5-325 MG per tablet 1 tablet, 1 tablet, Oral, Q6H PRN, Petrona Asher DO    levothyroxine (SYNTHROID, LEVOTHROID) tablet 75 mcg, 75 mcg, Oral, Q AM, Petrona Asher DO, 75 mcg at 06/11/23 0607    Magnesium Standard Dose Replacement - Follow Nurse / BPA Driven Protocol, , Does not apply, PRN, Petrona Asher DO    melatonin tablet 10 mg, 10 mg, Oral, Nightly PRN, Willow Muniz, APRN, 10 mg at 06/11/23 0139    ondansetron (ZOFRAN) injection 4 mg, 4 mg, Intravenous, Q6H PRN, Petrona Asher DO    Phosphorus Replacement - Follow Nurse / BPA Driven Protocol, , Does not apply, PRN, Petrona Asher DO    Potassium Replacement - Follow Nurse / BPA Driven Protocol, , Does not apply, PRN, Petrona Asher DO    promethazine (PHENERGAN) 12.5 mg in sodium chloride 0.9 % 50 mL, 12.5 mg, Intravenous, Q6H PRN, Petrona Asher DO, Last Rate: 0 mL/hr at 06/10/23 1135, 12.5 mg at 06/11/23 1326    Sodium Chloride (PF) 0.9 % 10 mL, 10 mL, Intravenous, PRN, Petrona Asher, DO, 10 mL at 06/10/23 1146    sodium chloride 0.9 % flush 10 mL, 10 mL, Intravenous, Q12H, Petrona Asher, , 10 mL at 06/10/23 2008    sodium chloride 0.9 % flush 10 mL, 10 mL, Intravenous, PRN, Petrona Asher,     sodium chloride 0.9 % infusion 40 mL, 40 mL, Intravenous, PRN, Petroan Asher DO    sodium chloride 0.9 % infusion, 100 mL/hr, Intravenous, Continuous, Ata Mcguire MD, Last Rate: 100 mL/hr at 06/11/23 0829, 100 mL/hr at 06/11/23 0829    zolpidem (AMBIEN) tablet 5 mg, 5 mg, Oral, Nightly PRN, Ata Mcguire MD    Allergies:   Allergies   Allergen Reactions    Zofran [Ondansetron] Headache       Problem:   Patient Active Problem List    Diagnosis    Breast hypertrophy    Cough    Acute bronchitis    Costochondritis, acute    Lumbar disc herniation with radiculopathy    Ureteral stone    Acute febrile illness       Review of Systems:   Review of Systems   Constitutional:  Positive for chills and fever. Negative for fatigue and unexpected weight change.   HENT:  Negative for congestion, rhinorrhea and sore throat.    Eyes:  Negative for visual disturbance.   Respiratory:  Negative for apnea, cough, chest tightness and shortness of breath.    Cardiovascular:  Negative for chest pain.   Gastrointestinal:  Positive for abdominal pain. Negative for constipation, diarrhea, nausea and vomiting.   Endocrine: Negative for polydipsia and polyuria.   Genitourinary:  Negative for difficulty urinating, dysuria, enuresis, flank pain, frequency, genital sores, hematuria and urgency.   Musculoskeletal:  Negative for gait problem.   Skin:  Negative for rash and wound.   Allergic/Immunologic: Negative for immunocompromised state.   Neurological:  Negative for dizziness, tremors, syncope, weakness and light-headedness.   Hematological:  Does not bruise/bleed easily.       Objective     Physical Exam:  Vital Signs:   Temp:  [97.8 °F (36.6 °C)-100.6 °F (38.1 °C)] 98.7 °F (37.1 °C)  Heart Rate:  [] 79  Resp:  [14-18] 16  BP: ()/(54-64) 105/59  63.5 kg (140 lb)  Body mass index is 24.8 kg/m².     Physical Exam  Vitals and nursing note reviewed. Exam conducted with a chaperone present.   Constitutional:       General: She is awake. She is not in acute distress.     Appearance: Normal appearance.   HENT:      Head: Normocephalic and atraumatic.      Right Ear: External ear normal.      Left Ear: External ear normal.      Nose: Nose normal.   Eyes:      Conjunctiva/sclera: Conjunctivae normal.   Pulmonary:      Effort: Pulmonary effort is normal. No respiratory distress.   Abdominal:      General: Abdomen is flat. There is no distension.      Palpations:  Abdomen is soft. There is no mass.      Tenderness: There is no abdominal tenderness. There is no right CVA tenderness, left CVA tenderness, guarding or rebound.      Hernia: No hernia is present.   Genitourinary:     Exam position: Lithotomy position.   Skin:     General: Skin is warm.   Neurological:      General: No focal deficit present.      Mental Status: She is alert and oriented to person, place, and time.      Gait: Gait normal.   Psychiatric:         Behavior: Behavior normal. Behavior is cooperative.         Thought Content: Thought content normal.         Judgment: Judgment normal.         I/O last 3 completed shifts:  In: 340 [P.O.:240; IV Piggyback:100]  Out: 400 [Urine:400]    Labs  Lab Results   Component Value Date    GLUCOSE 103 (H) 06/11/2023    CALCIUM 8.6 06/11/2023     06/11/2023    K 3.8 06/11/2023    CO2 28.0 06/11/2023     06/11/2023    BUN 9 06/11/2023    CREATININE 0.63 06/11/2023    EGFRIFNONA 89 10/25/2019    BCR 14.3 06/11/2023    ANIONGAP 7.0 06/11/2023       Lab Results   Component Value Date    WBC 2.01 (L) 06/11/2023    HGB 11.1 (L) 06/11/2023    HCT 35.6 06/11/2023    MCV 90.6 06/11/2023     06/11/2023       Urine Culture   Date Value Ref Range Status   06/10/2023 No growth  Final       Brief Urine Lab Results  (Last result in the past 365 days)        Color   Clarity   Blood   Leuk Est   Nitrite   Protein   CREAT   Urine HCG        06/10/23 1023 Yellow   Clear   Negative   Negative   Negative   Negative                   Radiographic Studies  CT Abdomen Pelvis Without Contrast    Result Date: 6/10/2023  Impression: Resolution of previously seen right ureteral calculus and right hydronephrosis. Mild right perinephric stranding, decreased compared to 5/22/2023 CT. Electronically Signed: Honey Pro  6/10/2023 11:05 AM EDT  Workstation ID: [id number]    XR Chest 1 View    Result Date: 6/10/2023  Impression: No acute cardiopulmonary abnormality is identified.  Electronically Signed: Honey Pro  6/10/2023 12:25 PM EDT  Workstation ID: [id number]    XR Abdomen KUB    Result Date: 5/24/2023  Impression: 5 mm radiopaque stone overlies the right sacrum. This is unchanged position compared to the CT study from 5/22/2023. It causes severe hydronephrosis on CT exam. Moderate stool burden. Question constipation. Electronically Signed: Alanna Hyatt  5/24/2023 8:10 AM EDT  Workstation ID: [id number]     Results Review:   I reviewed the patient's new clinical results.     Imaging Results (Last 72 Hours)       Procedure Component Value Units Date/Time    XR Chest 1 View [[id number]] Collected: 06/10/23 1223     Updated: 06/10/23 1228    Narrative:      XR CHEST 1 VW    Date of Exam: 6/10/2023 11:58 AM EDT    Indication: Fever    Comparison: CT abdomen and pelvis 6/10/2023. No previous chest imaging available for comparison.    Findings:  Lungs are well expanded and clear. No consolidation or large pleural effusion is seen. Cardiomediastinal contours appear within normal limits. There is rightward curvature of the thoracic spine.      Impression:      Impression:  No acute cardiopulmonary abnormality is identified.      Electronically Signed: Honey Pro    6/10/2023 12:25 PM EDT    Workstation ID: [id number]    CT Abdomen Pelvis Without Contrast [[id number]] Collected: 06/10/23 1100     Updated: 06/10/23 1108    Narrative:      CT ABDOMEN PELVIS WO CONTRAST    Date of Exam: 6/10/2023 10:49 AM EDT    Indication: right flank pain, fever, recent stent    Comparison: KUB 5/24/2023, outside CT abdomen/pelvis 5/22/2023    Technique: Axial CT images were obtained of the abdomen and pelvis without the administration of contrast. Reconstructed coronal and sagittal images were also obtained. Automated exposure control and iterative construction methods were used.    Findings:  Included lung bases are clear. No pleural effusion.    Previously seen distal right ureteral calculus is no longer  visualized, and there has been interval resolution of previously seen right hydronephrosis compared to 5/22/2023 CT with only mild residual dilatation of the right renal pelvis. There is mild   right perinephric stranding, which also appears decreased. No nephroureterolithiasis.    Adrenal glands, spleen, pancreas, liver, gallbladder appear unremarkable for noncontrast CT. No abnormal bowel distention is seen. Appendix is not visualized, but no right lower quadrant inflammation is noted. There are no urinary bladder calcifications.   Urinary bladder is incompletely distended. No significant free fluid or lymphadenopathy is noted. No acute osseous abnormality is identified. There is leftward curvature of the lumbar spine.      Impression:      Impression:  Resolution of previously seen right ureteral calculus and right hydronephrosis. Mild right perinephric stranding, decreased compared to 5/22/2023 CT.            Electronically Signed: Honey Pro    6/10/2023 11:05 AM EDT    Workstation ID: [id number]            Assessment / Plan      Assessment/Plan: Ms. Mann is a 54 yo female who underwent ureteroscopy and laser lithotripsy on Monday, pulled stent on Wednesday, and returns yesterday with fever and abdominal bloating.  She was found to have a WBC of 2.8.  I do not have a urologic reason for her symptoms at this time.  It is possible this is a drug side effect as she was on Macrobid which can cause leukopenia and fever.       No urologic surgical intervention indicated  Agree with continuing Rocephin for now until cultures resulted  Would consider ID and/or hematology consult if WBC does not improve in the next 24 hrs or so       I discussed the patients findings and my recommendations with the patient.     Time: Total face-to-face/floor time. 60 min    Nola Mccarthy MD   06/11/23  13:33 EDT

## 2023-06-12 LAB
ALBUMIN SERPL-MCNC: 3.2 G/DL (ref 3.5–5.2)
ALBUMIN/GLOB SERPL: 1.7 G/DL
ALP SERPL-CCNC: 92 U/L (ref 39–117)
ALT SERPL W P-5'-P-CCNC: 24 U/L (ref 1–33)
ANION GAP SERPL CALCULATED.3IONS-SCNC: 9 MMOL/L (ref 5–15)
AST SERPL-CCNC: 49 U/L (ref 1–32)
BASOPHILS # BLD MANUAL: 0.04 10*3/MM3 (ref 0–0.2)
BASOPHILS NFR BLD MANUAL: 2 % (ref 0–1.5)
BILIRUB SERPL-MCNC: 0.3 MG/DL (ref 0–1.2)
BUN SERPL-MCNC: 6 MG/DL (ref 6–20)
BUN/CREAT SERPL: 10.3 (ref 7–25)
CALCIUM SPEC-SCNC: 7.7 MG/DL (ref 8.6–10.5)
CHLORIDE SERPL-SCNC: 106 MMOL/L (ref 98–107)
CO2 SERPL-SCNC: 22 MMOL/L (ref 22–29)
CREAT SERPL-MCNC: 0.58 MG/DL (ref 0.57–1)
DEPRECATED RDW RBC AUTO: 43 FL (ref 37–54)
EGFRCR SERPLBLD CKD-EPI 2021: 107.7 ML/MIN/1.73
EOSINOPHIL # BLD MANUAL: 0 10*3/MM3 (ref 0–0.4)
EOSINOPHIL NFR BLD MANUAL: 0 % (ref 0.3–6.2)
ERYTHROCYTE [DISTWIDTH] IN BLOOD BY AUTOMATED COUNT: 13.5 % (ref 12.3–15.4)
GLOBULIN UR ELPH-MCNC: 1.9 GM/DL
GLUCOSE SERPL-MCNC: 106 MG/DL (ref 65–99)
HCT VFR BLD AUTO: 29.7 % (ref 34–46.6)
HGB BLD-MCNC: 9.4 G/DL (ref 12–15.9)
LYMPHOCYTES # BLD MANUAL: 0.35 10*3/MM3 (ref 0.7–3.1)
LYMPHOCYTES NFR BLD MANUAL: 2 % (ref 5–12)
MCH RBC QN AUTO: 27.6 PG (ref 26.6–33)
MCHC RBC AUTO-ENTMCNC: 31.6 G/DL (ref 31.5–35.7)
MCV RBC AUTO: 87.1 FL (ref 79–97)
MONOCYTES # BLD: 0.04 10*3/MM3 (ref 0.1–0.9)
NEUTROPHILS # BLD AUTO: 1.54 10*3/MM3 (ref 1.7–7)
NEUTROPHILS NFR BLD MANUAL: 60 % (ref 42.7–76)
NEUTS BAND NFR BLD MANUAL: 18 % (ref 0–5)
PLAT MORPH BLD: NORMAL
PLATELET # BLD AUTO: 160 10*3/MM3 (ref 140–450)
PMV BLD AUTO: 9.1 FL (ref 6–12)
POTASSIUM SERPL-SCNC: 3.2 MMOL/L (ref 3.5–5.2)
POTASSIUM SERPL-SCNC: 3.7 MMOL/L (ref 3.5–5.2)
PROT SERPL-MCNC: 5.1 G/DL (ref 6–8.5)
RBC # BLD AUTO: 3.41 10*6/MM3 (ref 3.77–5.28)
RBC MORPH BLD: NORMAL
SODIUM SERPL-SCNC: 137 MMOL/L (ref 136–145)
VARIANT LYMPHS NFR BLD MANUAL: 18 % (ref 19.6–45.3)
WBC MORPH BLD: NORMAL
WBC NRBC COR # BLD: 1.97 10*3/MM3 (ref 3.4–10.8)

## 2023-06-12 PROCEDURE — 99232 SBSQ HOSP IP/OBS MODERATE 35: CPT | Performed by: NURSE PRACTITIONER

## 2023-06-12 PROCEDURE — 86757 RICKETTSIA ANTIBODY: CPT | Performed by: INTERNAL MEDICINE

## 2023-06-12 PROCEDURE — 86789 WEST NILE VIRUS ANTIBODY: CPT | Performed by: INTERNAL MEDICINE

## 2023-06-12 PROCEDURE — 85007 BL SMEAR W/DIFF WBC COUNT: CPT | Performed by: INTERNAL MEDICINE

## 2023-06-12 PROCEDURE — 80053 COMPREHEN METABOLIC PANEL: CPT | Performed by: INTERNAL MEDICINE

## 2023-06-12 PROCEDURE — 87468 ANAPLSMA PHGCYTOPHLM AMP PRB: CPT | Performed by: INTERNAL MEDICINE

## 2023-06-12 PROCEDURE — 25010000002 ENOXAPARIN PER 10 MG: Performed by: INTERNAL MEDICINE

## 2023-06-12 PROCEDURE — 87484 EHRLICHA CHAFFEENSIS AMP PRB: CPT | Performed by: INTERNAL MEDICINE

## 2023-06-12 PROCEDURE — 85025 COMPLETE CBC W/AUTO DIFF WBC: CPT | Performed by: INTERNAL MEDICINE

## 2023-06-12 PROCEDURE — 84132 ASSAY OF SERUM POTASSIUM: CPT | Performed by: INTERNAL MEDICINE

## 2023-06-12 PROCEDURE — 25010000002 CEFTRIAXONE PER 250 MG: Performed by: INTERNAL MEDICINE

## 2023-06-12 PROCEDURE — 86788 WEST NILE VIRUS AB IGM: CPT | Performed by: INTERNAL MEDICINE

## 2023-06-12 RX ORDER — DOXYCYCLINE 100 MG/1
100 CAPSULE ORAL EVERY 12 HOURS SCHEDULED
Status: DISCONTINUED | OUTPATIENT
Start: 2023-06-12 | End: 2023-06-14 | Stop reason: HOSPADM

## 2023-06-12 RX ORDER — POTASSIUM CHLORIDE 20 MEQ/1
40 TABLET, EXTENDED RELEASE ORAL EVERY 4 HOURS
Status: COMPLETED | OUTPATIENT
Start: 2023-06-12 | End: 2023-06-12

## 2023-06-12 RX ADMIN — ENOXAPARIN SODIUM 40 MG: 100 INJECTION SUBCUTANEOUS at 09:07

## 2023-06-12 RX ADMIN — DOXYCYCLINE 100 MG: 100 CAPSULE ORAL at 12:32

## 2023-06-12 RX ADMIN — ACETAMINOPHEN 650 MG: 325 TABLET ORAL at 14:15

## 2023-06-12 RX ADMIN — ACETAMINOPHEN 650 MG: 325 TABLET ORAL at 09:07

## 2023-06-12 RX ADMIN — POTASSIUM CHLORIDE 40 MEQ: 1500 TABLET, EXTENDED RELEASE ORAL at 09:07

## 2023-06-12 RX ADMIN — SODIUM CHLORIDE 1 G: 900 INJECTION INTRAVENOUS at 11:37

## 2023-06-12 RX ADMIN — ZOLPIDEM TARTRATE 5 MG: 5 TABLET ORAL at 20:57

## 2023-06-12 RX ADMIN — ACETAMINOPHEN 650 MG: 325 TABLET ORAL at 18:34

## 2023-06-12 RX ADMIN — ACETAMINOPHEN 650 MG: 325 TABLET ORAL at 03:22

## 2023-06-12 RX ADMIN — ACETAMINOPHEN 650 MG: 325 TABLET ORAL at 22:21

## 2023-06-12 RX ADMIN — SENNOSIDES AND DOCUSATE SODIUM 2 TABLET: 50; 8.6 TABLET ORAL at 09:07

## 2023-06-12 RX ADMIN — LEVOTHYROXINE SODIUM 75 MCG: 75 TABLET ORAL at 05:42

## 2023-06-12 RX ADMIN — Medication 10 ML: at 20:58

## 2023-06-12 RX ADMIN — POTASSIUM CHLORIDE 40 MEQ: 1500 TABLET, EXTENDED RELEASE ORAL at 05:42

## 2023-06-12 RX ADMIN — DOXYCYCLINE 100 MG: 100 CAPSULE ORAL at 20:57

## 2023-06-12 NOTE — PLAN OF CARE
Goal Outcome Evaluation:              Outcome Evaluation: VSS.  On room air.  PRN Tylenol given for headache and low grade temp.  No complaints of nausea.  Fluids infusing.  White count 1.97. Potassium 3.2 and was replaced with 40 meq of potassium.  Will continue to follow plan of care.

## 2023-06-12 NOTE — PROGRESS NOTES
[facility]   Urology Progress Note    Patient Name: Gypsy Mann  : 1970  MRN: [id number]  Primary Care Physician:  Genaro Casey MD  Date of admission: 6/10/2023    Subjective   Subjective     Chief Complaint: Fever/abdominal pain    HPI:  Patient resting in bed. She reports mild abdominal bloating that is improving. Had bowel movement yesterday. TMAX overnight 102.5. She reports continued headache, denies hx of migraines at baseline.     WBC 1.97.   Urine Culture no growth.     Review of Systems   All systems were reviewed and negative except for: Headache, abdominal bloating, fever    Objective   Objective     Vitals:   Temp:  [98.7 °F (37.1 °C)-102.5 °F (39.2 °C)] 99.5 °F (37.5 °C)  Heart Rate:  [] 93  Resp:  [16-18] 18  BP: ()/(51-65) 104/63  Physical Exam    Constitutional: Awake in bed, alert, fever   Eyes: PERRLA, sclerae anicteric, no conjunctival injection   HENT: Normocephalic, atraumatic, mucous membranes moist   Neck: Supple, trachea midline   Respiratory: Equal chest rise, non-labored respirations    Cardiovascular: RRR   Gastrointestinal: Soft, mildly distended   Genitourinary: Voiding   Musculoskeletal: No lower extremity edema bilaterally, no clubbing or cyanosis to extremities   Psychiatric: Appropriate affect, cooperative   Neurologic: Oriented x 3,  Cranial Nerves grossly intact, speech clear   Skin: No rashes     Result Review    Result Review:  I have personally reviewed the results from the time of this admission to 2023 09:00 EDT and agree with these findings:  [x]  Laboratory  []  Microbiology  []  Radiology  []  EKG/Telemetry   []  Cardiology/Vascular   []  Pathology  []  Old records  [x]  Other: Vital signs    Most notable findings include: WBC 1.97, TMAX overnight 102.5.     Assessment & Plan   Assessment / Plan     Brief Patient Summary:  Gypsy Mann is a 53 y.o. female who is s/p ureteroscopy, laser lithotripsy on  with Dr. Pascual and  subsequently removed her ureteral stent at home who presented to [facility] on 06/10 for worsening fever and abdominal bloating.     Active Hospital Problems:  Active Hospital Problems    Diagnosis     **Acute febrile illness     Ureteral stone        Plan:   -No Urologic surgical intervention at this time.   -Planning for ID consult today.  - will be available, please call if any questions.   D/w Dr. Mccarthy.     DVT prophylaxis:  Medical DVT prophylaxis orders are present.    CODE STATUS:   Level Of Support Discussed With: Patient  Code Status (Patient has no pulse and is not breathing): CPR (Attempt to Resuscitate)  Medical Interventions (Patient has pulse or is breathing): Full Support    Disposition:  I expect patient to remain inpatient.    Electronically signed by CASS Mishra, 06/12/23, 9:00 AM EDT.

## 2023-06-12 NOTE — PROGRESS NOTES
[facility] Medicine Services  PROGRESS NOTE    Patient Name: Gypsy Mann  : 1970  MRN: [id number]    Date of Admission: 6/10/2023  Primary Care Physician: Genaro Casey MD    Subjective   Subjective     CC: Follow-up fever, leukopenia    HPI: Patient was febrile last night with Tmax 102.5    ROS:  Gen- No fevers, chills  CV- No chest pain, palpitations  Resp- No cough, dyspnea  GI- No N/V/D, abd pain      Objective   Objective     Vital Signs:   Temp:  [98.7 °F (37.1 °C)-102.5 °F (39.2 °C)] 102.5 °F (39.2 °C)  Heart Rate:  [] 85  Resp:  [14-16] 16  BP: ()/(51-65) 108/65     Physical Exam:  .Constitutional: No acute distress, awake, alert  HENT: NCAT, mucous membranes moist  Respiratory: Clear to auscultation bilaterally, respiratory effort normal   Cardiovascular: RRR, no murmurs, rubs, or gallops  Gastrointestinal: Positive bowel sounds, soft, nontender, nondistended  Musculoskeletal: No bilateral ankle edema  Psychiatric: Appropriate affect, cooperative  Neurologic: Oriented x 3, nonfocal  Skin: No rashes    Results Reviewed:  LAB RESULTS:      Lab 06/12/23  0440 06/11/23  0643 06/10/23  1022   WBC 1.97* 2.01* 2.81*   HEMOGLOBIN 9.4* 11.1* 11.3*   HEMATOCRIT 29.7* 35.6 36.1   PLATELETS 160 209 267   NEUTROS ABS 1.54* 1.33* 1.90   IMMATURE GRANS (ABS)  --   --  0.01   LYMPHS ABS  --   --  0.69*   MONOS ABS  --   --  0.18   EOS ABS 0.00 0.00 0.01   MCV 87.1 90.6 89.4   PROCALCITONIN  --   --  0.14   LACTATE  --   --  1.9         Lab 2343 06/10/23  1022   SODIUM 137 137 138   POTASSIUM 3.2* 3.8 3.8   CHLORIDE 106 102 103   CO2 22.0 28.0 23.0   ANION GAP 9.0 7.0 12.0   BUN 6 9 8   CREATININE 0.58 0.63 0.63   EGFR 107.7 105.6 105.6   GLUCOSE 106* 103* 102*   CALCIUM 7.7* 8.6 9.0         Lab 23  0440 23  0643 06/10/23  1022   TOTAL PROTEIN 5.1* 6.1 7.1   ALBUMIN 3.2* 3.7 4.1   GLOBULIN 1.9  --  3.0   ALT (SGPT) 24 17 9   AST  (SGOT) 49* 31 18   BILIRUBIN 0.3 0.3 0.2   BILIRUBIN DIRECT  --  <0.2  --    ALK PHOS 92 106 105   LIPASE  --   --  37                     Brief Urine Lab Results  (Last result in the past 365 days)      Color   Clarity   Blood   Leuk Est   Nitrite   Protein   CREAT   Urine HCG        06/10/23 1023 Yellow   Clear   Negative   Negative   Negative   Negative                 Microbiology Results Abnormal     Procedure Component Value - Date/Time    Blood Culture - Blood, Arm, Left [[id number]]  (Normal) Collected: 06/10/23 1207    Lab Status: Preliminary result Specimen: Blood from Arm, Left Updated: 06/11/23 1216     Blood Culture No growth at 24 hours    Urine Culture - Urine, Urine, Clean Catch [[id number]]  (Normal) Collected: 06/10/23 1023    Lab Status: Final result Specimen: Urine, Clean Catch Updated: 06/11/23 1201     Urine Culture No growth    Blood Culture - Blood, Arm, Left [[id number]]  (Normal) Collected: 06/10/23 1022    Lab Status: Preliminary result Specimen: Blood from Arm, Left Updated: 06/11/23 1046     Blood Culture No growth at 24 hours    COVID PRE-OP / PRE-PROCEDURE SCREENING ORDER (NO ISOLATION) - Swab, Nasopharynx [[id number]]  (Normal) Collected: 06/10/23 1212    Lab Status: Final result Specimen: Swab from Nasopharynx Updated: 06/10/23 1318    Narrative:      The following orders were created for panel order COVID PRE-OP / PRE-PROCEDURE SCREENING ORDER (NO ISOLATION) - Swab, Nasopharynx.  Procedure                               Abnormality         Status                     ---------                               -----------         ------                     Respiratory Panel PCR w/...[[id number]]  Normal              Final result                 Please view results for these tests on the individual orders.    Respiratory Panel PCR w/COVID-19(SARS-CoV-2) [facility] In-House, NP Swab in UTM/VTM, 3-4 HR TAT - Swab, Nasopharynx [[id number]]  (Normal) Collected: 06/10/23 1212    Lab  Status: Final result Specimen: Swab from Nasopharynx Updated: 06/10/23 1318     ADENOVIRUS, PCR Not Detected     Coronavirus 229E Not Detected     Coronavirus HKU1 Not Detected     Coronavirus NL63 Not Detected     Coronavirus OC43 Not Detected     COVID19 Not Detected     Human Metapneumovirus Not Detected     Human Rhinovirus/Enterovirus Not Detected     Influenza A PCR Not Detected     Influenza B PCR Not Detected     Parainfluenza Virus 1 Not Detected     Parainfluenza Virus 2 Not Detected     Parainfluenza Virus 3 Not Detected     Parainfluenza Virus 4 Not Detected     RSV, PCR Not Detected     Bordetella pertussis pcr Not Detected     Bordetella parapertussis PCR Not Detected     Chlamydophila pneumoniae PCR Not Detected     Mycoplasma pneumo by PCR Not Detected    Narrative:      In the setting of a positive respiratory panel with a viral infection PLUS a negative procalcitonin without other underlying concern for bacterial infection, consider observing off antibiotics or discontinuation of antibiotics and continue supportive care. If the respiratory panel is positive for atypical bacterial infection (Bordetella pertussis, Chlamydophila pneumoniae, or Mycoplasma pneumoniae), consider antibiotic de-escalation to target atypical bacterial infection.          CT Abdomen Pelvis Without Contrast    Result Date: 6/10/2023  CT ABDOMEN PELVIS WO CONTRAST Date of Exam: 6/10/2023 10:49 AM EDT Indication: right flank pain, fever, recent stent Comparison: KUB 5/24/2023, outside CT abdomen/pelvis 5/22/2023 Technique: Axial CT images were obtained of the abdomen and pelvis without the administration of contrast. Reconstructed coronal and sagittal images were also obtained. Automated exposure control and iterative construction methods were used. Findings: Included lung bases are clear. No pleural effusion. Previously seen distal right ureteral calculus is no longer visualized, and there has been interval resolution of  previously seen right hydronephrosis compared to 5/22/2023 CT with only mild residual dilatation of the right renal pelvis. There is mild right perinephric stranding, which also appears decreased. No nephroureterolithiasis. Adrenal glands, spleen, pancreas, liver, gallbladder appear unremarkable for noncontrast CT. No abnormal bowel distention is seen. Appendix is not visualized, but no right lower quadrant inflammation is noted. There are no urinary bladder calcifications.  Urinary bladder is incompletely distended. No significant free fluid or lymphadenopathy is noted. No acute osseous abnormality is identified. There is leftward curvature of the lumbar spine.     Impression: Impression: Resolution of previously seen right ureteral calculus and right hydronephrosis. Mild right perinephric stranding, decreased compared to 5/22/2023 CT. Electronically Signed: Honey Pro  6/10/2023 11:05 AM EDT  Workstation ID: [id number]    XR Chest 1 View    Result Date: 6/10/2023  XR CHEST 1 VW Date of Exam: 6/10/2023 11:58 AM EDT Indication: Fever Comparison: CT abdomen and pelvis 6/10/2023. No previous chest imaging available for comparison. Findings: Lungs are well expanded and clear. No consolidation or large pleural effusion is seen. Cardiomediastinal contours appear within normal limits. There is rightward curvature of the thoracic spine.     Impression: Impression: No acute cardiopulmonary abnormality is identified. Electronically Signed: Honey Pro  6/10/2023 12:25 PM EDT  Workstation ID: [id number]          Current medications:  Scheduled Meds:cefTRIAXone, 1 g, Intravenous, Q24H  enoxaparin, 40 mg, Subcutaneous, Daily  levothyroxine, 75 mcg, Oral, Q AM  potassium chloride ER, 40 mEq, Oral, Q4H  senna-docusate sodium, 2 tablet, Oral, BID  sodium chloride, 10 mL, Intravenous, Q12H      Continuous Infusions:sodium chloride, 100 mL/hr, Last Rate: 100 mL/hr (06/12/23 0622)      PRN Meds:.•  acetaminophen  •  senna-docusate  "sodium **AND** polyethylene glycol **AND** bisacodyl **AND** bisacodyl  •  Calcium Replacement - Follow Nurse / BPA Driven Protocol  •  HYDROcodone-acetaminophen  •  Magnesium Standard Dose Replacement - Follow Nurse / BPA Driven Protocol  •  melatonin  •  ondansetron  •  Phosphorus Replacement - Follow Nurse / BPA Driven Protocol  •  Potassium Replacement - Follow Nurse / BPA Driven Protocol  •  promethazine  •  Sodium Chloride (PF)  •  sodium chloride  •  sodium chloride  •  zolpidem    Assessment & Plan   Assessment & Plan     Active Hospital Problems    Diagnosis  POA   • **Acute febrile illness [R50.9]  Yes   • Ureteral stone [N20.1]  Yes      Resolved Hospital Problems   No resolved problems to display.        Brief Hospital Course to date:  Gypsy Mann is a 53 y.o. female hypothyroidism, lumbar radiculopathy, insomnia, and kidney stones. Patient underwent recent right ureteral stone with hydronephrosis s/p urethral dilation, right ureteral stent placement and retrograde pyelogram with Dr. Pascual on 5/24. She was discharged on 10 days of Bactrim. She underwent laser lithotripsy with ureteral stent stent exchange on 6/5 and placed on several days of macrobid. She states she removed the stent herself on 6/7 and completed her macrobid on 6/9 when she began having fevers up to 102 mild abdominal \"bloating\" similar to symptoms she had when initially diagnosed w/ kidney stones. She called Urology office who advised her to go and get checked in the ED on 6/10. In ED wbc was 2.81. otherwise had normal labs including lipase. U/a benign. Resp pcr panel negative. Ct a/p revealed interval resolution of previously seen right ureteral calculus and hydronephrosis w/ mild right perinephric stranding (decreased from 5/22/23 scan).      This patient's problems and plans were partially entered by my partner and updated as appropriate by me 06/12/23.    Fever w/ Leukopenia  Recent Urologic Procedures   - UA benign, blood " cultures NGTD, Resp PCR negative, cxr negative, procal negative  - CT scan appears improved from previous, no further ureteral calculus or hydro, perinephric stranding also improved  - stent removed by patient on Wednesday (follows w/ Dr. Pascual) Dr. Mccarthy following no plan for any radical surgical intervention at this time  -Continue empiric Rocephin (held bactrim and macrobid)  -She continues to spike fevers, WBC trending down, we will have ID to weigh in, heme consult will also be considered    Hypothyroidism:  - continue levothyroxine     Chronic Pain:  - continue home pain medications    Expected Discharge Location and Transportation: home  Expected Discharge   Expected discharge date/ time has not been documented.     DVT prophylaxis:  Medical DVT prophylaxis orders are present.     AM-PAC 6 Clicks Score (PT): 24 (06/11/23 2000)    CODE STATUS:   Code Status and Medical Interventions:   Ordered at: 06/10/23 1430     Level Of Support Discussed With:    Patient     Code Status (Patient has no pulse and is not breathing):    CPR (Attempt to Resuscitate)     Medical Interventions (Patient has pulse or is breathing):    Full Support       Abhi Arredondo MD  06/12/23

## 2023-06-12 NOTE — CONSULTS
INFECTIOUS DISEASE CONSULT/INITIAL HOSPITAL VISIT    Gyspy Mann  1970  [id number]    Date of consult: 6/12/2023    Admit date: 6/10/2023    Requesting Provider: Dr. Arredondo  Evaluating physician: Bubba Mejia MD  Reason for Consultation: Persistent fevers and leukopenia  Chief Complaint: fever      Subjective   History of present illness:  Gypsy Mann is a  53 y.o.  Yr old female with a past medical history of hypothyroidism, lumbar radiculopathy, and a recent right ureteral stone with hydronephrosis status post cystoscopy, right retrograde pyelogram, and right ureteral stent placement on 5/24/23 by Dr. Pascual. The patient was discharged on 10 days of oral Bactrim. Urine culture from 5/24 grew 25,000 CFU per milliliter E. Coli that was pan susceptible. She underwent laser lithotripsy and ureteral stent exchange on 6/5 and subsequently received several days of Macrobid.  She removed the stent herself on Wednesday, 6/7 and finished her antibiotics on 6/9.  She subsequently started having fevers up to 102°F and fatigue. She was also having some mild ongoing flank pain. She called the urology office who advised her to go to the ER and she was subsequently admitted on 6/10/23. Since arrival, the patient has been experiencing ongoing fevers up to 102.5°F last 24 hours. She has also been leukopenic with a decreasing white blood cell count down to 1.97 today with an absolute neutrophil count of 1540. Platelet count is within normal limits. Hemoglobin is worse today at 9.4. She also has a bandemia of 18%, down from 30% yesterday. Creatinine is stable at 0.58.  LFTs are mostly normal with a slight elevation of the AST. Chest x-ray is without any acute cardiopulmonary abnormalities.  CT abdomen and pelvis from 6/10 showed resolution of previously seen right ureteral calculus and right hydronephrosis.  Mild right perinephric stranding, decreased compared to 5/22 CT scan. Blood cultures from 6/10 are no growth  to date.  Urine culture from 6/10 was no growth final.  Respiratory PCR panel was negative. The patient has been on ceftriaxone 1 g IV every 24 hours since arrival. Urology has evaluated the patient and no urologic surgical intervention is indicated. Hematology consult is being considered. ID consult has been placed for evaluation of her persistent fevers and worsening leukopenia.    Of note, the patient recently traveled to [place] in May and started feeling bloated and started feeling poorly shortly after. She did have some diarrhea shortly after returning from [place] but this is since improved. She being and having intermittent fevers couple weeks ago.  She does report a tick exposure shortly after getting back from [place] and her  was also exposed to ticks and he did empirically start doxycycline but she has not been on any doxycycline.  She does not report any rashes.  She did have a fever blister towards the end of her trip pain in her mouth with some mild bleeding but this has since resolved.  She is having some headaches intermittently.  She denies any severe shortness of breath and only has a mild cough since arrival to the hospital.  She continues to have some abdominal bloating but no severe pain.  Her right flank pain has improved.  No joint swelling or tenderness.  No known sick contacts.  She denies eating any raw or undercooked food but did eat out at restaurants while in [place].  No known mosquito exposures.    Past Medical History:   Diagnosis Date   • Back pain    • Bronchitis    • Hypothyroid    • Insomnia    • Kidney stones    • Lumbosacral disc disease Aug 2022       Past Surgical History:   Procedure Laterality Date   • BACK SURGERY  2022    L5/S1 Diskectomy   • BILATERAL BREAST REDUCTION Bilateral 10/31/2019    Procedure: BREAST REDUCTION BILATERAL;  Surgeon: Francis Galvan MD;  [facility];  Service: Plastics   •  SECTION      x two   • COLONOSCOPY     •  CYSTOSCOPY, URETEROSCOPY, RETROGRADE PYELOGRAM, STONE EXTRACTION, STENT INSERTION Right 5/24/2023    Procedure: CYSTOSCOPY RIGHT RETROGRADE PYELOGRAM AND RIGHT URETERAL STENT PLACEMENT;  Surgeon: Parag Pascual MD;  Location: [facility];  Service: Urology;  Laterality: Right;   • EPIDURAL BLOCK  8-24-22 and 9-28-22   • LUMBAR DISCECTOMY Right 12/30/2022    Procedure: LUMBAR DISCECTOMY L5-S1 RIGHT;  Surgeon: Yared Zapien MD;  Location: [facility];  Service: Neurosurgery;  Laterality: Right;   • REDUCTION MAMMAPLASTY Bilateral     2020   • URETEROSCOPY LASER LITHOTRIPSY WITH STENT INSERTION Right 6/5/2023    Procedure: URETEROSCOPY LASER LITHOTRIPSY WITH STENT INSERTION - RIGHT;  Surgeon: Parag Pascual MD;  Location: [facility];  Service: Urology;  Laterality: Right;       Pediatric History   Patient Parents   • Not on file     Other Topics Concern   • Not on file   Social History Narrative    Works at [employer]   the patient is  and has a daughter. she recently traveled to [place] and started feeling poorly shortly after. She did eat out at some restaurants while in [place] but no reported wrong or undercooked food. She was exposed to ticks shortly after arriving back from [place]. No known mosquito exposures.  No known sick Contacts.  No known tuberculosis exposures.    family history includes COPD in her father; Cancer in her maternal grandmother and mother; Diabetes in her son; Emphysema in her father; Heart disease in her father; Hyperlipidemia in her father; Hypertension in her father; Thyroid disease in her mother.    Allergies   Allergen Reactions   • Zofran [Ondansetron] Headache       Immunization History   Administered Date(s) Administered   • COVID-19 (PFIZER) Purple Cap Monovalent 12/21/2020, 01/11/2021, 10/06/2021       Medication:    Current Facility-Administered Medications:   •  acetaminophen (TYLENOL) tablet 650 mg, 650 mg, Oral, Q4H PRN, Petrona Asher, ,  650 mg at 06/12/23 0907  •  sennosides-docusate (PERICOLACE) 8.6-50 MG per tablet 2 tablet, 2 tablet, Oral, BID, 2 tablet at 06/12/23 0907 **AND** polyethylene glycol (MIRALAX) packet 17 g, 17 g, Oral, Daily PRN **AND** bisacodyl (DULCOLAX) EC tablet 5 mg, 5 mg, Oral, Daily PRN **AND** bisacodyl (DULCOLAX) suppository 10 mg, 10 mg, Rectal, Daily PRN, Petrona Asher, DO  •  Calcium Replacement - Follow Nurse / BPA Driven Protocol, , Does not apply, PRN, Petrona Asher, DO  •  cefTRIAXone (ROCEPHIN) 1 g/100 mL 0.9% NS (MBP), 1 g, Intravenous, Q24H, Petrona Asher DO, 1 g at 06/11/23 1122  •  Enoxaparin Sodium (LOVENOX) syringe 40 mg, 40 mg, Subcutaneous, Daily, Petrona Asher DO, 40 mg at 06/12/23 0907  •  HYDROcodone-acetaminophen (NORCO) 5-325 MG per tablet 1 tablet, 1 tablet, Oral, Q6H PRN, Petrona Asher, DO  •  levothyroxine (SYNTHROID, LEVOTHROID) tablet 75 mcg, 75 mcg, Oral, Q AM, Petrona Asher DO, 75 mcg at 06/12/23 0542  •  Magnesium Standard Dose Replacement - Follow Nurse / BPA Driven Protocol, , Does not apply, PRN, Petrona Asher, DO  •  melatonin tablet 10 mg, 10 mg, Oral, Nightly PRN, Willow Muniz, APRN, 10 mg at 06/11/23 0139  •  ondansetron (ZOFRAN) injection 4 mg, 4 mg, Intravenous, Q6H PRN, Petrona Asher, DO  •  Phosphorus Replacement - Follow Nurse / BPA Driven Protocol, , Does not apply, PRN, Petrona Asher, DO  •  Potassium Replacement - Follow Nurse / BPA Driven Protocol, , Does not apply, PRN, Petrona Asher, DO  •  promethazine (PHENERGAN) 12.5 mg in sodium chloride 0.9 % 50 mL, 12.5 mg, Intravenous, Q6H PRN, Petrona Asher, DO, Last Rate: 0 mL/hr at 06/10/23 1135, 12.5 mg at 06/11/23 1326  •  Sodium Chloride (PF) 0.9 % 10 mL, 10 mL, Intravenous, PRN, Petrona Asher, DO, 10 mL at 06/10/23 1146  •  sodium chloride 0.9 % flush 10 mL, 10 mL, Intravenous, Q12H, Petrona Asher, DO, 10 mL at 06/10/23 2008  •  sodium chloride 0.9 % flush 10 mL, 10 mL,  "Intravenous, PRN, Petrona Asher, DO  •  sodium chloride 0.9 % infusion 40 mL, 40 mL, Intravenous, PRN, Petrona Asher, DO  •  sodium chloride 0.9 % infusion, 100 mL/hr, Intravenous, Continuous, Ata Mcguire MD, Last Rate: 100 mL/hr at 06/12/23 0622, 100 mL/hr at 06/12/23 0622  •  zolpidem (AMBIEN) tablet 5 mg, 5 mg, Oral, Nightly PRN, Ata Mcguire MD, 5 mg at 06/11/23 2044    Please refer to the medical record for a full medication list    Review of Systems:    Constitutional-- + Fever, chills. + fatigue.  HEENT-- No new vision, hearing or throat complaints.  No epistaxis or oral sores.  Denies odynophagia or dysphagia.  No odynophagia or dysphagia. No headache, photophobia or neck stiffness.  CV-- No chest pain, palpitation or syncope  Resp-- No SOB/cough/Hemoptysis  GI- No nausea, vomiting, or diarrhea. She did have some diarrhea couple weeks ago that has since resolved.  She did have some nausea and vomiting that has resolved. Still has some abdominal bloating but no severe abdominal pain. No hematochezia, melena, or hematemesis. Denies jaundice or chronic liver disease.  -- right flank pain has resolved. No dysuria, hematuria.  Lymph- no swollen lymph nodes in neck/axilla or groin.   Heme- No active bruising or bleeding; no Hx of DVT or PE.  MS-- no swelling or pain in the bones or joints of arms/legs.  No new back pain.  Neuro-- No acute focal weakness or numbness in the arms or legs.  No seizures.  Skin--No rashes or lesions    Physical Exam:   Vital Signs   Temp:  [98.7 °F (37.1 °C)-102.5 °F (39.2 °C)] 99.5 °F (37.5 °C)  Heart Rate:  [] 87  Resp:  [16-18] 18  BP: ()/(51-65) 104/63    Temp  Min: 98.7 °F (37.1 °C)  Max: 102.5 °F (39.2 °C)  BP  Min: 99/60  Max: 109/57  Pulse  Min: 79  Max: 100  Resp  Min: 16  Max: 18  SpO2  Min: 92 %  Max: 98 %    Blood pressure 104/63, pulse 87, temperature 99.5 °F (37.5 °C), temperature source Oral, resp. rate 18, height 160 cm (63\"), " weight 63.5 kg (140 lb), SpO2 98 %.  GENERAL: Awake and alert, in no acute distress. Appears stated age.  Frail  HEENT:  Normocephalic, atraumatic.  Oropharynx without thrush. Dentition in good repair. No cervical adenopathy. No neck masses.  Ears externally normal, Nose externally normal.  EYES: PERRL. No conjunctival injection. No icterus. EOM full.  HEART: RRR, no murmur  LUNGS: Clear to auscultation and percussion. No respiratory distress, no use of accessory muscles.  ABDOMEN: Soft, nontender, nondistended. No appreciable HSM.  Bowel sounds normal.  MSK: No joint effusions noted.  Full range of motion throughout.  No tenderness to palpation directly over her spine.  No CVA tenderness  GENITAL: no Barnes catheter  SKIN: no generalized rashes.  No peripheral stigmata of infective endocarditis noted.  PSYCHIATRIC: cooperative.  Appropriate mood and affect  EXT:  No cellulitic change.  NEURO: awake alert and oriented ×4.  Normal speech and cognition    Left upper external peripheral IV site without any erythema    Results Review:   I reviewed the patient's new clinical results.    Results from last 7 days   Lab Units 06/12/23  0440 06/11/23  0643 06/10/23  1022   WBC 10*3/mm3 1.97* 2.01* 2.81*   HEMOGLOBIN g/dL 9.4* 11.1* 11.3*   HEMATOCRIT % 29.7* 35.6 36.1   PLATELETS 10*3/mm3 160 209 267     Results from last 7 days   Lab Units 06/12/23  0440   SODIUM mmol/L 137   POTASSIUM mmol/L 3.2*   CHLORIDE mmol/L 106   CO2 mmol/L 22.0   BUN mg/dL 6   CREATININE mg/dL 0.58   GLUCOSE mg/dL 106*   CALCIUM mg/dL 7.7*     Results from last 7 days   Lab Units 06/12/23  0440 06/11/23  0643   ALK PHOS U/L 92 106   BILIRUBIN mg/dL 0.3 0.3   BILIRUBIN DIRECT mg/dL  --  <0.2   ALT (SGPT) U/L 24 17   AST (SGOT) U/L 49* 31                 Results from last 7 days   Lab Units 06/10/23  1022   LACTATE mmol/L 1.9     Estimated Creatinine Clearance: 99.4 mL/min (by C-G formula based on SCr of 0.58 mg/dL).  CPK          5/24/2023    07:51    Common Labsle   Creatine Kinase 27       Procalitonin Results:      Lab 06/10/23  1022   PROCALCITONIN 0.14      Brief Urine Lab Results  (Last result in the past 365 days)        Color   Clarity   Blood   Leuk Est   Nitrite   Protein   CREAT   Urine HCG        06/10/23 1023 Yellow   Clear   Negative   Negative   Negative   Negative                  No results found for: SITE, ALLENTEST, PHART, YNH4YDH, PO2ART, VOW6LXR, BASEEXCESS, W9ZEAKDI, HGBBG, HCTABG, OXYHEMOGLOBI, METHHGBN, CARBOXYHGB, CO2CT, BAROMETRIC, MODALITY, FIO2     Microbiology:  Blood Culture   Date Value Ref Range Status   06/10/2023 No growth at 24 hours  Preliminary       Urine Culture   Date Value Ref Range Status   06/10/2023 No growth  Final     No results found for: VIRALCULTU, WOUNDCX     Blood Culture   Date Value Ref Range Status   06/10/2023 No growth at 24 hours  Preliminary     Urine Culture   Date Value Ref Range Status   06/10/2023 No growth  Final   (      Radiology:  Imaging Results (Last 72 Hours)       Procedure Component Value Units Date/Time    XR Chest 1 View [[id number]] Collected: 06/10/23 1223     Updated: 06/10/23 1228    Narrative:      XR CHEST 1 VW    Date of Exam: 6/10/2023 11:58 AM EDT    Indication: Fever    Comparison: CT abdomen and pelvis 6/10/2023. No previous chest imaging available for comparison.    Findings:  Lungs are well expanded and clear. No consolidation or large pleural effusion is seen. Cardiomediastinal contours appear within normal limits. There is rightward curvature of the thoracic spine.      Impression:      Impression:  No acute cardiopulmonary abnormality is identified.      Electronically Signed: Honey Pro    6/10/2023 12:25 PM EDT    Workstation ID: [id number]    CT Abdomen Pelvis Without Contrast [[id number]] Collected: 06/10/23 1100     Updated: 06/10/23 1108    Narrative:      CT ABDOMEN PELVIS WO CONTRAST    Date of Exam: 6/10/2023 10:49 AM EDT    Indication: right flank pain, fever,  recent stent    Comparison: [facility] 5/24/2023, outside CT abdomen/pelvis 5/22/2023    Technique: Axial CT images were obtained of the abdomen and pelvis without the administration of contrast. Reconstructed coronal and sagittal images were also obtained. Automated exposure control and iterative construction methods were used.    Findings:  Included lung bases are clear. No pleural effusion.    Previously seen distal right ureteral calculus is no longer visualized, and there has been interval resolution of previously seen right hydronephrosis compared to 5/22/2023 CT with only mild residual dilatation of the right renal pelvis. There is mild   right perinephric stranding, which also appears decreased. No nephroureterolithiasis.    Adrenal glands, spleen, pancreas, liver, gallbladder appear unremarkable for noncontrast CT. No abnormal bowel distention is seen. Appendix is not visualized, but no right lower quadrant inflammation is noted. There are no urinary bladder calcifications.   Urinary bladder is incompletely distended. No significant free fluid or lymphadenopathy is noted. No acute osseous abnormality is identified. There is leftward curvature of the lumbar spine.      Impression:      Impression:  Resolution of previously seen right ureteral calculus and right hydronephrosis. Mild right perinephric stranding, decreased compared to 5/22/2023 CT.            Electronically Signed: Honey Pro    6/10/2023 11:05 AM EDT    Workstation ID: [id number]        I independently read the patient's chest x-ray from 6/10-no acute cardio pulmonary abnormalities noted.  No consolidation or pleural effusion seen.    IMPRESSION:     Problems:  1. Sepsis with ongoing fevers, intermittent tachycardia, worsening leukopenia, and bandemia. Source remains unclear.  Did have a recent urinary tract infection on 5/24 which should be covered by ceftriaxone.  Macrobid does not get into the upper urinary tract but may have sterilized her lower  "urinary tract.  Repeat urine culture on admission was no growth.  Blood cultures from 6/10 are no growth to date.  CT abdomen and pelvis without contrast from 6/10 showed mild right perinephric stranding but decreased from prior CT scan on 5/22. No other acute intra-abdominal process noted. Respiratory PCR panel was negative. She does report recent exposure to ticks. No raw or undercooked food although she did eat out at restaurants while in [place] prior to her symptoms starting. Did have some initial diarrhea, nausea, and vomiting in May but these symptoms have since resolved. Could be ehrlichiosis. RMSF seems less likely. Other viral process is a possibility although no known sick contacts. She did have recent \"fever blister\" and she is having headache so HSV is possible but mouth sore has resolved and she has no nuchal rigidity or neck pain. WNV or other arboviral infection is possible but no reported exposure to mosquitos recently. Disseminated fungal infection is possible and could infiltrate bone marrow but seems less likely. A non-infectious process such as rheumatologic process or hematologic malignancy remains possible. Delayed drug reaction possible but no rash. If cytopenias worse then she may need a hematology evaluate with consideration for a BM biopsy.   2. Leukopenia/neutropenia-worsening  3. Normocytic anemia-worsening  4. Recent E. Coli urinary tract infection  5. hypothyroidism    RECOMMENDATIONS:      -stop ceftriaxone as her urinary infection should be adequately treated  -send WNV IgM, Ehrlichia PCR, and RMSF serologies  -start doxycycline 100 mg PO BID  -continue to closely monitor cbc with diff. If cytopenias continue to worsen and fevers are ongoing then we will likely need to consider hematology consultation with possible BM biopsy.    I discussed in length with the patient, her , and her daughter at bedside today    I discussed with Dr. Arredondo today    Thank you for asking me to see " Gypsy Mann.  Our group would be pleased to follow this patient over the course of their hospitalization and assist with outpatient antimicrobial therapy, as indicated.  Further recommendations depend on the results of the cultures and clinical course.    Complex MDM    Bubba Mejia MD  6/12/2023

## 2023-06-12 NOTE — CASE MANAGEMENT/SOCIAL WORK
Discharge Planning Assessment  [facility]     Patient Name: Gypsy Mann  MRN: [id number]  Today's Date: 6/12/2023    Admit Date: 6/10/2023    Plan: FABIO eval   Discharge Needs Assessment       Row Name 06/12/23 1210       Living Environment    People in Home spouse;child(ruthie), adult    Current Living Arrangements home    Potentially Unsafe Housing Conditions none    Primary Care Provided by self    Provides Primary Care For no one    Family Caregiver if Needed spouse    Quality of Family Relationships involved;helpful    Able to Return to Prior Arrangements yes       Resource/Environmental Concerns    Resource/Environmental Concerns none       Transition Planning    Patient/Family Anticipates Transition to home with family;home    Patient/Family Anticipated Services at Transition     Transportation Anticipated family or friend will provide       Discharge Needs Assessment    Readmission Within the Last 30 Days no previous admission in last 30 days    Equipment Currently Used at Home none    Concerns to be Addressed discharge planning    Anticipated Changes Related to Illness none    Equipment Needed After Discharge none                   Discharge Plan       Row Name 06/12/23 1211       Plan    Plan CM eval    Plan Comments Confirmed patient lives with her spouse in [facility]. She is independent of ADLs and denies any issues with her commercial [employer] policy. She does not use DME at home at baseline. Infectious disease consult pending for today. Goal is to return home upon discharge. CM will continue to follow- gonzalez [phone]    Final Discharge Disposition Code 01 - home or self-care                  Continued Care and Services - Admitted Since 6/10/2023    Coordination has not been started for this encounter.       Expected Discharge Date and Time       Expected Discharge Date Expected Discharge Time    Jun 14, 2023            Demographic Summary       Row Name 06/12/23 1210       General  Information    Admission Type inpatient    Arrived From home    Referral Source admission list    Reason for Consult discharge planning    Preferred Language English       Contact Information    Permission Granted to Share Info With                    Functional Status       Row Name 06/12/23 1210       Functional Status    Usual Activity Tolerance good    Current Activity Tolerance moderate       Functional Status, IADL    Medications independent    Meal Preparation independent    Housekeeping independent    Laundry independent    Shopping independent                   Psychosocial    No documentation.                  Abuse/Neglect    No documentation.                  Legal    No documentation.                  Substance Abuse    No documentation.                  Patient Forms    No documentation.                     Linda Aragon RN

## 2023-06-13 LAB
BASOPHILS # BLD MANUAL: 0.02 10*3/MM3 (ref 0–0.2)
BASOPHILS NFR BLD MANUAL: 1 % (ref 0–1.5)
DEPRECATED RDW RBC AUTO: 43.8 FL (ref 37–54)
EOSINOPHIL # BLD MANUAL: 0 10*3/MM3 (ref 0–0.4)
EOSINOPHIL NFR BLD MANUAL: 0 % (ref 0.3–6.2)
ERYTHROCYTE [DISTWIDTH] IN BLOOD BY AUTOMATED COUNT: 13.3 % (ref 12.3–15.4)
HCT VFR BLD AUTO: 28.9 % (ref 34–46.6)
HGB BLD-MCNC: 9.1 G/DL (ref 12–15.9)
LYMPHOCYTES # BLD MANUAL: 1.3 10*3/MM3 (ref 0.7–3.1)
LYMPHOCYTES NFR BLD MANUAL: 3 % (ref 5–12)
MCH RBC QN AUTO: 28 PG (ref 26.6–33)
MCHC RBC AUTO-ENTMCNC: 31.5 G/DL (ref 31.5–35.7)
MCV RBC AUTO: 88.9 FL (ref 79–97)
MONOCYTES # BLD: 0.07 10*3/MM3 (ref 0.1–0.9)
MYELOCYTES NFR BLD MANUAL: 1 % (ref 0–0)
NEUTROPHILS # BLD AUTO: 0.79 10*3/MM3 (ref 1.7–7)
NEUTROPHILS NFR BLD MANUAL: 21 % (ref 42.7–76)
NEUTS BAND NFR BLD MANUAL: 15 % (ref 0–5)
NRBC SPEC MANUAL: 0 /100 WBC (ref 0–0.2)
PLAT MORPH BLD: NORMAL
PLATELET # BLD AUTO: 110 10*3/MM3 (ref 140–450)
PMV BLD AUTO: 10.3 FL (ref 6–12)
RBC # BLD AUTO: 3.25 10*6/MM3 (ref 3.77–5.28)
RBC MORPH BLD: NORMAL
VARIANT LYMPHS NFR BLD MANUAL: 10 % (ref 0–5)
VARIANT LYMPHS NFR BLD MANUAL: 49 % (ref 19.6–45.3)
WBC MORPH BLD: NORMAL
WBC NRBC COR # BLD: 2.2 10*3/MM3 (ref 3.4–10.8)

## 2023-06-13 PROCEDURE — 85025 COMPLETE CBC W/AUTO DIFF WBC: CPT | Performed by: INTERNAL MEDICINE

## 2023-06-13 PROCEDURE — 85007 BL SMEAR W/DIFF WBC COUNT: CPT | Performed by: INTERNAL MEDICINE

## 2023-06-13 PROCEDURE — 25010000002 ENOXAPARIN PER 10 MG: Performed by: INTERNAL MEDICINE

## 2023-06-13 RX ORDER — TRAMADOL HYDROCHLORIDE 50 MG/1
50 TABLET ORAL EVERY 6 HOURS PRN
Status: DISCONTINUED | OUTPATIENT
Start: 2023-06-13 | End: 2023-06-14 | Stop reason: HOSPADM

## 2023-06-13 RX ADMIN — ACETAMINOPHEN 650 MG: 325 TABLET ORAL at 18:29

## 2023-06-13 RX ADMIN — DOXYCYCLINE 100 MG: 100 CAPSULE ORAL at 20:43

## 2023-06-13 RX ADMIN — ENOXAPARIN SODIUM 40 MG: 100 INJECTION SUBCUTANEOUS at 08:34

## 2023-06-13 RX ADMIN — ACETAMINOPHEN 650 MG: 325 TABLET ORAL at 08:34

## 2023-06-13 RX ADMIN — Medication 10 ML: at 20:44

## 2023-06-13 RX ADMIN — LEVOTHYROXINE SODIUM 75 MCG: 75 TABLET ORAL at 05:04

## 2023-06-13 RX ADMIN — DOXYCYCLINE 100 MG: 100 CAPSULE ORAL at 08:34

## 2023-06-13 RX ADMIN — ZOLPIDEM TARTRATE 5 MG: 5 TABLET ORAL at 20:43

## 2023-06-13 NOTE — PROGRESS NOTES
[facility] Medicine Services  PROGRESS NOTE    Patient Name: Gypsy Mann  : 1970  MRN: [id number]    Date of Admission: 6/10/2023  Primary Care Physician: Genaro Casey MD    Subjective   Subjective     CC: Follow-up fever and leukopenia    HPI: Patient was afebrile overnight, last fever was at 3 PM yesterday with Tmax 101.5    ROS:  Gen- No fevers, chills  CV- No chest pain, palpitations  Resp- No cough, dyspnea  GI- No N/V/D, abd pain       Objective   Objective     Vital Signs:   Temp:  [98.7 °F (37.1 °C)-101.5 °F (38.6 °C)] 100.3 °F (37.9 °C)  Heart Rate:  [] 85  Resp:  [18] 18  BP: ()/(51-66) 107/63     Physical Exam:  Constitutional: No acute distress, awake, alert  HENT: NCAT, mucous membranes moist  Respiratory: Clear to auscultation bilaterally, respiratory effort normal   Cardiovascular: RRR, no murmurs, rubs, or gallops  Gastrointestinal: Positive bowel sounds, soft, nontender, nondistended  Musculoskeletal: No bilateral ankle edema  Psychiatric: Appropriate affect, cooperative  Neurologic: Oriented x 3 nonfocal  Skin: No rashes    Results Reviewed:  LAB RESULTS:      Lab 23  0643 06/10/23  1022   WBC 1.97* 2.01* 2.81*   HEMOGLOBIN 9.4* 11.1* 11.3*   HEMATOCRIT 29.7* 35.6 36.1   PLATELETS 160 209 267   NEUTROS ABS 1.54* 1.33* 1.90   IMMATURE GRANS (ABS)  --   --  0.01   LYMPHS ABS  --   --  0.69*   MONOS ABS  --   --  0.18   EOS ABS 0.00 0.00 0.01   MCV 87.1 90.6 89.4   PROCALCITONIN  --   --  0.14   LACTATE  --   --  1.9         Lab 23  1406 23  0440 23  0643 06/10/23  1022   SODIUM  --  137 137 138   POTASSIUM 3.7 3.2* 3.8 3.8   CHLORIDE  --  106 102 103   CO2  --  22.0 28.0 23.0   ANION GAP  --  9.0 7.0 12.0   BUN  --  6 9 8   CREATININE  --  0.58 0.63 0.63   EGFR  --  107.7 105.6 105.6   GLUCOSE  --  106* 103* 102*   CALCIUM  --  7.7* 8.6 9.0         Lab 23  0440 23  0643 06/10/23  1022   TOTAL  PROTEIN 5.1* 6.1 7.1   ALBUMIN 3.2* 3.7 4.1   GLOBULIN 1.9  --  3.0   ALT (SGPT) 24 17 9   AST (SGOT) 49* 31 18   BILIRUBIN 0.3 0.3 0.2   BILIRUBIN DIRECT  --  <0.2  --    ALK PHOS 92 106 105   LIPASE  --   --  37                     Brief Urine Lab Results  (Last result in the past 365 days)      Color   Clarity   Blood   Leuk Est   Nitrite   Protein   CREAT   Urine HCG        06/10/23 1023 Yellow   Clear   Negative   Negative   Negative   Negative                 Microbiology Results Abnormal     Procedure Component Value - Date/Time    Blood Culture - Blood, Arm, Left [[id number]]  (Normal) Collected: 06/10/23 1207    Lab Status: Preliminary result Specimen: Blood from Arm, Left Updated: 06/12/23 1230     Blood Culture No growth at 2 days    Blood Culture - Blood, Arm, Left [[id number]]  (Normal) Collected: 06/10/23 1022    Lab Status: Preliminary result Specimen: Blood from Arm, Left Updated: 06/12/23 1045     Blood Culture No growth at 2 days    Urine Culture - Urine, Urine, Clean Catch [[id number]]  (Normal) Collected: 06/10/23 1023    Lab Status: Final result Specimen: Urine, Clean Catch Updated: 06/11/23 1201     Urine Culture No growth    COVID PRE-OP / PRE-PROCEDURE SCREENING ORDER (NO ISOLATION) - Swab, Nasopharynx [[id number]]  (Normal) Collected: 06/10/23 1212    Lab Status: Final result Specimen: Swab from Nasopharynx Updated: 06/10/23 1318    Narrative:      The following orders were created for panel order COVID PRE-OP / PRE-PROCEDURE SCREENING ORDER (NO ISOLATION) - Swab, Nasopharynx.  Procedure                               Abnormality         Status                     ---------                               -----------         ------                     Respiratory Panel PCR w/...[[id number]]  Normal              Final result                 Please view results for these tests on the individual orders.    Respiratory Panel PCR w/COVID-19(SARS-CoV-2) TOO/CHRISSY/C[facility]SAR/PAD/[facility] In-House, NP Swab in  UTM/VTM, 3-4 HR TAT - Swab, Nasopharynx [[id number]]  (Normal) Collected: 06/10/23 1212    Lab Status: Final result Specimen: Swab from Nasopharynx Updated: 06/10/23 1318     ADENOVIRUS, PCR Not Detected     Coronavirus 229E Not Detected     Coronavirus HKU1 Not Detected     Coronavirus NL63 Not Detected     Coronavirus OC43 Not Detected     COVID19 Not Detected     Human Metapneumovirus Not Detected     Human Rhinovirus/Enterovirus Not Detected     Influenza A PCR Not Detected     Influenza B PCR Not Detected     Parainfluenza Virus 1 Not Detected     Parainfluenza Virus 2 Not Detected     Parainfluenza Virus 3 Not Detected     Parainfluenza Virus 4 Not Detected     RSV, PCR Not Detected     Bordetella pertussis pcr Not Detected     Bordetella parapertussis PCR Not Detected     Chlamydophila pneumoniae PCR Not Detected     Mycoplasma pneumo by PCR Not Detected    Narrative:      In the setting of a positive respiratory panel with a viral infection PLUS a negative procalcitonin without other underlying concern for bacterial infection, consider observing off antibiotics or discontinuation of antibiotics and continue supportive care. If the respiratory panel is positive for atypical bacterial infection (Bordetella pertussis, Chlamydophila pneumoniae, or Mycoplasma pneumoniae), consider antibiotic de-escalation to target atypical bacterial infection.          No radiology results from the last 24 hrs        Current medications:  Scheduled Meds:doxycycline, 100 mg, Oral, Q12H  enoxaparin, 40 mg, Subcutaneous, Daily  levothyroxine, 75 mcg, Oral, Q AM  senna-docusate sodium, 2 tablet, Oral, BID  sodium chloride, 10 mL, Intravenous, Q12H      Continuous Infusions:sodium chloride, 100 mL/hr, Last Rate: 100 mL/hr (06/12/23 0622)      PRN Meds:.•  acetaminophen  •  senna-docusate sodium **AND** polyethylene glycol **AND** bisacodyl **AND** bisacodyl  •  Calcium Replacement - Follow Nurse / BPA Driven Protocol  •   "HYDROcodone-acetaminophen  •  Magnesium Standard Dose Replacement - Follow Nurse / BPA Driven Protocol  •  melatonin  •  ondansetron  •  Phosphorus Replacement - Follow Nurse / BPA Driven Protocol  •  Potassium Replacement - Follow Nurse / BPA Driven Protocol  •  promethazine  •  Sodium Chloride (PF)  •  sodium chloride  •  sodium chloride  •  traMADol  •  zolpidem    Assessment & Plan   Assessment & Plan     Active Hospital Problems    Diagnosis  POA   • **Acute febrile illness [R50.9]  Yes   • Ureteral stone [N20.1]  Yes      Resolved Hospital Problems   No resolved problems to display.        Brief Hospital Course to date:  Gypsy Mann is a 53 y.o. female hypothyroidism, lumbar radiculopathy, insomnia, and kidney stones. Patient underwent recent right ureteral stone with hydronephrosis s/p urethral dilation, right ureteral stent placement and retrograde pyelogram with Dr. Pascual on 5/24. She was discharged on 10 days of Bactrim. She underwent laser lithotripsy with ureteral stent stent exchange on 6/5 and placed on several days of macrobid. She states she removed the stent herself on 6/7 and completed her macrobid on 6/9 when she began having fevers up to 102 mild abdominal \"bloating\" similar to symptoms she had when initially diagnosed w/ kidney stones. She called Urology office who advised her to go and get checked in the ED on 6/10. In ED wbc was 2.81. otherwise had normal labs including lipase. U/a benign. Resp pcr panel negative. Ct a/p revealed interval resolution of previously seen right ureteral calculus and hydronephrosis w/ mild right perinephric stranding (decreased from 5/22/23 scan).     Of note, patient reports recent travel to [place], came back 5/17, she reports having chills on the plane on the way back.  On arrival she had 4 days of diarrhea, and abdominal bloating, she presented to the ED and was then found with kidney stone.       Fever w/ Leukopenia  Recent Urologic Procedures   - UA " benign, blood cultures NGTD, Resp PCR negative, cxr negative, procal negative  - CT scan appears improved from previous, no further ureteral calculus or hydro, perinephric stranding also improved  - stent removed by patient on Wednesday (follows w/ Dr. Pascual) Dr. Mccarthy following no plan for any radical surgical intervention at this time  -ID consulted and following discussed with Dr. Mejia, additional work-up has been sent, recommends to DC Rocephin and start with doxycycline, if cytopenias continue to worsen, we will have hematology consulted for possible bone marrow biopsy     Hypothyroidism:  - continue levothyroxine     Chronic Pain:  - continue home pain medications     Expected Discharge Location and Transportation: home  Expected Discharge   Expected Discharge Date: 6/14/2023; Expected Discharge Time:      DVT prophylaxis:  Medical DVT prophylaxis orders are present.     AM-PAC 6 Clicks Score (PT): 24 (06/12/23 2000)    CODE STATUS:   Code Status and Medical Interventions:   Ordered at: 06/10/23 1430     Level Of Support Discussed With:    Patient     Code Status (Patient has no pulse and is not breathing):    CPR (Attempt to Resuscitate)     Medical Interventions (Patient has pulse or is breathing):    Full Support       Abhi Arredondo MD  06/13/23

## 2023-06-13 NOTE — PLAN OF CARE
Problem: Adult Inpatient Plan of Care  Goal: Plan of Care Review  Outcome: Ongoing, Progressing  Flowsheets (Taken 6/13/2023 1722)  Outcome Evaluation: VSS. RA. No complaints of pain during the shift. Afebrile. Fluids infusing per order. PO intake adequate. No other concerns at this time. Will continue with the plan of care.   Goal Outcome Evaluation:              Outcome Evaluation: VSS. RA. No complaints of pain during the shift. Pt spiked fever at 1830, PRN Tylenol given. Fluids infusing per order. PO intake adequate. No other concerns at this time. Will continue with the plan of care.

## 2023-06-13 NOTE — PLAN OF CARE
Goal Outcome Evaluation:              Outcome Evaluation: VSS on RA. Temperature improved overnight. PRN tylenol given for HA and fever at beginning of shift. Fluids continued. Slept well overnight. No further concerns at this time.

## 2023-06-14 ENCOUNTER — READMISSION MANAGEMENT (OUTPATIENT)
Dept: CALL CENTER | Facility: HOSPITAL | Age: 53
End: 2023-06-14
Payer: COMMERCIAL

## 2023-06-14 VITALS
TEMPERATURE: 98.6 F | DIASTOLIC BLOOD PRESSURE: 64 MMHG | BODY MASS INDEX: 24.8 KG/M2 | HEIGHT: 63 IN | WEIGHT: 140 LBS | HEART RATE: 79 BPM | OXYGEN SATURATION: 95 % | SYSTOLIC BLOOD PRESSURE: 120 MMHG | RESPIRATION RATE: 16 BRPM

## 2023-06-14 LAB
BASOPHILS # BLD MANUAL: 0.06 10*3/MM3 (ref 0–0.2)
BASOPHILS NFR BLD MANUAL: 2 % (ref 0–1.5)
DEPRECATED RDW RBC AUTO: 44.3 FL (ref 37–54)
EOSINOPHIL # BLD MANUAL: 0 10*3/MM3 (ref 0–0.4)
EOSINOPHIL NFR BLD MANUAL: 0 % (ref 0.3–6.2)
ERYTHROCYTE [DISTWIDTH] IN BLOOD BY AUTOMATED COUNT: 13.3 % (ref 12.3–15.4)
HCT VFR BLD AUTO: 29.9 % (ref 34–46.6)
HETEROPH AB SER QL LA: NEGATIVE
HGB BLD-MCNC: 9.2 G/DL (ref 12–15.9)
LYMPHOCYTES # BLD MANUAL: 1.86 10*3/MM3 (ref 0.7–3.1)
LYMPHOCYTES NFR BLD MANUAL: 2 % (ref 5–12)
MCH RBC QN AUTO: 28 PG (ref 26.6–33)
MCHC RBC AUTO-ENTMCNC: 30.8 G/DL (ref 31.5–35.7)
MCV RBC AUTO: 91.2 FL (ref 79–97)
MONOCYTES # BLD: 0.06 10*3/MM3 (ref 0.1–0.9)
NEUTROPHILS # BLD AUTO: 0.98 10*3/MM3 (ref 1.7–7)
NEUTROPHILS NFR BLD MANUAL: 27 % (ref 42.7–76)
NEUTS BAND NFR BLD MANUAL: 6 % (ref 0–5)
PLAT MORPH BLD: NORMAL
PLATELET # BLD AUTO: 127 10*3/MM3 (ref 140–450)
PMV BLD AUTO: 10.8 FL (ref 6–12)
RBC # BLD AUTO: 3.28 10*6/MM3 (ref 3.77–5.28)
RBC MORPH BLD: NORMAL
VARIANT LYMPHS NFR BLD MANUAL: 55 % (ref 19.6–45.3)
VARIANT LYMPHS NFR BLD MANUAL: 8 % (ref 0–5)
WBC MORPH BLD: NORMAL
WBC NRBC COR # BLD: 2.96 10*3/MM3 (ref 3.4–10.8)
WNV IGG SER QL IA: NEGATIVE
WNV IGM SER QL IA: NEGATIVE

## 2023-06-14 PROCEDURE — 85025 COMPLETE CBC W/AUTO DIFF WBC: CPT | Performed by: INTERNAL MEDICINE

## 2023-06-14 PROCEDURE — 99232 SBSQ HOSP IP/OBS MODERATE 35: CPT | Performed by: NURSE PRACTITIONER

## 2023-06-14 PROCEDURE — 85007 BL SMEAR W/DIFF WBC COUNT: CPT | Performed by: INTERNAL MEDICINE

## 2023-06-14 PROCEDURE — 86308 HETEROPHILE ANTIBODY SCREEN: CPT | Performed by: INTERNAL MEDICINE

## 2023-06-14 RX ORDER — DOXYCYCLINE 100 MG/1
100 CAPSULE ORAL EVERY 12 HOURS SCHEDULED
Qty: 24 CAPSULE | Refills: 0 | Status: SHIPPED | OUTPATIENT
Start: 2023-06-14 | End: 2023-06-26

## 2023-06-14 RX ADMIN — LEVOTHYROXINE SODIUM 75 MCG: 75 TABLET ORAL at 06:33

## 2023-06-14 RX ADMIN — DOXYCYCLINE 100 MG: 100 CAPSULE ORAL at 09:45

## 2023-06-14 NOTE — DISCHARGE SUMMARY
"    [facility] Medicine Services  DISCHARGE SUMMARY    Patient Name: Gypsy Mann  : 1970  MRN: [id number]    Date of Admission: 6/10/2023 10:06 AM  Date of Discharge:   Primary Care Physician: Genaro Casey MD    Consults     Date and Time Order Name Status Description    2023  7:05 AM Inpatient Infectious Diseases Consult Completed     6/10/2023  3:00 PM Inpatient Urology Consult Completed           Hospital Course       Active Hospital Problems    Diagnosis  POA   • **Acute febrile illness [R50.9]  Yes   • Ureteral stone [N20.1]  Yes      Resolved Hospital Problems   No resolved problems to display.          Hospital Course:  Gypsy Mann is a 53 y.o. female hypothyroidism, lumbar radiculopathy, insomnia, and kidney stones. Patient underwent recent right ureteral stone with hydronephrosis s/p urethral dilation, right ureteral stent placement and retrograde pyelogram with Dr. Pascual on . She was discharged on 10 days of Bactrim. She underwent laser lithotripsy with ureteral stent stent exchange on  and placed on several days of macrobid. She states she removed the stent herself on  and completed her macrobid on  when she began having fevers up to 102 mild abdominal \"bloating\" similar to symptoms she had when initially diagnosed w/ kidney stones. She called Urology office who advised her to go and get checked in the ED on 6/10. In ED wbc was 2.81. otherwise had normal labs including lipase. U/a benign. Resp pcr panel negative. Ct a/p revealed interval resolution of previously seen right ureteral calculus and hydronephrosis w/ mild right perinephric stranding (decreased from 23 scan).      Of note, patient reports recent travel to [place], came back , she reports having chills on the plane on the way back.  On arrival she had 4 days of diarrhea, and abdominal bloating, she presented to the ED and was then found with kidney " stone.        Persistent fever w/ Leukopenia  Recent Urologic Procedures   - UA benign, blood cultures NGTD, Resp PCR negative, cxr negative, procal negative  - CT scan appears improved from previous, no further ureteral calculus or hydro, perinephric stranding also improved  - stent removed by patient (follows w/ Dr. Pascual) Dr. Mccarthy followed, there are no plans for any surgical intervention at this time.    -ID consulted and followed, seen by Dr. Mejia, suspect tickborne illness, additional work-up has been sent these are pending as below.  She was initially on IV Rocephin, has now been transitioned to doxycycline, recommendation is to complete a 14-day course.  She will follow-up in clinic with repeat labs next week.  She has been advised to monitor her temperature curve, if worsens she will call the clinic.    -Cytopenias continue to improve, hold off on hematology consult/bone marrow biopsy at this time.      Hypothyroidism:  - continue levothyroxine     Chronic Pain:  - continue home pain medications    Discharge Follow Up Recommendations for outpatient labs/diagnostics:  Follow-up with PCP in 1 week  Follow-up with Dr. Mejia next week, tentatively Thursday 6/22    Day of Discharge     HPI: No acute events overnight, patient did have a one-time fever of Tmax 102, otherwise     ROS:  Gen- No fevers, chills  CV- No chest pain, palpitations  Resp- No cough, dyspnea  GI- +diarrhea, abd pain    Vital Signs:   Temp:  [98.2 °F (36.8 °C)-102.1 °F (38.9 °C)] 98.6 °F (37 °C)  Heart Rate:  [] 79  Resp:  [16-18] 16  BP: (107-120)/(62-75) 120/64      Physical Exam:  Constitutional: No acute distress, awake, alert  HENT: NCAT, mucous membranes moist  Respiratory: Clear to auscultation bilaterally, respiratory effort normal   Cardiovascular: RRR, no murmurs, rubs, or gallops  Gastrointestinal: Positive bowel sounds, soft, nontender, nondistended  Musculoskeletal: No bilateral ankle edema  Psychiatric:  Appropriate affect, cooperative  Neurologic: Oriented x 3, nonfocal  Skin: No rashes      Pertinent  and/or Most Recent Results     LAB RESULTS:      Lab 06/14/23  0540 06/13/23  0930 06/12/23 0440 06/11/23 0643 06/10/23  1022   WBC 2.96* 2.20* 1.97* 2.01* 2.81*   HEMOGLOBIN 9.2* 9.1* 9.4* 11.1* 11.3*   HEMATOCRIT 29.9* 28.9* 29.7* 35.6 36.1   PLATELETS 127* 110* 160 209 267   NEUTROS ABS 0.98* 0.79* 1.54* 1.33* 1.90   IMMATURE GRANS (ABS)  --   --   --   --  0.01   LYMPHS ABS  --   --   --   --  0.69*   MONOS ABS  --   --   --   --  0.18   EOS ABS 0.00 0.00 0.00 0.00 0.01   MCV 91.2 88.9 87.1 90.6 89.4   PROCALCITONIN  --   --   --   --  0.14   LACTATE  --   --   --   --  1.9         Lab 06/12/23  1406 06/12/23 0440 06/11/23  0643 06/10/23  1022   SODIUM  --  137 137 138   POTASSIUM 3.7 3.2* 3.8 3.8   CHLORIDE  --  106 102 103   CO2  --  22.0 28.0 23.0   ANION GAP  --  9.0 7.0 12.0   BUN  --  6 9 8   CREATININE  --  0.58 0.63 0.63   EGFR  --  107.7 105.6 105.6   GLUCOSE  --  106* 103* 102*   CALCIUM  --  7.7* 8.6 9.0         Lab 06/12/23  0440 06/11/23  0643 06/10/23  1022   TOTAL PROTEIN 5.1* 6.1 7.1   ALBUMIN 3.2* 3.7 4.1   GLOBULIN 1.9  --  3.0   ALT (SGPT) 24 17 9   AST (SGOT) 49* 31 18   BILIRUBIN 0.3 0.3 0.2   BILIRUBIN DIRECT  --  <0.2  --    ALK PHOS 92 106 105   LIPASE  --   --  37                     Brief Urine Lab Results  (Last result in the past 365 days)      Color   Clarity   Blood   Leuk Est   Nitrite   Protein   CREAT   Urine HCG        06/10/23 1023 Yellow   Clear   Negative   Negative   Negative   Negative               Microbiology Results (last 10 days)     Procedure Component Value - Date/Time    COVID PRE-OP / PRE-PROCEDURE SCREENING ORDER (NO ISOLATION) - Swab, Nasopharynx [[id number]]  (Normal) Collected: 06/10/23 1212    Lab Status: Final result Specimen: Swab from Nasopharynx Updated: 06/10/23 1318    Narrative:      The following orders were created for panel order COVID PRE-OP /  PRE-PROCEDURE SCREENING ORDER (NO ISOLATION) - Swab, Nasopharynx.  Procedure                               Abnormality         Status                     ---------                               -----------         ------                     Respiratory Panel PCR w/...[[id number]]  Normal              Final result                 Please view results for these tests on the individual orders.    Respiratory Panel PCR w/COVID-19(SARS-CoV-2) TOO/CHRISSY/C[facility]SAR/PAD/[facility] In-House, NP Swab in UTM/VTM, 3-4 HR TAT - Swab, Nasopharynx [[id number]]  (Normal) Collected: 06/10/23 1212    Lab Status: Final result Specimen: Swab from Nasopharynx Updated: 06/10/23 1318     ADENOVIRUS, PCR Not Detected     Coronavirus 229E Not Detected     Coronavirus HKU1 Not Detected     Coronavirus NL63 Not Detected     Coronavirus OC43 Not Detected     COVID19 Not Detected     Human Metapneumovirus Not Detected     Human Rhinovirus/Enterovirus Not Detected     Influenza A PCR Not Detected     Influenza B PCR Not Detected     Parainfluenza Virus 1 Not Detected     Parainfluenza Virus 2 Not Detected     Parainfluenza Virus 3 Not Detected     Parainfluenza Virus 4 Not Detected     RSV, PCR Not Detected     Bordetella pertussis pcr Not Detected     Bordetella parapertussis PCR Not Detected     Chlamydophila pneumoniae PCR Not Detected     Mycoplasma pneumo by PCR Not Detected    Narrative:      In the setting of a positive respiratory panel with a viral infection PLUS a negative procalcitonin without other underlying concern for bacterial infection, consider observing off antibiotics or discontinuation of antibiotics and continue supportive care. If the respiratory panel is positive for atypical bacterial infection (Bordetella pertussis, Chlamydophila pneumoniae, or Mycoplasma pneumoniae), consider antibiotic de-escalation to target atypical bacterial infection.    Blood Culture - Blood, Arm, Left [[id number]]  (Normal) Collected: 06/10/23 1207    Lab  Status: Preliminary result Specimen: Blood from Arm, Left Updated: 06/13/23 1216     Blood Culture No growth at 3 days    Urine Culture - Urine, Urine, Clean Catch [[id number]]  (Normal) Collected: 06/10/23 1023    Lab Status: Final result Specimen: Urine, Clean Catch Updated: 06/11/23 1201     Urine Culture No growth    Blood Culture - Blood, Arm, Left [[id number]]  (Normal) Collected: 06/10/23 1022    Lab Status: Preliminary result Specimen: Blood from Arm, Left Updated: 06/13/23 1045     Blood Culture No growth at 3 days          CT Abdomen Pelvis Without Contrast    Result Date: 6/10/2023  CT ABDOMEN PELVIS WO CONTRAST Date of Exam: 6/10/2023 10:49 AM EDT Indication: right flank pain, fever, recent stent Comparison: KUB 5/24/2023, outside CT abdomen/pelvis 5/22/2023 Technique: Axial CT images were obtained of the abdomen and pelvis without the administration of contrast. Reconstructed coronal and sagittal images were also obtained. Automated exposure control and iterative construction methods were used. Findings: Included lung bases are clear. No pleural effusion. Previously seen distal right ureteral calculus is no longer visualized, and there has been interval resolution of previously seen right hydronephrosis compared to 5/22/2023 CT with only mild residual dilatation of the right renal pelvis. There is mild right perinephric stranding, which also appears decreased. No nephroureterolithiasis. Adrenal glands, spleen, pancreas, liver, gallbladder appear unremarkable for noncontrast CT. No abnormal bowel distention is seen. Appendix is not visualized, but no right lower quadrant inflammation is noted. There are no urinary bladder calcifications.  Urinary bladder is incompletely distended. No significant free fluid or lymphadenopathy is noted. No acute osseous abnormality is identified. There is leftward curvature of the lumbar spine.     Impression: Resolution of previously seen right ureteral calculus and right  hydronephrosis. Mild right perinephric stranding, decreased compared to 5/22/2023 CT. Electronically Signed: Honey Rivasley  6/10/2023 11:05 AM EDT  Workstation ID: [id number]    XR Chest 1 View    Result Date: 6/10/2023  XR CHEST 1 VW Date of Exam: 6/10/2023 11:58 AM EDT Indication: Fever Comparison: CT abdomen and pelvis 6/10/2023. No previous chest imaging available for comparison. Findings: Lungs are well expanded and clear. No consolidation or large pleural effusion is seen. Cardiomediastinal contours appear within normal limits. There is rightward curvature of the thoracic spine.     Impression: No acute cardiopulmonary abnormality is identified. Electronically Signed: Honey Rivasley  6/10/2023 12:25 PM EDT  Workstation ID: [id number]    FL C Arm During Surgery    Result Date: 6/5/2023  This procedure was auto-finalized with no dictation required.                  Plan for Follow-up of Pending Labs/Results: ID  Pending Labs     Order Current Status    CMV DNA, Quantitative, PCR In process    Ehrlichia Profile DNA PCR In process    [place] SF (IgG / M) In process    West Nile Antibodies, IgG & IgM In process    Blood Culture - Blood, Arm, Left Preliminary result    Blood Culture - Blood, Arm, Left Preliminary result        Discharge Details        Discharge Medications      New Medications      Instructions Start Date   doxycycline 100 MG capsule  Commonly known as: MONODOX   100 mg, Oral, Every 12 Hours Scheduled         Continue These Medications      Instructions Start Date   ferrous sulfate 325 (65 Fe) MG tablet   325 mg, Oral, Daily With Breakfast, OTC      HYDROcodone-acetaminophen 5-325 MG per tablet  Commonly known as: Norco   1 tablet, Oral, Every 6 Hours PRN      Hyoscyamine Sulfate SL 0.125 MG sublingual tablet  Commonly known as: Levsin/SL   0.125 mg, Sublingual, Every 4 Hours PRN      levothyroxine 75 MCG tablet  Commonly known as: SYNTHROID, LEVOTHROID   75 mcg, Oral, Every Early Morning       multivitamin with minerals tablet tablet   1 tablet, Oral, Daily, OTC      zolpidem 10 MG tablet  Commonly known as: AMBIEN   10 mg, Oral, Nightly PRN             Allergies   Allergen Reactions   • Zofran [Ondansetron] Headache         Discharge Disposition: Home  Home or Self Care    Diet:  Hospital:  Diet Order   Procedures   • Diet: Regular/House Diet; Texture: Regular Texture (IDDSI 7); Fluid Consistency: Thin (IDDSI 0)       Activity: As tolerated      Restrictions or Other Recommendations:  None       CODE STATUS:    Code Status and Medical Interventions:   Ordered at: 06/10/23 1430     Level Of Support Discussed With:    Patient     Code Status (Patient has no pulse and is not breathing):    CPR (Attempt to Resuscitate)     Medical Interventions (Patient has pulse or is breathing):    Full Support       Future Appointments   Date Time Provider Department Center   8/3/2023 11:00 AM Parag Pascual MD [facility] Arredondo MD  06/14/23      Time Spent on Discharge:  I spent  35  minutes on this discharge activity which included: face-to-face encounter with the patient, reviewing the data in the system, coordination of the care with the nursing staff as well as consultants, documentation, and entering orders.

## 2023-06-14 NOTE — PROGRESS NOTES
[facility]   Urology Progress Note    Patient Name: Gypsy Mann  : 1970  MRN: [id number]  Primary Care Physician:  Genaro Casey MD  Date of admission: 6/10/2023    Subjective   Subjective     Chief Complaint: Fever/abdominal pain    HPI:  Patient resting in bed,  at bedside. Reports abdominal pain has improved. Continues to have intermittent headaches. TMAX yesterday evening 102.1. Afebrile overnight.     WBC 2.96.     Review of Systems   All systems were reviewed and negative except for: fever, headache    Objective   Objective     Vitals:   Temp:  [98.2 °F (36.8 °C)-102.1 °F (38.9 °C)] 98.6 °F (37 °C)  Heart Rate:  [] 79  Resp:  [16-18] 16  BP: (107-120)/(62-75) 120/64  Physical Exam    Constitutional: Awake in bed, alert, headache   Eyes: PERRLA, sclerae anicteric, no conjunctival injection   HENT: Normocephalic, atraumatic, mucous membranes moist   Neck: Supple, trachea midline   Respiratory: Equal chest rise, non-labored respirations    Cardiovascular: RRR   Gastrointestinal: Soft, nontender, non-distended   Genitourinary: Voiding, no flank pain.    Musculoskeletal: No lower extremity edema bilaterally, no clubbing or cyanosis to extremities   Psychiatric: Appropriate affect, cooperative   Neurologic: Oriented x 3,  Cranial Nerves grossly intact, speech clear   Skin: No rashes     Result Review    Result Review:  I have personally reviewed the results from the time of this admission to 2023 09:04 EDT and agree with these findings:  [x]  Laboratory  [x]  Microbiology  []  Radiology  []  EKG/Telemetry   []  Cardiology/Vascular   []  Pathology  []  Old records  [x]  Other: Vital signs    Most notable findings include: Urine culture no growth, WBC 2.96.     Assessment & Plan   Assessment / Plan     Brief Patient Summary:  Gypsy Mann is a 53 y.o. female who is s/p ureteroscopy, laser lithotripsy on  with Dr. Pascual and subsequently removed her ureteral stent at  home who presented to [facility] on 06/10 for worsening fever and abdominal bloating.     Active Hospital Problems:  Active Hospital Problems    Diagnosis    • **Acute febrile illness    • Ureteral stone        Plan:   -Leukopenia mildly improved.  -No Urologic surgical intervention.  -Labs/further workup per ID.   will be available, please call if any questions.   D/w Dr. Mccarthy.        DVT prophylaxis:  Medical DVT prophylaxis orders are present.    CODE STATUS:   Level Of Support Discussed With: Patient  Code Status (Patient has no pulse and is not breathing): CPR (Attempt to Resuscitate)  Medical Interventions (Patient has pulse or is breathing): Full Support    Disposition:  I expect patient to remain inpatient.    Electronically signed by CASS Mishra, 06/14/23, 9:04 AM EDT.

## 2023-06-14 NOTE — PLAN OF CARE
Goal Outcome Evaluation:              Outcome Evaluation: patuient alert and oriented reports feeling better afebrile eager for DC

## 2023-06-14 NOTE — PAYOR COMM NOTE
"Suyapa Caballero, LETI  Utilization Management  P:[phone]  F:[phone]    Auth# [id number]   Gypsy Mann (53 y.o. Female)       Date of Birth   1970    Social Security Number       Address   [address] [address]    Home Phone   [phone]    MRN   [id number]       Islam   Uatsdin    Marital Status                               Admission Date   6/10/23    Admission Type   Emergency    Admitting Provider   Abhi Arredondo MD    Attending Provider       Department, Room/Bed   [facility] 2F, S211/1       Discharge Date   2023    Discharge Disposition   Home or Self Care    Discharge Destination                                 Attending Provider: (none)   Allergies: Zofran [Ondansetron]    Isolation: None   Infection: None   Code Status: CPR    Ht: 160 cm (63\")   Wt: 63.5 kg (140 lb)    Admission Cmt: None   Principal Problem: Acute febrile illness [R50.9]                   Active Insurance as of 6/10/2023       Primary Coverage       Payor Plan Insurance Group Employer/Plan Group    [employer] P[employer] [id number]       Payor Plan Address Payor Plan Phone Number Payor Plan Fax Number Effective Dates    [address] [phone]  2017 - None Entered    [place] [address]         Subscriber Name Subscriber Birth Date Member ID       MACKENZIE BRANDIN AKHTAR MERCEDES 1970 [id number]                     Emergency Contacts        (Rel.) Home Phone Work Phone Mobile Phone    Mackenzie AkhtarBrandin LONG (Spouse) [phone] -- [phone]                 Discharge Summary        Abhi Arredondo MD at 23 0920              [facility]  DISCHARGE SUMMARY    Patient Name: Gypsy Mann  : 1970  MRN: [id number]    Date of Admission: 6/10/2023 10:06 AM  Date of Discharge:   Primary Care Physician: Genaro Casey MD    Consults       Date and Time Order Name Status " "Description    6/12/2023  7:05 AM Inpatient Infectious Diseases Consult Completed     6/10/2023  3:00 PM Inpatient Urology Consult Completed             Hospital Course       Active Hospital Problems    Diagnosis  POA    **Acute febrile illness [R50.9]  Yes    Ureteral stone [N20.1]  Yes      Resolved Hospital Problems   No resolved problems to display.          Hospital Course:  Gypsy Mann is a 53 y.o. female hypothyroidism, lumbar radiculopathy, insomnia, and kidney stones. Patient underwent recent right ureteral stone with hydronephrosis s/p urethral dilation, right ureteral stent placement and retrograde pyelogram with Dr. Pascual on 5/24. She was discharged on 10 days of Bactrim. She underwent laser lithotripsy with ureteral stent stent exchange on 6/5 and placed on several days of macrobid. She states she removed the stent herself on 6/7 and completed her macrobid on 6/9 when she began having fevers up to 102 mild abdominal \"bloating\" similar to symptoms she had when initially diagnosed w/ kidney stones. She called Urology office who advised her to go and get checked in the ED on 6/10. In ED wbc was 2.81. otherwise had normal labs including lipase. U/a benign. Resp pcr panel negative. Ct a/p revealed interval resolution of previously seen right ureteral calculus and hydronephrosis w/ mild right perinephric stranding (decreased from 5/22/23 scan).      Of note, patient reports recent travel to [place], came back 5/17, she reports having chills on the plane on the way back.  On arrival she had 4 days of diarrhea, and abdominal bloating, she presented to the ED and was then found with kidney stone.        Persistent fever w/ Leukopenia  Recent Urologic Procedures   - UA benign, blood cultures NGTD, Resp PCR negative, cxr negative, procal negative  - CT scan appears improved from previous, no further ureteral calculus or hydro, perinephric stranding also improved  - stent removed by patient (follows w/  " Samara) Dr. Mccarthy followed, there are no plans for any surgical intervention at this time.    -ID consulted and followed, seen by Dr. Mejia, suspect tickborne illness, additional work-up has been sent these are pending as below.  She was initially on IV Rocephin, has now been transitioned to doxycycline, recommendation is to complete a 14-day course.  She will follow-up in clinic with repeat labs next week.  She has been advised to monitor her temperature curve, if worsens she will call the clinic.    -Cytopenias continue to improve, hold off on hematology consult/bone marrow biopsy at this time.      Hypothyroidism:  - continue levothyroxine     Chronic Pain:  - continue home pain medications    Discharge Follow Up Recommendations for outpatient labs/diagnostics:  Follow-up with PCP in 1 week  Follow-up with Dr. Mejia next week, tentatively Thursday 6/22    Day of Discharge     HPI: No acute events overnight, patient did have a one-time fever of Tmax 102, otherwise     ROS:  Gen- No fevers, chills  CV- No chest pain, palpitations  Resp- No cough, dyspnea  GI- +diarrhea, abd pain    Vital Signs:   Temp:  [98.2 °F (36.8 °C)-102.1 °F (38.9 °C)] 98.6 °F (37 °C)  Heart Rate:  [] 79  Resp:  [16-18] 16  BP: (107-120)/(62-75) 120/64      Physical Exam:  Constitutional: No acute distress, awake, alert  HENT: NCAT, mucous membranes moist  Respiratory: Clear to auscultation bilaterally, respiratory effort normal   Cardiovascular: RRR, no murmurs, rubs, or gallops  Gastrointestinal: Positive bowel sounds, soft, nontender, nondistended  Musculoskeletal: No bilateral ankle edema  Psychiatric: Appropriate affect, cooperative  Neurologic: Oriented x 3, nonfocal  Skin: No rashes      Pertinent  and/or Most Recent Results     LAB RESULTS:      Lab 06/14/23  0540 06/13/23  0930 06/12/23  0440 06/11/23  0643 06/10/23  1022   WBC 2.96* 2.20* 1.97* 2.01* 2.81*   HEMOGLOBIN 9.2* 9.1* 9.4* 11.1* 11.3*   HEMATOCRIT 29.9*  28.9* 29.7* 35.6 36.1   PLATELETS 127* 110* 160 209 267   NEUTROS ABS 0.98* 0.79* 1.54* 1.33* 1.90   IMMATURE GRANS (ABS)  --   --   --   --  0.01   LYMPHS ABS  --   --   --   --  0.69*   MONOS ABS  --   --   --   --  0.18   EOS ABS 0.00 0.00 0.00 0.00 0.01   MCV 91.2 88.9 87.1 90.6 89.4   PROCALCITONIN  --   --   --   --  0.14   LACTATE  --   --   --   --  1.9         Lab 06/12/23  1406 06/12/23  0440 06/11/23  0643 06/10/23  1022   SODIUM  --  137 137 138   POTASSIUM 3.7 3.2* 3.8 3.8   CHLORIDE  --  106 102 103   CO2  --  22.0 28.0 23.0   ANION GAP  --  9.0 7.0 12.0   BUN  --  6 9 8   CREATININE  --  0.58 0.63 0.63   EGFR  --  107.7 105.6 105.6   GLUCOSE  --  106* 103* 102*   CALCIUM  --  7.7* 8.6 9.0         Lab 06/12/23  0440 06/11/23  0643 06/10/23  1022   TOTAL PROTEIN 5.1* 6.1 7.1   ALBUMIN 3.2* 3.7 4.1   GLOBULIN 1.9  --  3.0   ALT (SGPT) 24 17 9   AST (SGOT) 49* 31 18   BILIRUBIN 0.3 0.3 0.2   BILIRUBIN DIRECT  --  <0.2  --    ALK PHOS 92 106 105   LIPASE  --   --  37                     Brief Urine Lab Results  (Last result in the past 365 days)        Color   Clarity   Blood   Leuk Est   Nitrite   Protein   CREAT   Urine HCG        06/10/23 1023 Yellow   Clear   Negative   Negative   Negative   Negative                 Microbiology Results (last 10 days)       Procedure Component Value - Date/Time    COVID PRE-OP / PRE-PROCEDURE SCREENING ORDER (NO ISOLATION) - Swab, Nasopharynx [[id number]]  (Normal) Collected: 06/10/23 1212    Lab Status: Final result Specimen: Swab from Nasopharynx Updated: 06/10/23 1318    Narrative:      The following orders were created for panel order COVID PRE-OP / PRE-PROCEDURE SCREENING ORDER (NO ISOLATION) - Swab, Nasopharynx.  Procedure                               Abnormality         Status                     ---------                               -----------         ------                     Respiratory Panel PCR w/...[[id number]]  Normal              Final result                  Please view results for these tests on the individual orders.    Respiratory Panel PCR w/COVID-19(SARS-CoV-2) TOO/[facility]/C[facility]SAR/PAD/COR/MAD/PARISH In-House, NP Swab in UTM/VTM, 3-4 HR TAT - Swab, Nasopharynx [[id number]]  (Normal) Collected: 06/10/23 1212    Lab Status: Final result Specimen: Swab from Nasopharynx Updated: 06/10/23 1318     ADENOVIRUS, PCR Not Detected     Coronavirus 229E Not Detected     Coronavirus HKU1 Not Detected     Coronavirus NL63 Not Detected     Coronavirus OC43 Not Detected     COVID19 Not Detected     Human Metapneumovirus Not Detected     Human Rhinovirus/Enterovirus Not Detected     Influenza A PCR Not Detected     Influenza B PCR Not Detected     Parainfluenza Virus 1 Not Detected     Parainfluenza Virus 2 Not Detected     Parainfluenza Virus 3 Not Detected     Parainfluenza Virus 4 Not Detected     RSV, PCR Not Detected     Bordetella pertussis pcr Not Detected     Bordetella parapertussis PCR Not Detected     Chlamydophila pneumoniae PCR Not Detected     Mycoplasma pneumo by PCR Not Detected    Narrative:      In the setting of a positive respiratory panel with a viral infection PLUS a negative procalcitonin without other underlying concern for bacterial infection, consider observing off antibiotics or discontinuation of antibiotics and continue supportive care. If the respiratory panel is positive for atypical bacterial infection (Bordetella pertussis, Chlamydophila pneumoniae, or Mycoplasma pneumoniae), consider antibiotic de-escalation to target atypical bacterial infection.    Blood Culture - Blood, Arm, Left [[id number]]  (Normal) Collected: 06/10/23 1207    Lab Status: Preliminary result Specimen: Blood from Arm, Left Updated: 06/13/23 1216     Blood Culture No growth at 3 days    Urine Culture - Urine, Urine, Clean Catch [[id number]]  (Normal) Collected: 06/10/23 1023    Lab Status: Final result Specimen: Urine, Clean Catch Updated: 06/11/23 1201     Urine Culture No growth     Blood Culture - Blood, Arm, Left [[id number]]  (Normal) Collected: 06/10/23 1022    Lab Status: Preliminary result Specimen: Blood from Arm, Left Updated: 06/13/23 1045     Blood Culture No growth at 3 days            CT Abdomen Pelvis Without Contrast    Result Date: 6/10/2023  CT ABDOMEN PELVIS WO CONTRAST Date of Exam: 6/10/2023 10:49 AM EDT Indication: right flank pain, fever, recent stent Comparison: KUB 5/24/2023, outside CT abdomen/pelvis 5/22/2023 Technique: Axial CT images were obtained of the abdomen and pelvis without the administration of contrast. Reconstructed coronal and sagittal images were also obtained. Automated exposure control and iterative construction methods were used. Findings: Included lung bases are clear. No pleural effusion. Previously seen distal right ureteral calculus is no longer visualized, and there has been interval resolution of previously seen right hydronephrosis compared to 5/22/2023 CT with only mild residual dilatation of the right renal pelvis. There is mild right perinephric stranding, which also appears decreased. No nephroureterolithiasis. Adrenal glands, spleen, pancreas, liver, gallbladder appear unremarkable for noncontrast CT. No abnormal bowel distention is seen. Appendix is not visualized, but no right lower quadrant inflammation is noted. There are no urinary bladder calcifications.  Urinary bladder is incompletely distended. No significant free fluid or lymphadenopathy is noted. No acute osseous abnormality is identified. There is leftward curvature of the lumbar spine.     Impression: Resolution of previously seen right ureteral calculus and right hydronephrosis. Mild right perinephric stranding, decreased compared to 5/22/2023 CT. Electronically Signed: Honey Pro  6/10/2023 11:05 AM EDT  Workstation ID: [id number]    XR Chest 1 View    Result Date: 6/10/2023  XR CHEST 1 VW Date of Exam: 6/10/2023 11:58 AM EDT Indication: Fever Comparison: CT abdomen and pelvis  6/10/2023. No previous chest imaging available for comparison. Findings: Lungs are well expanded and clear. No consolidation or large pleural effusion is seen. Cardiomediastinal contours appear within normal limits. There is rightward curvature of the thoracic spine.     Impression: No acute cardiopulmonary abnormality is identified. Electronically Signed: Honey Pro  6/10/2023 12:25 PM EDT  Workstation ID: [id number]    FL C Arm During Surgery    Result Date: 6/5/2023  This procedure was auto-finalized with no dictation required.                  Plan for Follow-up of Pending Labs/Results: ID  Pending Labs       Order Current Status    CMV DNA, Quantitative, PCR In process    Ehrlichia Profile DNA PCR In process    [facility] (IgG / M) In process    West Nile Antibodies, IgG & IgM In process    Blood Culture - Blood, Arm, Left Preliminary result    Blood Culture - Blood, Arm, Left Preliminary result          Discharge Details        Discharge Medications        New Medications        Instructions Start Date   doxycycline 100 MG capsule  Commonly known as: MONODOX   100 mg, Oral, Every 12 Hours Scheduled             Continue These Medications        Instructions Start Date   ferrous sulfate 325 (65 Fe) MG tablet   325 mg, Oral, Daily With Breakfast, OTC      HYDROcodone-acetaminophen 5-325 MG per tablet  Commonly known as: Norco   1 tablet, Oral, Every 6 Hours PRN      Hyoscyamine Sulfate SL 0.125 MG sublingual tablet  Commonly known as: Levsin/SL   0.125 mg, Sublingual, Every 4 Hours PRN      levothyroxine 75 MCG tablet  Commonly known as: SYNTHROID, LEVOTHROID   75 mcg, Oral, Every Early Morning      multivitamin with minerals tablet tablet   1 tablet, Oral, Daily, OTC      zolpidem 10 MG tablet  Commonly known as: AMBIEN   10 mg, Oral, Nightly PRN               Allergies   Allergen Reactions    Zofran [Ondansetron] Headache         Discharge Disposition: Home  Home or Self Care    Diet:  Hospital:  Diet  Order   Procedures    Diet: Regular/House Diet; Texture: Regular Texture (IDDSI 7); Fluid Consistency: Thin (IDDSI 0)       Activity: As tolerated      Restrictions or Other Recommendations:  None       CODE STATUS:    Code Status and Medical Interventions:   Ordered at: 06/10/23 1430     Level Of Support Discussed With:    Patient     Code Status (Patient has no pulse and is not breathing):    CPR (Attempt to Resuscitate)     Medical Interventions (Patient has pulse or is breathing):    Full Support       Future Appointments   Date Time Provider Department Center   8/3/2023 11:00 AM Parag Pascual MD [facility]       Abhi Arredondo MD  06/14/23      Time Spent on Discharge:  I spent  35  minutes on this discharge activity which included: face-to-face encounter with the patient, reviewing the data in the system, coordination of the care with the nursing staff as well as consultants, documentation, and entering orders.          Electronically signed by Abhi Arredondo MD at 06/14/23 1994

## 2023-06-14 NOTE — PROGRESS NOTES
INFECTIOUS DISEASE progress note    Gypsy Mann  1970  [id number]    Date of consult: 6/12/23  Admit date: 6/10/2023    Requesting Provider: Dr. Arredondo  Evaluating physician: Bubba Mejia MD  Reason for Consultation: Persistent fevers and leukopenia  Chief Complaint: fever      Subjective   History of present illness:  Gypsy Mann is a  53 y.o.  Yr old female with a past medical history of hypothyroidism, lumbar radiculopathy, and a recent right ureteral stone with hydronephrosis status post cystoscopy, right retrograde pyelogram, and right ureteral stent placement on 5/24/23 by Dr. Pascual. The patient was discharged on 10 days of oral Bactrim. Urine culture from 5/24 grew 25,000 CFU per milliliter E. Coli that was pan susceptible. She underwent laser lithotripsy and ureteral stent exchange on 6/5 and subsequently received several days of Macrobid.  She removed the stent herself on Wednesday, 6/7 and finished her antibiotics on 6/9.  She subsequently started having fevers up to 102°F and fatigue. She was also having some mild ongoing flank pain. She called the urology office who advised her to go to the ER and she was subsequently admitted on 6/10/23. Since arrival, the patient has been experiencing ongoing fevers up to 102.5°F last 24 hours. She has also been leukopenic with a decreasing white blood cell count down to 1.97 today with an absolute neutrophil count of 1540. Platelet count is within normal limits. Hemoglobin is worse today at 9.4. She also has a bandemia of 18%, down from 30% yesterday. Creatinine is stable at 0.58.  LFTs are mostly normal with a slight elevation of the AST. Chest x-ray is without any acute cardiopulmonary abnormalities.  CT abdomen and pelvis from 6/10 showed resolution of previously seen right ureteral calculus and right hydronephrosis.  Mild right perinephric stranding, decreased compared to 5/22 CT scan. Blood cultures from 6/10 are no growth to date.  Urine  culture from 6/10 was no growth final.  Respiratory PCR panel was negative. The patient has been on ceftriaxone 1 g IV every 24 hours since arrival. Urology has evaluated the patient and no urologic surgical intervention is indicated. Hematology consult is being considered. ID consult has been placed for evaluation of her persistent fevers and worsening leukopenia.    Of note, the patient recently traveled to [place] in May and started feeling bloated and started feeling poorly shortly after. She did have some diarrhea shortly after returning from [place] but this is since improved. She being and having intermittent fevers couple weeks ago.  She does report a tick exposure shortly after getting back from [place] and her  was also exposed to ticks and he did empirically start doxycycline but she has not been on any doxycycline.  She does not report any rashes.  She did have a fever blister towards the end of her trip pain in her mouth with some mild bleeding but this has since resolved.  She is having some headaches intermittently.  She denies any severe shortness of breath and only has a mild cough since arrival to the hospital.  She continues to have some abdominal bloating but no severe pain.  Her right flank pain has improved.  No joint swelling or tenderness.  No known sick contacts.  She denies eating any raw or undercooked food but did eat out at restaurants while in [place].  No known mosquito exposures.    Subjective:    6/13/13: The patient's fevers seem better this morning but she did subsequently have a fever over 10 2°F later today after my exam.  She had one episode of diarrhea yesterday but subsequently had a formed bowel movement.  No urinary symptoms or flank pain.  No oral lesions/ulcers.  She is still having some ongoing headaches. No new rashes reported.    Past Medical History:   Diagnosis Date   • Back pain    • Bronchitis    • Hypothyroid    • Insomnia    • Kidney stones    • Lumbosacral  disc disease Aug 2022       Past Surgical History:   Procedure Laterality Date   • BACK SURGERY  2022    L5/S1 Diskectomy   • BILATERAL BREAST REDUCTION Bilateral 10/31/2019    Procedure: BREAST REDUCTION BILATERAL;  Surgeon: Francis Galvan MD;  Location:  [facility];  Service: Plastics   •  SECTION      x two   • COLONOSCOPY     • CYSTOSCOPY, URETEROSCOPY, RETROGRADE PYELOGRAM, STONE EXTRACTION, STENT INSERTION Right 2023    Procedure: CYSTOSCOPY RIGHT RETROGRADE PYELOGRAM AND RIGHT URETERAL STENT PLACEMENT;  Surgeon: Parag Pascual MD;  Location:  [facility];  Service: Urology;  Laterality: Right;   • EPIDURAL BLOCK  22 and 22   • LUMBAR DISCECTOMY Right 2022    Procedure: LUMBAR DISCECTOMY L5-S1 RIGHT;  Surgeon: Yared Zapien MD;  Location:  [facility];  Service: Neurosurgery;  Laterality: Right;   • REDUCTION MAMMAPLASTY Bilateral        • URETEROSCOPY LASER LITHOTRIPSY WITH STENT INSERTION Right 2023    Procedure: URETEROSCOPY LASER LITHOTRIPSY WITH STENT INSERTION - RIGHT;  Surgeon: Parag Pascual MD;  Location:  [facility];  Service: Urology;  Laterality: Right;       Pediatric History   Patient Parents   • Not on file     Other Topics Concern   • Not on file   Social History Narrative    Works at [employer]   the patient is  and has a daughter. she recently traveled to [place] and started feeling poorly shortly after. She did eat out at some restaurants while in [place] but no reported wrong or undercooked food. She was exposed to ticks shortly after arriving back from [place]. No known mosquito exposures.  No known sick Contacts.  No known tuberculosis exposures.    family history includes COPD in her father; Cancer in her maternal grandmother and mother; Diabetes in her son; Emphysema in her father; Heart disease in her father; Hyperlipidemia in her father; Hypertension in her father; Thyroid disease in her mother.    Allergies    Allergen Reactions   • Zofran [Ondansetron] Headache       Immunization History   Administered Date(s) Administered   • COVID-19 (PFIZER) Purple Cap Monovalent 12/21/2020, 01/11/2021, 10/06/2021       Medication:    Current Facility-Administered Medications:   •  acetaminophen (TYLENOL) tablet 650 mg, 650 mg, Oral, Q4H PRN, Petrona Asher DO, 650 mg at 06/13/23 1829  •  sennosides-docusate (PERICOLACE) 8.6-50 MG per tablet 2 tablet, 2 tablet, Oral, BID, 2 tablet at 06/12/23 0907 **AND** polyethylene glycol (MIRALAX) packet 17 g, 17 g, Oral, Daily PRN **AND** bisacodyl (DULCOLAX) EC tablet 5 mg, 5 mg, Oral, Daily PRN **AND** bisacodyl (DULCOLAX) suppository 10 mg, 10 mg, Rectal, Daily PRN, Petroan Asher, DO  •  Calcium Replacement - Follow Nurse / BPA Driven Protocol, , Does not apply, PRN, Petrona Asher, DO  •  doxycycline (MONODOX) capsule 100 mg, 100 mg, Oral, Q12H, Bubba Mejia MD, 100 mg at 06/13/23 2043  •  Enoxaparin Sodium (LOVENOX) syringe 40 mg, 40 mg, Subcutaneous, Daily, Petrona Asher DO, 40 mg at 06/13/23 0834  •  HYDROcodone-acetaminophen (NORCO) 5-325 MG per tablet 1 tablet, 1 tablet, Oral, Q6H PRN, Petrona Asher DO  •  levothyroxine (SYNTHROID, LEVOTHROID) tablet 75 mcg, 75 mcg, Oral, Q AM, Petrona Asher DO, 75 mcg at 06/13/23 0504  •  Magnesium Standard Dose Replacement - Follow Nurse / BPA Driven Protocol, , Does not apply, PRN, Petrona Asher, DO  •  melatonin tablet 10 mg, 10 mg, Oral, Nightly PRN, Willow Muniz, APRN, 10 mg at 06/11/23 0139  •  ondansetron (ZOFRAN) injection 4 mg, 4 mg, Intravenous, Q6H PRN, Petrona Asher, DO  •  Phosphorus Replacement - Follow Nurse / BPA Driven Protocol, , Does not apply, PRN, Petrona Asher, DO  •  Potassium Replacement - Follow Nurse / BPA Driven Protocol, , Does not apply, PRN, Petrona Asher, DO  •  promethazine (PHENERGAN) 12.5 mg in sodium chloride 0.9 % 50 mL, 12.5 mg, Intravenous, Q6H PRN, Lb,  "Petrona R, DO, Last Rate: 0 mL/hr at 06/10/23 1135, 12.5 mg at 06/11/23 1326  •  Sodium Chloride (PF) 0.9 % 10 mL, 10 mL, Intravenous, PRN, Petrona Asher R, DO, 10 mL at 06/10/23 1146  •  sodium chloride 0.9 % flush 10 mL, 10 mL, Intravenous, Q12H, LbPetrona han R, DO, 10 mL at 06/13/23 2044  •  sodium chloride 0.9 % flush 10 mL, 10 mL, Intravenous, PRN, LbPetrona han R, DO  •  sodium chloride 0.9 % infusion 40 mL, 40 mL, Intravenous, PRN, Petrona Asher R, DO  •  sodium chloride 0.9 % infusion, 100 mL/hr, Intravenous, Continuous, Ata Mcguire MD, Last Rate: 100 mL/hr at 06/13/23 2044, 100 mL/hr at 06/13/23 2044  •  traMADol (ULTRAM) tablet 50 mg, 50 mg, Oral, Q6H PRN, Abhi Arredondo MD  •  zolpidem (AMBIEN) tablet 5 mg, 5 mg, Oral, Nightly PRN, Ata Mcguire MD, 5 mg at 06/13/23 2043    Please refer to the medical record for a full medication list    Review of Systems:    As above    Physical Exam:   Vital Signs   Temp:  [98.7 °F (37.1 °C)-102.1 °F (38.9 °C)] 100 °F (37.8 °C)  Heart Rate:  [] 81  Resp:  [18] 18  BP: ()/(51-71) 108/62    Temp  Min: 98.7 °F (37.1 °C)  Max: 102.1 °F (38.9 °C)  BP  Min: 97/51  Max: 110/71  Pulse  Min: 69  Max: 107  Resp  Min: 18  Max: 18  SpO2  Min: 94 %  Max: 100 %    Blood pressure 108/62, pulse 81, temperature 100 °F (37.8 °C), temperature source Oral, resp. rate 18, height 160 cm (63\"), weight 63.5 kg (140 lb), SpO2 96 %.  GENERAL: Awake and alert, in no acute distress. Appears stated age.  Frail  HEENT:  Normocephalic, atraumatic.  Oropharynx without thrush. No oral lesions or labial ulcers.  EYES: pupils equal and round.  No conjunctival injection.  Anicteric.  HEART: RRR, no murmur  LUNGS: CTA B.  Nonlabored breathing on room air  ABDOMEN: Soft, nontender, nondistended. No appreciable HSM.    MSK: No joint effusions noted.  Full range of motion throughout.  No tenderness to palpation directly over her spine.  No CVA tenderness  GENITAL: no Barnes " catheter  SKIN: no generalized rashes.  No peripheral stigmata of infective endocarditis noted.  PSYCHIATRIC: cooperative.  Appropriate mood and affect  EXT:  No cellulitic change.  NEURO: awake alert and oriented ×4.  Normal speech and cognition    Left upper external peripheral IV site without any erythema    Results Review:   I reviewed the patient's new clinical results.    Results from last 7 days   Lab Units 06/13/23  0930 06/12/23  0440 06/11/23  0643   WBC 10*3/mm3 2.20* 1.97* 2.01*   HEMOGLOBIN g/dL 9.1* 9.4* 11.1*   HEMATOCRIT % 28.9* 29.7* 35.6   PLATELETS 10*3/mm3 110* 160 209     Results from last 7 days   Lab Units 06/12/23  1406 06/12/23  0440   SODIUM mmol/L  --  137   POTASSIUM mmol/L 3.7 3.2*   CHLORIDE mmol/L  --  106   CO2 mmol/L  --  22.0   BUN mg/dL  --  6   CREATININE mg/dL  --  0.58   GLUCOSE mg/dL  --  106*   CALCIUM mg/dL  --  7.7*     Results from last 7 days   Lab Units 06/12/23  0440 06/11/23  0643   ALK PHOS U/L 92 106   BILIRUBIN mg/dL 0.3 0.3   BILIRUBIN DIRECT mg/dL  --  <0.2   ALT (SGPT) U/L 24 17   AST (SGOT) U/L 49* 31                 Results from last 7 days   Lab Units 06/10/23  1022   LACTATE mmol/L 1.9     Estimated Creatinine Clearance: 99.4 mL/min (by C-G formula based on SCr of 0.58 mg/dL).  CPK        5/24/2023    07:51   Common Labsle   Creatine Kinase 27       Procalitonin Results:      Lab 06/10/23  1022   PROCALCITONIN 0.14      Brief Urine Lab Results  (Last result in the past 365 days)      Color   Clarity   Blood   Leuk Est   Nitrite   Protein   CREAT   Urine HCG        06/10/23 1023 Yellow   Clear   Negative   Negative   Negative   Negative                No results found for: SITE, ALLENTEST, PHART, KCK4WYI, PO2ART, BYO0OLL, BASEEXCESS, Y9AQWFFO, HGBBG, HCTABG, OXYHEMOGLOBI, METHHGBN, CARBOXYHGB, CO2CT, BAROMETRIC, MODALITY, FIO2     Microbiology:  Blood Culture   Date Value Ref Range Status   06/10/2023 No growth at 24 hours  Preliminary       Urine Culture  "  Date Value Ref Range Status   06/10/2023 No growth  Final     No results found for: VIRALCULTU, WOUNDCX     Blood Culture   Date Value Ref Range Status   06/10/2023 No growth at 24 hours  Preliminary     Urine Culture   Date Value Ref Range Status   06/10/2023 No growth  Final   (      Radiology:  Imaging Results (Last 72 Hours)     ** No results found for the last 72 hours. **          IMPRESSION:     Problems:  1. Sepsis with ongoing fevers, intermittent tachycardia, worsening leukopenia, and bandemia. Source remains unclear.  Did have a recent urinary tract infection on 5/24 which should be covered by ceftriaxone.  Macrobid does not get into the upper urinary tract but may have sterilized her lower urinary tract.  Repeat urine culture on admission was no growth.  Blood cultures from 6/10 are no growth to date.  CT abdomen and pelvis without contrast from 6/10 showed mild right perinephric stranding but decreased from prior CT scan on 5/22. No other acute intra-abdominal process noted. Respiratory PCR panel was negative. She does report recent exposure to ticks. No raw or undercooked food although she did eat out at restaurants while in [place] prior to her symptoms starting. Did have some initial diarrhea, nausea, and vomiting in May but these symptoms have since resolved. Could be ehrlichiosis. RMSF seems less likely. Other viral process is a possibility although no known sick contacts. She did have recent \"fever blister\" and she is having headache so HSV is possible but mouth sore has resolved and she has no nuchal rigidity or neck pain. WNV or other arboviral infection is possible but no reported exposure to mosquitos recently. Disseminated fungal infection is possible and could infiltrate bone marrow but seems less likely. A non-infectious process such as rheumatologic process or hematologic malignancy remains possible. Delayed drug reaction possible but no rash. If cytopenias worse then she may need a " hematology evaluate with consideration for a BM biopsy.   2. Leukopenia/neutropenia-slightly better today  3. Thrombocytopenia- new.  Platelet count is decreasing  4. Normocytic anemia-worsening  5. Recent E. Coli urinary tract infection  6. hypothyroidism    RECOMMENDATIONS:    -continue to follow pending blood cultures from 6/10-no growth to date  -continue to monitor off broad-spectrum antibiotics for now  -sent WNV IgM, Ehrlichia PCR, and RMSF serologies-pending  -sent Monospot and CMV PCR from a blood  -continue doxycycline 100 mg PO BID  -continue to closely monitor cbc with diff. If cytopenias continue to worsen and fevers are ongoing then we will likely need to consider hematology consultation with possible BM biopsy.  -if she has ongoing fevers and headache then we could consider a lumbar puncture but not currently having any nuchal rigidity cyst suspicion remains relatively low for meningitis.  HSV meningitis is a possibility given her recent oral ulcer reported but her oral ulcer has resolved.  -we could consider a Karius test from her blood if she continue to have fevers and diagnosis remains unclear    I discussed in length with the patient and her daughter at bedside today    I discussed with Dr. Arredondo again today    Thank you for asking me to see Gypsy Mann.  Our group would be pleased to follow this patient over the course of their hospitalization and assist with outpatient antimicrobial therapy, as indicated.  Further recommendations depend on the results of the cultures and clinical course.    Complex MDM    Bubba Mejia MD  6/13/2023

## 2023-06-14 NOTE — PLAN OF CARE
Goal Outcome Evaluation:              Outcome Evaluation: VSS on RA. Temperature improved overnight without tylenol. Complaint of headache, pt did not want medication. Fluids infusing. No further concerns at this time.

## 2023-06-14 NOTE — CASE MANAGEMENT/SOCIAL WORK
Continued Stay Note  [facility]     Patient Name: Gypsy Mann  MRN: [id number]  Today's Date: 6/14/2023    Admit Date: 6/10/2023    Plan: [facility] eval   Discharge Plan     Row Name 06/14/23 1509       Plan    Final Discharge Disposition Code 01 - home or self-care               Discharge Codes    No documentation.               Expected Discharge Date and Time     Expected Discharge Date Expected Discharge Time    Jun 14, 2023             Linda Aragon RN

## 2023-06-15 DIAGNOSIS — D72.819 LEUKOPENIA, UNSPECIFIED TYPE: Primary | ICD-10-CM

## 2023-06-15 LAB
BACTERIA SPEC AEROBE CULT: NORMAL
BACTERIA SPEC AEROBE CULT: NORMAL

## 2023-06-15 NOTE — OUTREACH NOTE
Prep Survey      Flowsheet Row Responses   [facility] facility patient discharged from? [facility]   Is LACE score < 7 ? No   Eligibility Readm Mgmt   Discharge diagnosis Acute febrile illness   Ureteral stone   Does the patient have one of the following disease processes/diagnoses(primary or secondary)? Other   Does the patient have Home health ordered? No   Is there a DME ordered? No   Prep survey completed? Yes            MARGIE MAURO - Registered Nurse

## 2023-06-15 NOTE — PROGRESS NOTES
INFECTIOUS DISEASE progress note    Gypsy Mann  1970  [id number]    Date of consult: 6/12/23  Admit date: 6/10/2023    Requesting Provider: Dr. Arredondo  Evaluating physician: Bubba Mejia MD  Reason for Consultation: Persistent fevers and leukopenia  Chief Complaint: fever      Subjective   History of present illness:  Gypsy Mann is a  53 y.o.  Yr old female with a past medical history of hypothyroidism, lumbar radiculopathy, and a recent right ureteral stone with hydronephrosis status post cystoscopy, right retrograde pyelogram, and right ureteral stent placement on 5/24/23 by Dr. Pascual. The patient was discharged on 10 days of oral Bactrim. Urine culture from 5/24 grew 25,000 CFU per milliliter E. Coli that was pan susceptible. She underwent laser lithotripsy and ureteral stent exchange on 6/5 and subsequently received several days of Macrobid.  She removed the stent herself on Wednesday, 6/7 and finished her antibiotics on 6/9.  She subsequently started having fevers up to 102°F and fatigue. She was also having some mild ongoing flank pain. She called the urology office who advised her to go to the ER and she was subsequently admitted on 6/10/23. Since arrival, the patient has been experiencing ongoing fevers up to 102.5°F last 24 hours. She has also been leukopenic with a decreasing white blood cell count down to 1.97 today with an absolute neutrophil count of 1540. Platelet count is within normal limits. Hemoglobin is worse today at 9.4. She also has a bandemia of 18%, down from 30% yesterday. Creatinine is stable at 0.58.  LFTs are mostly normal with a slight elevation of the AST. Chest x-ray is without any acute cardiopulmonary abnormalities.  CT abdomen and pelvis from 6/10 showed resolution of previously seen right ureteral calculus and right hydronephrosis.  Mild right perinephric stranding, decreased compared to 5/22 CT scan. Blood cultures from 6/10 are no growth to date.  Urine  culture from 6/10 was no growth final.  Respiratory PCR panel was negative. The patient has been on ceftriaxone 1 g IV every 24 hours since arrival. Urology has evaluated the patient and no urologic surgical intervention is indicated. Hematology consult is being considered. ID consult has been placed for evaluation of her persistent fevers and worsening leukopenia.    Of note, the patient recently traveled to [place] in May and started feeling bloated and started feeling poorly shortly after. She did have some diarrhea shortly after returning from [place] but this is since improved. She being and having intermittent fevers couple weeks ago.  She does report a tick exposure shortly after getting back from [place] and her  was also exposed to ticks and he did empirically start doxycycline but she has not been on any doxycycline.  She does not report any rashes.  She did have a fever blister towards the end of her trip pain in her mouth with some mild bleeding but this has since resolved.  She is having some headaches intermittently.  She denies any severe shortness of breath and only has a mild cough since arrival to the hospital.  She continues to have some abdominal bloating but no severe pain.  Her right flank pain has improved.  No joint swelling or tenderness.  No known sick contacts.  She denies eating any raw or undercooked food but did eat out at restaurants while in [place].  No known mosquito exposures.    Subjective:    6/13/23: The patient's fevers seem better this morning but she did subsequently have a fever over 10 2°F later today after my exam.  She had one episode of diarrhea yesterday but subsequently had a formed bowel movement.  No urinary symptoms or flank pain.  No oral lesions/ulcers.  She is still having some ongoing headaches. No new rashes reported.    6/14/23: the patient states that her headaches are better today. She did have one fever overnight up to 102.1°F but is afebrile this  morning and feeling better.  She states that her headaches are better.  Her cytopenias are improving.  No new rashes noted.  Not having any severe diarrhea.  No abdominal pain.  No urinary complaints or flank pain.    Past Medical History:   Diagnosis Date   • Back pain    • Bronchitis    • Hypothyroid    • Insomnia    • Kidney stones    • Lumbosacral disc disease Aug 2022       Past Surgical History:   Procedure Laterality Date   • BACK SURGERY  2022    L5/S1 Diskectomy   • BILATERAL BREAST REDUCTION Bilateral 10/31/2019    Procedure: BREAST REDUCTION BILATERAL;  Surgeon: Francis Galvan MD;  Location:  [facility];  Service: Plastics   •  SECTION      x two   • COLONOSCOPY     • CYSTOSCOPY, URETEROSCOPY, RETROGRADE PYELOGRAM, STONE EXTRACTION, STENT INSERTION Right 2023    Procedure: CYSTOSCOPY RIGHT RETROGRADE PYELOGRAM AND RIGHT URETERAL STENT PLACEMENT;  Surgeon: Parag Pascual MD;  Location:  [facility];  Service: Urology;  Laterality: Right;   • EPIDURAL BLOCK  22 and 22   • LUMBAR DISCECTOMY Right 2022    Procedure: LUMBAR DISCECTOMY L5-S1 RIGHT;  Surgeon: Yared Zapien MD;  Location:  [facility];  Service: Neurosurgery;  Laterality: Right;   • REDUCTION MAMMAPLASTY Bilateral        • URETEROSCOPY LASER LITHOTRIPSY WITH STENT INSERTION Right 2023    Procedure: URETEROSCOPY LASER LITHOTRIPSY WITH STENT INSERTION - RIGHT;  Surgeon: Parag Pascual MD;  Location:  [facility];  Service: Urology;  Laterality: Right;       Pediatric History   Patient Parents   • Not on file     Other Topics Concern   • Not on file   Social History Narrative    Works at [employer]   the patient is  and has a daughter. she recently traveled to [place] and started feeling poorly shortly after. She did eat out at some restaurants while in [place] but no reported wrong or undercooked food. She was exposed to ticks shortly after arriving back from [place]. No known  mosquito exposures.  No known sick Contacts.  No known tuberculosis exposures.    family history includes COPD in her father; Cancer in her maternal grandmother and mother; Diabetes in her son; Emphysema in her father; Heart disease in her father; Hyperlipidemia in her father; Hypertension in her father; Thyroid disease in her mother.    Allergies   Allergen Reactions   • Zofran [Ondansetron] Headache       Immunization History   Administered Date(s) Administered   • COVID-19 (PFIZER) Purple Cap Monovalent 12/21/2020, 01/11/2021, 10/06/2021       Medication:  No current facility-administered medications for this encounter.    Current Outpatient Medications:   •  doxycycline (MONODOX) 100 MG capsule, Take 1 capsule by mouth Every 12 (Twelve) Hours for 24 doses. Indications: Infection with Dysregulated Inflammatory Response, Disp: 24 capsule, Rfl: 0  •  ferrous sulfate 325 (65 Fe) MG tablet, Take 1 tablet by mouth Daily With Breakfast. OTC, Disp: , Rfl:   •  HYDROcodone-acetaminophen (Norco) 5-325 MG per tablet, Take 1 tablet by mouth Every 6 (Six) Hours As Needed for Severe Pain., Disp: 8 tablet, Rfl: 0  •  Hyoscyamine Sulfate SL (Levsin/SL) 0.125 MG sublingual tablet, Place 0.125 mg under the tongue Every 4 (Four) Hours As Needed (Breakthrough bladder spasms)., Disp: 30 each, Rfl: 0  •  levothyroxine (SYNTHROID, LEVOTHROID) 75 MCG tablet, Take 1 tablet by mouth Every Morning., Disp: , Rfl:   •  multivitamin with minerals tablet tablet, Take 1 tablet by mouth Daily. OTC, Disp: , Rfl:   •  zolpidem (AMBIEN) 10 MG tablet, Take 1 tablet by mouth At Night As Needed for Sleep., Disp: , Rfl:     Please refer to the medical record for a full medication list    Review of Systems:    As above    Physical Exam:   Vital Signs   Temp:  [98.2 °F (36.8 °C)-98.9 °F (37.2 °C)] 98.6 °F (37 °C)  Heart Rate:  [70-86] 79  Resp:  [16-18] 16  BP: (110-120)/(64-75) 120/64    Temp  Min: 98.2 °F (36.8 °C)  Max: 98.9 °F (37.2 °C)  BP  Min:  "110/68  Max: 120/64  Pulse  Min: 70  Max: 86  Resp  Min: 16  Max: 18  SpO2  Min: 95 %  Max: 97 %    Blood pressure 120/64, pulse 79, temperature 98.6 °F (37 °C), temperature source Oral, resp. rate 16, height 160 cm (63\"), weight 63.5 kg (140 lb), SpO2 95 %.  GENERAL: Less frail-appearing.  Awake and alert.  No acute distress.  HEENT:  Normocephalic, atraumatic. No external oral lesions noted.  EYES: No conjunctival injection.  Anicteric.  LUNGS: nonlabored breathing on room air.  Not coughing.  ABDOMEN: nondistended  GENITAL: no Barnes catheter  SKIN: no new rashes noted  PSYCHIATRIC: cooperative.  Appropriate mood and affect  EXT:  No cellulitic change.  NEURO: awake alert and oriented ×4.  Normal speech and cognition      Results Review:   I reviewed the patient's new clinical results.    Results from last 7 days   Lab Units 06/14/23  0540 06/13/23  0930 06/12/23  0440   WBC 10*3/mm3 2.96* 2.20* 1.97*   HEMOGLOBIN g/dL 9.2* 9.1* 9.4*   HEMATOCRIT % 29.9* 28.9* 29.7*   PLATELETS 10*3/mm3 127* 110* 160     Results from last 7 days   Lab Units 06/12/23  1406 06/12/23  0440   SODIUM mmol/L  --  137   POTASSIUM mmol/L 3.7 3.2*   CHLORIDE mmol/L  --  106   CO2 mmol/L  --  22.0   BUN mg/dL  --  6   CREATININE mg/dL  --  0.58   GLUCOSE mg/dL  --  106*   CALCIUM mg/dL  --  7.7*     Results from last 7 days   Lab Units 06/12/23  0440 06/11/23  0643   ALK PHOS U/L 92 106   BILIRUBIN mg/dL 0.3 0.3   BILIRUBIN DIRECT mg/dL  --  <0.2   ALT (SGPT) U/L 24 17   AST (SGOT) U/L 49* 31                 Results from last 7 days   Lab Units 06/10/23  1022   LACTATE mmol/L 1.9     Estimated Creatinine Clearance: 99.4 mL/min (by C-G formula based on SCr of 0.58 mg/dL).  CPK        5/24/2023    07:51   Common Labsle   Creatine Kinase 27       Procalitonin Results:      Lab 06/10/23  1022   PROCALCITONIN 0.14      Brief Urine Lab Results  (Last result in the past 365 days)      Color   Clarity   Blood   Leuk Est   Nitrite   Protein   CREAT  " " Urine HCG        06/10/23 1023 Yellow   Clear   Negative   Negative   Negative   Negative                No results found for: SITE, ALLENTEST, PHART, FUP9CLK, PO2ART, VRD2XZN, BASEEXCESS, M1OJQCSH, HGBBG, HCTABG, OXYHEMOGLOBI, METHHGBN, CARBOXYHGB, CO2CT, BAROMETRIC, MODALITY, FIO2     Microbiology:  Blood Culture   Date Value Ref Range Status   06/10/2023 No growth at 24 hours  Preliminary       Urine Culture   Date Value Ref Range Status   06/10/2023 No growth  Final     No results found for: VIRALCULTU, WOUNDCX     Blood Culture   Date Value Ref Range Status   06/10/2023 No growth at 24 hours  Preliminary     Urine Culture   Date Value Ref Range Status   06/10/2023 No growth  Final   (      Radiology:  Imaging Results (Last 72 Hours)     ** No results found for the last 72 hours. **          IMPRESSION:     Problems:  1. Sepsis with ongoing fevers, intermittent tachycardia, worsening leukopenia, and bandemia. Source remains unclear.  Did have a recent urinary tract infection on 5/24 which should be covered by ceftriaxone.  Macrobid does not get into the upper urinary tract but may have sterilized her lower urinary tract.  Repeat urine culture on admission was no growth.  Blood cultures from 6/10 are no growth to date.  CT abdomen and pelvis without contrast from 6/10 showed mild right perinephric stranding but decreased from prior CT scan on 5/22. No other acute intra-abdominal process noted. Respiratory PCR panel was negative. She does report recent exposure to ticks. No raw or undercooked food although she did eat out at restaurants while in [place] prior to her symptoms starting. Did have some initial diarrhea, nausea, and vomiting in May but these symptoms have since resolved. Could be ehrlichiosis. RMSF seems less likely. Other viral process is a possibility although no known sick contacts. She did have recent \"fever blister\" and she is having headache so HSV is possible but mouth sore has resolved and she " has no nuchal rigidity or neck pain. WNV or other arboviral infection is possible but no reported exposure to mosquitos recently. Disseminated fungal infection is possible and could infiltrate bone marrow but seems less likely. A non-infectious process such as rheumatologic process or hematologic malignancy remains possible. Delayed drug reaction possible but no rash. Cytopenias are now improving.  She did have another fever over 10 2°F overnight but fevers are better this morning and she is overall feeling better.  Plan for discharge with close follow-up.  2. Leukopenia/neutropenia-improving  3. Thrombocytopenia- improving  4. Normocytic anemia-stable  5. Recent E. Coli urinary tract infection  6. hypothyroidism    RECOMMENDATIONS:    -continue to follow pending blood cultures from 6/10-no growth to date  -sent WNV IgM, Ehrlichia PCR, and RMSF serologies-pending  -sent Monospot-negative  -CMV PCR from the blood-pending  -continue doxycycline 100 mg PO BID      I am okay with her discharge home today with the below plan    UM/PATTI:  1.  Doxycycline 100 mg by mouth twice a day. Anticipate continue this antibiotic for a total of 2 weeks until 6/26/23.  2.  Patient will follow up in my clinic on 6/22/23 with a plan for repeat CBC with differential.      I discussed with Dr. Arredondo again today    Thank you for asking me to see Gypsy Mann.  Our group would be pleased to follow this patient over the course of their hospitalization and assist with outpatient antimicrobial therapy, as indicated.  Further recommendations depend on the results of the cultures and clinical course.    Complex MDM    Bubba Mejia MD  6/14/2023

## 2023-06-16 LAB
A PHAGOCYTOPH DNA BLD QL NAA+PROBE: NEGATIVE
CMV DNA SERPL NAA+PROBE-ACNC: NEGATIVE IU/ML
CMV DNA SERPL NAA+PROBE-LOG IU: NORMAL LOG10 IU/ML
E CHAFFEENSIS DNA BLD QL NAA+PROBE: POSITIVE
R RICKETTSI IGG SER QL IA: NEGATIVE
R RICKETTSI IGM SER-ACNC: 0.24 INDEX (ref 0–0.89)

## 2023-06-19 ENCOUNTER — READMISSION MANAGEMENT (OUTPATIENT)
Dept: CALL CENTER | Facility: HOSPITAL | Age: 53
End: 2023-06-19
Payer: COMMERCIAL

## 2023-06-19 NOTE — OUTREACH NOTE
Medical Week 1 Survey      Flowsheet Row Responses   [facility] patient discharged from? [facility]   Does the patient have one of the following disease processes/diagnoses(primary or secondary)? Other   Week 1 attempt successful? Yes   Call start time 1250   Call end time 1252   Discharge diagnosis Acute febrile illness   Ureteral stone   Person spoke with today (if not patient) and relationship patient   Does the patient have all medications ordered at discharge? Yes   Comments regarding appointments Has a followup with Urology on 7/13   Psychosocial issues? No   Did the patient receive a copy of their discharge instructions? Yes   Nursing interventions Reviewed instructions with patient   What is the patient's perception of their health status since discharge? Improving   Is the patient/caregiver able to teach back signs and symptoms related to disease process for when to call PCP? Yes   Is the patient/caregiver able to teach back signs and symptoms related to disease process for when to call 911? Yes   Is the patient/caregiver able to teach back the hierarchy of who to call/visit for symptoms/problems? PCP, Specialist, Home health nurse, Urgent Care, ED, 911 Yes   If the patient is a current smoker, are they able to teach back resources for cessation? Not a smoker   Week 1 call completed? Yes   Graduated Yes   Is the patient interested in additional calls from an ambulatory ?  NOTE:  applies to high risk patients requiring additional follow-up. No   Graduated/Revoked comments Quick call. Patient has returned to work at [employer]. She is doing well. No issues or concerns.            Wes MORRISSEY - Registered Nurse

## 2023-06-28 ENCOUNTER — TELEPHONE (OUTPATIENT)
Dept: UROLOGY | Facility: CLINIC | Age: 53
End: 2023-06-28

## 2023-06-28 NOTE — TELEPHONE ENCOUNTER
Hub staff attempted to follow warm transfer process and was unsuccessful     Caller: SAMEERA MANE    Relationship to patient: SELF    Best call back number: [phone]    Patient is needing: CONTINUOUS LEAVE NEEDS TO BE CHANGED TO INTERMITTENT LEAVE START DATE 5/22/23 MAYBE END DATE 13 JUL.  FMLA PAPERWORK          Health  met with patient to complete initial outcomes for the Healthy Back lumbar program.  Questions were reviewed with patient and discussed with Dr. Pineda. The patient will meet with Dr. Pineda to determine program enrollment.     HealthyBack Questionnaire  11/19/2020   Please select the location of your worst pain:  Low back   Please select the location of any additional pain: (hold down the control key, and click to select multiple responses) None of the above    Did the pain begin after an event, injury, or accident? No   How long has this pain been going on?  20 years, back has gone out at least 4 times since but happening more often.  Latest flare was 2 weeks ago after a massage, lasted 3 days.   Please indicate how the pain is changing.  No Change   What is the WORST level of pain that you have experienced in the last week?  6   What is the LEAST level of pain that you have experienced in the past week?  0   What can you NOT do not that you used to be able to do?  N/A   Are your limitations mainly due to your pain?  No   What are your additional complaints, if any? Burning, Numbness, Tingling, Weakness   Is there ever a time during the day when your pain stops, even for a brief moment, before returning? Yes   If yes, when your pain stops, does it disappear completely? Is it totally gone? Yes   Does bending forward make your typical pain worse? Yes   Morning: Worse during   Afternoon: Better during   Evening:  Better during   Nighttime: Better during   Standing:  Worse   Lying on stomach: Same   Lying on back: Same   Sitting:  Worse   Walking: Better   Climbing stairs: Better   Emergency department  No   Health care providers (hold down the control key, and click to select multiple responses) Family doctor, Chiropractor   Investigations done (hold down the control key, and click to select multiple responses) X-ray   Procedures (hold down the control key, and click to select multiple responses) None    Have you had any surgery on your back?  No   Please kelli what you are experiencing. (hold down the control key, and click to select multiple responses) Arthritic joint pain   First activity you would like to perform better:  Lifting   Second activity you would like to perform better: Bending   Third activity you would like to perform better: Standing   What is your occupation?     What is your highest level of education? Advanced/graduate   What is your work status? Employed   How did you hear about the Healthyback program?  E-mail blast

## 2023-12-13 ENCOUNTER — TRANSCRIBE ORDERS (OUTPATIENT)
Dept: ADMINISTRATIVE | Facility: HOSPITAL | Age: 53
End: 2023-12-13
Payer: COMMERCIAL

## 2023-12-13 DIAGNOSIS — Z12.31 SCREENING MAMMOGRAM FOR BREAST CANCER: Primary | ICD-10-CM

## 2024-02-14 ENCOUNTER — HOSPITAL ENCOUNTER (OUTPATIENT)
Dept: MAMMOGRAPHY | Facility: HOSPITAL | Age: 54
Discharge: HOME | End: 2024-02-14
Admitting: OBSTETRICS & GYNECOLOGY
Payer: COMMERCIAL

## 2024-02-14 DIAGNOSIS — Z12.31 SCREENING MAMMOGRAM FOR BREAST CANCER: ICD-10-CM

## 2024-02-14 PROCEDURE — 77067 SCR MAMMO BI INCL CAD: CPT

## 2024-02-14 PROCEDURE — 77063 BREAST TOMOSYNTHESIS BI: CPT

## 2024-10-21 ENCOUNTER — TRANSCRIBE ORDERS (OUTPATIENT)
Dept: ADMINISTRATIVE | Facility: HOSPITAL | Age: 54
End: 2024-10-21
Payer: COMMERCIAL

## 2024-10-21 DIAGNOSIS — Z12.31 VISIT FOR SCREENING MAMMOGRAM: Primary | ICD-10-CM

## 2025-01-17 DIAGNOSIS — K12.30 MUCOSITIS ORAL: Primary | ICD-10-CM

## 2025-01-17 RX ORDER — ACYCLOVIR 800 MG/1
800 TABLET ORAL 3 TIMES DAILY
Qty: 15 TABLET | Refills: 0 | Status: SHIPPED | OUTPATIENT
Start: 2025-01-17

## 2025-02-03 LAB
NCCN CRITERIA FLAG: NORMAL
TYRER CUZICK SCORE: 5.9

## 2025-02-17 ENCOUNTER — HOSPITAL ENCOUNTER (OUTPATIENT)
Dept: MAMMOGRAPHY | Facility: HOSPITAL | Age: 55
Discharge: HOME | End: 2025-02-17
Admitting: OBSTETRICS & GYNECOLOGY
Payer: COMMERCIAL

## 2025-02-17 DIAGNOSIS — Z12.31 VISIT FOR SCREENING MAMMOGRAM: ICD-10-CM

## 2025-02-17 PROCEDURE — 77063 BREAST TOMOSYNTHESIS BI: CPT

## 2025-02-17 PROCEDURE — 77067 SCR MAMMO BI INCL CAD: CPT

## 2025-02-19 PROCEDURE — 77067 SCR MAMMO BI INCL CAD: CPT | Performed by: RADIOLOGY

## 2025-02-19 PROCEDURE — 77063 BREAST TOMOSYNTHESIS BI: CPT | Performed by: RADIOLOGY

## (undated) DEVICE — PROXIMATE RH ROTATING HEAD SKIN STAPLERS (35 WIDE) CONTAINS 35 STAINLESS STEEL STAPLES: Brand: PROXIMATE

## (undated) DEVICE — CATH URETRL FLXITP POLLACK STD 5F 70CM

## (undated) DEVICE — GLV SURG BIOGEL LTX PF 8

## (undated) DEVICE — PK CYSTO-TUR BASIC 10

## (undated) DEVICE — DRSNG WND BORDR/ADHS NONADHR/GZ LF 4X4IN STRL

## (undated) DEVICE — ANTIBACTERIAL UNDYED BRAIDED (POLYGLACTIN 910), SYNTHETIC ABSORBABLE SUTURE: Brand: COATED VICRYL

## (undated) DEVICE — ADHS LIQ MASTISOL 2/3ML

## (undated) DEVICE — ADHS SKIN PREMIERPRO EXOFIN TOPICAL HI/VISC .5ML

## (undated) DEVICE — PK CHST BRST 10

## (undated) DEVICE — ELECTRD BLD EZ CLN MOD 4IN

## (undated) DEVICE — STRAP POSTN KN/BDY FM 5X72IN DISP

## (undated) DEVICE — BANDAGE,GAUZE,BULKEE II,4.5"X4.1YD,STRL: Brand: MEDLINE

## (undated) DEVICE — PATIENT RETURN ELECTRODE, SINGLE-USE, CONTACT QUALITY MONITORING, ADULT, WITH 9FT CORD, FOR PATIENTS WEIGING OVER 33LBS. (15KG): Brand: MEGADYNE

## (undated) DEVICE — GW ZIPWIRE STD/SHFT STR JB/8CM .035X150CM

## (undated) DEVICE — ELECTRD BLD EZ CLN STD 2.5IN

## (undated) DEVICE — CABL BIPOL MEGADYNE 12FT DISP

## (undated) DEVICE — UNDERGLV SURG BIOGEL INDICATOR LF PF 7.5

## (undated) DEVICE — SUT MNCRYL PLS ANTIB UD 4/0 PS2 18IN

## (undated) DEVICE — BRA COMPR SURG STL958 LG

## (undated) DEVICE — GOWN,NON-REINFORCED,SIRUS,SET IN SLV,XXL: Brand: MEDLINE

## (undated) DEVICE — GLV SURG PREMIERPRO MIC LTX PF SZ7.5 BRN

## (undated) DEVICE — NITINOL WIRE WITH HYDROPHILIC TIP: Brand: SENSOR

## (undated) DEVICE — SUT MNCRYL PLS ANTIB UD 4/0 PC3 18IN

## (undated) DEVICE — C-ARM DRAPE: Brand: DEROYAL

## (undated) DEVICE — SYR LUERLOK 50ML

## (undated) DEVICE — GLV SURG SENSICARE MICRO PF LF 7 STRL

## (undated) DEVICE — FIBR LASR SOLTIVE BALL/TP 200MICRON 1P/U

## (undated) DEVICE — SKIN AFFIX SURG ADHESIVE 72/CS 0.55ML: Brand: MEDLINE

## (undated) DEVICE — PK NEURO DISC 10

## (undated) DEVICE — TOOL MR8-14MH30D MR8 14CM MATCH DMD 3MM: Brand: MIDAS REX MR8

## (undated) DEVICE — COVER,LIGHT HANDLE,FLX,1/PK: Brand: MEDLINE INDUSTRIES, INC.

## (undated) DEVICE — INTENDED FOR TISSUE SEPARATION, AND OTHER PROCEDURES THAT REQUIRE A SHARP SURGICAL BLADE TO PUNCTURE OR CUT.: Brand: BARD-PARKER ® SAFETYLOCK CARBON RIB-BACK BLADES

## (undated) DEVICE — SYS SKIN CLS DERMABOND PRINEO W/22CM MESH TP

## (undated) DEVICE — INTENDED USE FOR SURGICAL MARKING ON INTACT SKIN, ALSO PROVIDES A PERMANENT METHOD OF IDENTIFYING OBJECTS IN THE OPERATING ROOM: Brand: WRITESITE® REGULAR TIP SKIN MARKER

## (undated) DEVICE — HDRST INTUB GENTLETOUCH SLOT 7IN RT

## (undated) DEVICE — CLTH CLENS READYCLEANSE PERI CARE PK/5

## (undated) DEVICE — GLV SURG PREMIERPRO MIC LTX PF SZ6.5 BRN

## (undated) DEVICE — SUT MNCRYL PLS ANTIB UD 3/0 PS2 27IN

## (undated) DEVICE — SUT VIC PLS CTD ANTIB BR 3/0 8/18IN 45CM

## (undated) DEVICE — BLANKT WARM UPPR/BDY ARM/OUT 57X196CM

## (undated) DEVICE — PENCL ROCKRSWCH MEGADYNE W/HOLSTR 10FT SS

## (undated) DEVICE — SUT SILK 2/0 PS 18IN 1588H

## (undated) DEVICE — SUT ETHLN 3/0 PC5 18IN 1893G

## (undated) DEVICE — DRSNG SURESITE WNDW 2.38X2.75

## (undated) DEVICE — 3M™ STERI-STRIP™ REINFORCED ADHESIVE SKIN CLOSURES, R1547, 1/2 IN X 4 IN (12 MM X 100 MM), 6 STRIPS/ENVELOPE: Brand: 3M™ STERI-STRIP™